# Patient Record
Sex: FEMALE | Race: WHITE | Employment: FULL TIME | URBAN - METROPOLITAN AREA
[De-identification: names, ages, dates, MRNs, and addresses within clinical notes are randomized per-mention and may not be internally consistent; named-entity substitution may affect disease eponyms.]

---

## 2018-06-05 ENCOUNTER — MEDICAL CORRESPONDENCE (OUTPATIENT)
Dept: HEALTH INFORMATION MANAGEMENT | Facility: CLINIC | Age: 23
End: 2018-06-05

## 2018-07-12 ENCOUNTER — OFFICE VISIT (OUTPATIENT)
Dept: PSYCHIATRY | Facility: CLINIC | Age: 23
End: 2018-07-12
Attending: CLINICAL NURSE SPECIALIST
Payer: COMMERCIAL

## 2018-07-12 VITALS — SYSTOLIC BLOOD PRESSURE: 119 MMHG | DIASTOLIC BLOOD PRESSURE: 78 MMHG | WEIGHT: 142 LBS | HEART RATE: 97 BPM

## 2018-07-12 DIAGNOSIS — F41.9 ANXIETY: ICD-10-CM

## 2018-07-12 DIAGNOSIS — F33.1 MAJOR DEPRESSIVE DISORDER, RECURRENT EPISODE, MODERATE (H): ICD-10-CM

## 2018-07-12 DIAGNOSIS — F43.10 PTSD (POST-TRAUMATIC STRESS DISORDER): Primary | ICD-10-CM

## 2018-07-12 DIAGNOSIS — F48.1 DEPERSONALIZATION-DEREALIZATION DISORDER (H): ICD-10-CM

## 2018-07-12 PROBLEM — F64.0 GENDER DYSPHORIA IN ADULT: Status: ACTIVE | Noted: 2018-07-12

## 2018-07-12 PROCEDURE — G0463 HOSPITAL OUTPT CLINIC VISIT: HCPCS | Mod: ZF

## 2018-07-12 RX ORDER — FLUOXETINE 10 MG/1
10 CAPSULE ORAL DAILY
Qty: 30 CAPSULE | Refills: 1 | Status: SHIPPED | OUTPATIENT
Start: 2018-07-12 | End: 2018-11-14

## 2018-07-12 RX ORDER — CLINDAMYCIN PHOSPHATE 10 MG/G
GEL TOPICAL DAILY
COMMUNITY
End: 2020-11-27

## 2018-07-12 RX ORDER — TESTOSTERONE CYPIONATE 200 MG/ML
50 INJECTION, SOLUTION INTRAMUSCULAR WEEKLY
COMMUNITY
End: 2021-04-05

## 2018-07-12 ASSESSMENT — PAIN SCALES - GENERAL: PAINLEVEL: NO PAIN (0)
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
## 2018-07-12 NOTE — MR AVS SNAPSHOT
After Visit Summary   7/12/2018    Syed Hutchinson    MRN: [id number]           Patient Information     Date Of Birth          1995        Visit Information        Provider Department      7/12/2018 10:45 AM Ellie Wise APRN CNS [facility]        Today's Diagnoses     PTSD (post-traumatic stress disorder)    -  1    Depersonalization-derealization disorder        Major depressive disorder, recurrent episode, moderate (H)        Anxiety           Follow-ups after your visit        Follow-up notes from your care team     Return in about 4 weeks (around 8/9/2018).      Your next 10 appointments already scheduled     Aug 10, 2018  4:15 PM CDT   Adult Med Follow UP with ELBA Issa CNS   Psychiatry Clinic ([facility])    [place] [id number]  [address] [place] [id number] [phone]              Who to contact     Please call your clinic at [phone] to:    Ask questions about your health    Make or cancel appointments    Discuss your medicines    Learn about your test results    Speak to your doctor            Additional Information About Your Visit        MyChart Information     [employer] gives you secure access to your electronic health record. If you see a primary care provider, you can also send messages to your care team and make appointments. If you have questions, please call your primary care clinic.  If you do not have a primary care provider, please call [phone] and they will assist you.      [employer] is an electronic gateway that provides easy, online access to your medical records. With [employer], you can request a clinic appointment, read your test results, renew a prescription or communicate with your care team.     To access your existing account, please contact your [facility] or call [phone] for assistance.        Care EveryWhere ID     This is your Care EveryWhere  ID. This could be used by other organizations to access your [place] medical records  [id number]        Your Vitals Were     Pulse                   97            Blood Pressure from Last 3 Encounters:   07/12/18 119/78    Weight from Last 3 Encounters:   07/12/18 64.4 kg (142 lb)              Today, you had the following     No orders found for display         Today's Medication Changes          These changes are accurate as of 7/12/18 11:59 PM.  If you have any questions, ask your nurse or doctor.               Start taking these medicines.        Dose/Directions    FLUoxetine 10 MG capsule   Commonly known as:  PROzac   Used for:  Major depressive disorder, recurrent episode, moderate (H)   Started by:  Ellie Wise APRN CNS        Dose:  10 mg   Take 1 capsule (10 mg) by mouth daily   Quantity:  30 capsule   Refills:  1            Where to get your medicines      These medications were sent to [id number] - [place], [place] - [address] AT [facility] [address]  [facility] [address], [place] [address]    Hours:  24-hours Phone:  [phone]     FLUoxetine 10 MG capsule                Primary Care Provider Fax #    Physician No Ref-Primary [phone]       No address on file        Equal Access to Services     MALI BULLARD AH: Jose Rafael fitzgerald Sosaeid, waaxda luqadaha, qaybta kaalmada adeegyada, arlene skinner. So [facility] [phone].    ATENCIÓN: Si habla español, tiene a olivo disposición servicios gratuitos de asistencia lingüística. William al [phone].    We comply with applicable federal civil rights laws and [place] laws. We do not discriminate on the basis of race, color, national origin, age, disability, sex, sexual orientation, or gender identity.            Thank you!     Thank you for choosing [facility]  for your care. Our goal is always to provide you with excellent care. Hearing back from our patients is one way we can  continue to improve our services. Please take a few minutes to complete the written survey that you may receive in the mail after your visit with us. Thank you!             Your Updated Medication List - Protect others around you: Learn how to safely use, store and throw away your medicines at www.disposemymeds.org.          This list is accurate as of 7/12/18 11:59 PM.  Always use your most recent med list.                   Brand Name Dispense Instructions for use Diagnosis    clindamycin 1 % topical gel    CLINDAMAX     Apply topically daily        FINASTERIDE PO      Take 5 mg by mouth daily        FLUoxetine 10 MG capsule    PROzac    30 capsule    Take 1 capsule (10 mg) by mouth daily    Major depressive disorder, recurrent episode, moderate (H)       testosterone cypionate 200 MG/ML injection    DEPOTESTOTERONE     Inject 50 mg into the muscle once a week

## 2018-07-25 ENCOUNTER — HEALTH MAINTENANCE LETTER (OUTPATIENT)
Age: 23
End: 2018-07-25

## 2018-08-01 ENCOUNTER — MYC MEDICAL ADVICE (OUTPATIENT)
Dept: PSYCHIATRY | Facility: CLINIC | Age: 23
End: 2018-08-01

## 2018-08-01 NOTE — TELEPHONE ENCOUNTER
Phone call to David. Reviewed side effects and consideration of discontinuing and trying SSNRI instead either Venlafaxine or Duloxetine. They will continue fluoxetine 10mg dose at this time and if symptoms worsen stop it and send new message to let me know. Manda CULLEN.  Ellie Wise CNS, APRN

## 2018-08-06 ASSESSMENT — PATIENT HEALTH QUESTIONNAIRE - PHQ9: SUM OF ALL RESPONSES TO PHQ QUESTIONS 1-9: 27

## 2018-08-10 ENCOUNTER — OFFICE VISIT (OUTPATIENT)
Dept: PSYCHIATRY | Facility: CLINIC | Age: 23
End: 2018-08-10
Attending: CLINICAL NURSE SPECIALIST
Payer: COMMERCIAL

## 2018-08-10 VITALS — WEIGHT: 137 LBS | SYSTOLIC BLOOD PRESSURE: 124 MMHG | DIASTOLIC BLOOD PRESSURE: 75 MMHG | HEART RATE: 109 BPM

## 2018-08-10 DIAGNOSIS — F33.1 MAJOR DEPRESSIVE DISORDER, RECURRENT EPISODE, MODERATE (H): ICD-10-CM

## 2018-08-10 DIAGNOSIS — F43.10 PTSD (POST-TRAUMATIC STRESS DISORDER): Primary | ICD-10-CM

## 2018-08-10 DIAGNOSIS — F48.1 DEPERSONALIZATION-DEREALIZATION DISORDER (H): ICD-10-CM

## 2018-08-10 PROCEDURE — G0463 HOSPITAL OUTPT CLINIC VISIT: HCPCS | Mod: ZF

## 2018-08-10 RX ORDER — BUPROPION HYDROCHLORIDE 100 MG/1
TABLET, EXTENDED RELEASE ORAL
Qty: 60 TABLET | Refills: 2 | Status: SHIPPED | OUTPATIENT
Start: 2018-08-10 | End: 2018-10-05

## 2018-08-10 ASSESSMENT — PAIN SCALES - GENERAL: PAINLEVEL: NO PAIN (0)

## 2018-08-10 NOTE — PROGRESS NOTES
"  Outpatient Psychiatry Progress Note     Provider: ELBA Issa CNS  Date: 8/10/2018  Service:  Medication follow up with counseling.   Patient Identification: Syed Hutchinson  : 1995   MRN: [id number]    Syed Hutchinson is a 23 year old year old female who presents for ongoing psychiatric care.  Syed Hutchinson was last seen in clinic on 18.   At that time,   Assessment & Plan      Assessment:   David Hutchinson is a 23 year old transgender male  who presents for treatment of Complex PTSD which exists with symptoms of depersonalization/derealization, anxiety with obsessive compulsive component and depression.  Their mental health treatment history dates back to childhood but only trials of 2 medications. Although has thoughts of being better off dead, denies SI plan or intent.  David is currently in therapy at [facility] and has no history of psychiatric hospitalizations. An outpatient treatment program may be something to consider     Diagnosis  PTSD  Depersonalization/Derealization  Anxiety  Major Depression, Recurrent Moderate     Plan:    Medication: Start Prozac 10mg daily  OTC Recommendations: none  Lab Orders:  none  Referrals: none  Release of Information: release for therapist  Gordon Geiger at [facility] phone [phone] fax [phone] was faxed to our clinic and will be scanned to chart.  Future Treatment Considerations: per response to Prozac  Return for Follow Up: 4 weeks      ____________________________________________________________________________________________________________________________________________  Kenneth Argueta--wanted to check in as I've been experiencing some disruptive side effects and wanted to know how \"normal\" this is and likely to subside. My experience going on zoloft & lexapro wasn't like this at all.     Loss of appetite, nausea, unable to eat normal portions (this is getting really disruptive, I'm forcing myself to eat more than 1000 mahin a " day--this is an ED trigger)   Headaches   Difficulty falling asleep and staying asleep/unable to nap during the day despite exhaustion (in part caused by racing thoughts/nervous energy that results in leg bouncing/hand tapping)   Nightmares     Being unable to get two basic needs (sleep/food) met is impacting my mood and making me feel out of control. My dpdr has gotten worse in the last few weeks to the point that I am taking a day off work because I'm not able to get things done in the office.     If you think this will taper off as I adjust to the med then I'm willing to keep trying for a few more weeks, but if 10mg can mess with me this much, prozac may not be the ssri for me. I know we meet next week, but didn't feel like I could wait any longer.    08/10/2018  Today Syed reports side effects from Prozac worsened and so stopped it on Wednesday.   Sister and father are treated for depression and both have done well with Wellbutrin. Both also have anxiety.  Side effects of medication include: see subjective.  Psychiatric Review of Systems:  Depression: In the last 2 weeks per PHQ 9 score of 27. Denies Suicide plan or intent. Stress and intrusive thoughts affect mood.  Anxiety : no change  Humaira na   Psychosis  na.   ADHD na    Review of Medical Systems:  Sleep: decreased  Energy: low  Concentration: difficult  Appetite: decreased  GI Concerns: none  Cardiac concerns: none  Neurological concerns: none  Other medical concerns: no new concerns  Current Substance Use:  Alcohol:denies  Other drugs:denies  Caffeine:little less than last appointment  Nicotine: denies  Past Medical History: No past medical history on file.  Patient Active Problem List   Diagnosis     PTSD (post-traumatic stress disorder)     Major depressive disorder, recurrent episode, moderate (H)       Allergies:   Allergies   Allergen Reactions     Amoxicillin      hives          Current Medications     Current Outpatient Prescriptions Ordered in  "Epic   Medication Sig Dispense Refill     clindamycin (CLINDAMAX) 1 % topical gel Apply topically daily       FINASTERIDE PO Take 5 mg by mouth daily       testosterone cypionate (DEPOTESTOTERONE) 200 MG/ML injection Inject 50 mg into the muscle once a week       FLUoxetine (PROZAC) 10 MG capsule Take 1 capsule (10 mg) by mouth daily (Patient not taking: Reported on 8/10/2018) 30 capsule 1     No current Epic-ordered facility-administered medications on file.         Mental Status Exam     Appearance:  Casually dressed and Well groomed  Behavior/relationship to examiner/demeanor:  Cooperative  Orientation: Oriented to person, place, time and situation  Psychomotor: normal form  Speech Rate:  Normal  Speech Spontaneity:  Normal  Mood:  \"the same\"  Affect:  Appropriate/mood-congruent  Thought Process (Associations):  Goal directed  Thought Content:  no overt psychosis, patient does not appear to be responding to internal stimuli, Suicidal ideation and denies suicidal intent or plan  Abnormal Perception:  Depersonalization  Attention/Concentration:  Normal  Insight:  Good  Judgment:  Good      Results     Vital signs: /75  Pulse 109  Wt 62.1 kg (137 lb)    Laboratory Data:  denies    Assessment & Plan      Syed Hutchinson is seen today for follow up and reports they did not to tolerate Fluoxetine 10mg secondary to insomnia, decreased appetite, increase in PTSD. Discussed considering SSNRI but they would like to try Wellbutrin since father and sister do well with this medication.   Previous medication trials include Lexapro and Sertraline which caused emotional numbing    Diagnosis  PTSD  Depersonalization/Derealization  Anxiety  Major Depression, Recurrent Moderate    Plan:  Medication: Start Wellbutrin SR 100mg for 2 weeks if no side effects then increase to 200mg daily in the am.  OTC Recommendations: none  Lab Orders:  none  Referrals: none, consider outpatient treatment program.  Release of " Information: none  Future Treatment Considerations:per response to Wellbutrin  Return for Follow Up:10 weeks   The risks, benefits, alternatives and side effects have been discussed and are understood by the patient. The patient understands the risks of using street drugs or alcohol. There are no medical contraindications, the patient agrees to treatment, and has the capacity to do so. The patient understands to call 911 or come to the nearest ED if life threatening or urgent symptoms present.  In addition time was spent counseling the patient and/or coordinating care regarding review of social and occupational functioning.  In addition patient was counseled on health and wellness practices to augment medication treatment of symptoms. See note for details.    Ellie Wise, APRN CNS 8/10/2018

## 2018-08-10 NOTE — MR AVS SNAPSHOT
After Visit Summary   8/10/2018    Syed Hutchinson    MRN: [id number]           Patient Information     Date Of Birth          1995        Visit Information        Provider Department      8/10/2018 4:15 PM Ellie Wise APRN CNS Psychiatry Clinic        Today's Diagnoses     PTSD (post-traumatic stress disorder)    -  1    Major depressive disorder, recurrent episode, moderate (H)        Depersonalization-derealization disorder           Follow-ups after your visit        Your next 10 appointments already scheduled     Oct 19, 2018  4:45 PM CDT   Adult Med Follow UP with ELBA Issa CNS   Psychiatry Clinic ([facility])    [address]  [address] [address] [phone]              Who to contact     Please call your clinic at [phone] to:    Ask questions about your health    Make or cancel appointments    Discuss your medicines    Learn about your test results    Speak to your doctor            Additional Information About Your Visit        [employer] Information     [employer] gives you secure access to your electronic health record. If you see a primary care provider, you can also send messages to your care team and make appointments. If you have questions, please call your primary care clinic.  If you do not have a primary care provider, please call [phone] and they will assist you.      [employer] is an electronic gateway that provides easy, online access to your medical records. With [employer], you can request a clinic appointment, read your test results, renew a prescription or communicate with your care team.     To access your existing account, please contact your [facility] or call [phone] for assistance.        Care EveryWhere ID     This is your Care EveryWhere ID. This could be used by other organizations to access your [facility] medical records  [id number]         Your Vitals Were     Pulse                   109            Blood Pressure from Last 3 Encounters:   08/10/18 124/75   07/12/18 119/78    Weight from Last 3 Encounters:   08/10/18 62.1 kg (137 lb)   07/12/18 64.4 kg (142 lb)              Today, you had the following     No orders found for display         Today's Medication Changes          These changes are accurate as of 8/10/18 11:59 PM.  If you have any questions, ask your nurse or doctor.               Start taking these medicines.        Dose/Directions    buPROPion 100 MG 12 hr tablet   Commonly known as:  WELLBUTRIN SR   Used for:  Major depressive disorder, recurrent episode, moderate (H)   Started by:  Ellie Wise APRN CNS        Take one tablet by mouth daily in the am for two weeks then increase to two tablets daily in the am   Quantity:  60 tablet   Refills:  2            Where to get your medicines      These medications were sent to [id number] - [place], [place] - [address] AT [facility]  [facility] [address], [place] [address]    Hours:  24-hours Phone:  [phone]     buPROPion 100 MG 12 hr tablet                Primary Care Provider Fax #    Physician No Ref-Primary [phone]       No address on file        Equal Access to Services     MALI BULLARD : Hadii ilya scott hadasho Sonelali, waaxda luqadaha, qaybta kaalmada adeegyada, waxay jayde skinner. So [facility] [phone].    ATENCIÓN: Si habla español, tiene a olivo disposición servicios gratuitos de asistencia lingüística. Llame al [phone].    We comply with applicable federal civil rights laws and [place] laws. We do not discriminate on the basis of race, color, national origin, age, disability, sex, sexual orientation, or gender identity.            Thank you!     Thank you for choosing PSYCHIATRY CLINIC  for your care. Our goal is always to provide you with excellent care. Hearing back from our patients is one way  we can continue to improve our services. Please take a few minutes to complete the written survey that you may receive in the mail after your visit with us. Thank you!             Your Updated Medication List - Protect others around you: Learn how to safely use, store and throw away your medicines at www.disposemymeds.org.          This list is accurate as of 8/10/18 11:59 PM.  Always use your most recent med list.                   Brand Name Dispense Instructions for use Diagnosis    buPROPion 100 MG 12 hr tablet    WELLBUTRIN SR    60 tablet    Take one tablet by mouth daily in the am for two weeks then increase to two tablets daily in the am    Major depressive disorder, recurrent episode, moderate (H)       clindamycin 1 % topical gel    CLINDAMAX     Apply topically daily        FINASTERIDE PO      Take 5 mg by mouth daily        FLUoxetine 10 MG capsule    PROzac    30 capsule    Take 1 capsule (10 mg) by mouth daily    Major depressive disorder, recurrent episode, moderate (H)       testosterone cypionate 200 MG/ML injection    DEPOTESTOTERONE     Inject 50 mg into the muscle once a week

## 2018-08-20 PROBLEM — F48.1 DEPERSONALIZATION-DEREALIZATION DISORDER (H): Status: ACTIVE | Noted: 2018-08-20

## 2018-08-22 ASSESSMENT — PATIENT HEALTH QUESTIONNAIRE - PHQ9: SUM OF ALL RESPONSES TO PHQ QUESTIONS 1-9: 27

## 2018-08-24 ENCOUNTER — TELEPHONE (OUTPATIENT)
Dept: PSYCHIATRY | Facility: CLINIC | Age: 23
End: 2018-08-24

## 2018-10-05 ENCOUNTER — OFFICE VISIT (OUTPATIENT)
Dept: PSYCHIATRY | Facility: CLINIC | Age: 23
End: 2018-10-05
Attending: PSYCHIATRY & NEUROLOGY
Payer: COMMERCIAL

## 2018-10-05 VITALS — DIASTOLIC BLOOD PRESSURE: 82 MMHG | SYSTOLIC BLOOD PRESSURE: 118 MMHG | WEIGHT: 144.8 LBS | HEART RATE: 81 BPM

## 2018-10-05 DIAGNOSIS — F48.1 DEPERSONALIZATION-DEREALIZATION DISORDER (H): Primary | ICD-10-CM

## 2018-10-05 PROCEDURE — G0463 HOSPITAL OUTPT CLINIC VISIT: HCPCS | Mod: ZF

## 2018-10-05 RX ORDER — VENLAFAXINE HYDROCHLORIDE 37.5 MG/1
37.5 CAPSULE, EXTENDED RELEASE ORAL DAILY
Qty: 30 CAPSULE | Refills: 0 | Status: SHIPPED | OUTPATIENT
Start: 2018-10-05 | End: 2018-10-25

## 2018-10-05 ASSESSMENT — PAIN SCALES - GENERAL: PAINLEVEL: NO PAIN (0)

## 2018-10-05 NOTE — PATIENT INSTRUCTIONS
Begin Effexor XR 37.5mg daily    In Crisis? :  [facility] [phone] (clinic)    [phone] (after hours)  CRISIS TEXT LINE: Text [id number] from anywhere in [place], anytime, any crisis 24/7;  OR SEE www.crisistextline.org

## 2018-10-05 NOTE — MR AVS SNAPSHOT
After Visit Summary   10/5/2018    Syed Hutchinson    MRN: [id number]           Patient Information     Date Of Birth          1995        Visit Information        Provider Department      10/5/2018 10:00 AM Pavan Baker MD Psychiatry Clinic        Today's Diagnoses     Depersonalization-derealization disorder (H)    -  1      Care Instructions    Begin Effexor XR 37.5mg daily    In Crisis? :  [facility] [phone] (clinic)    [phone] (after hours)  CRISIS TEXT LINE: Text 432831 from anywhere in [place], anytime, any crisis 24/7;  OR SEE www.crisistextline.org          Follow-ups after your visit        Follow-up notes from your care team     Return in about 4 weeks (around 11/2/2018) for 60 MFU.      Your next 10 appointments already scheduled     Oct 30, 2018 10:00 AM CDT   Adult Med Follow UP with Pavan Baker MD   Psychiatry Clinic ([facility])    [place] [id number] [address] [phone]              Who to contact     Please call your clinic at [phone] to:    Ask questions about your health    Make or cancel appointments    Discuss your medicines    Learn about your test results    Speak to your doctor            Additional Information About Your Visit        [employer] Information     [employer] gives you secure access to your electronic health record. If you see a primary care provider, you can also send messages to your care team and make appointments. If you have questions, please call your primary care clinic.  If you do not have a primary care provider, please call [phone] and they will assist you.      [employer] is an electronic gateway that provides easy, online access to your medical records. With [employer], you can request a clinic appointment, read your test results, renew a prescription or communicate with your care team.     To access your existing account, please contact your [facility]  [place] [facility] or call [phone] for assistance.        Care EveryWhere ID     This is your Care EveryWhere ID. This could be used by other organizations to access your [facility] medical records  [id number]        Your Vitals Were     Pulse                   81            Blood Pressure from Last 3 Encounters:   10/05/18 118/82   08/10/18 124/75   07/12/18 119/78    Weight from Last 3 Encounters:   10/05/18 65.7 kg (144 lb 12.8 oz)   08/10/18 62.1 kg (137 lb)   07/12/18 64.4 kg (142 lb)              Today, you had the following     No orders found for display         Today's Medication Changes          These changes are accurate as of 10/5/18 11:59 PM.  If you have any questions, ask your nurse or doctor.               Start taking these medicines.        Dose/Directions    venlafaxine 37.5 MG 24 hr capsule   Commonly known as:  EFFEXOR XR   Used for:  Depersonalization-derealization disorder (H)   Started by:  Pavan Baker MD        Dose:  37.5 mg   Take 1 capsule (37.5 mg) by mouth daily   Quantity:  30 capsule   Refills:  0            Where to get your medicines      These medications were sent to [facility] [address] [place] [address] [address] [place] [address] [email or web address] [facility] [address]     Phone:  [phone]     venlafaxine 37.5 MG 24 hr capsule                Primary Care Provider Fax #    Physician No Ref-Primary [phone]       No address on file        Equal Access to Services     MALI BULLARD : Hadii ilya ku hadasho Soomaali, waaxda luqadaha, qaybta kaalmada adeegyada, arlene skinner. So [facility] [phone].    ATENCIÓN: Si habla español, tiene a olivo disposición servicios gratuitos de asistencia lingüística. Llame al [phone].    We comply with applicable federal civil rights laws and [place] laws. We do not discriminate on the basis of race, color, national origin, age, disability, sex, sexual  orientation, or gender identity.            Thank you!     Thank you for choosing PSYCHIATRY CLINIC  for your care. Our goal is always to provide you with excellent care. Hearing back from our patients is one way we can continue to improve our services. Please take a few minutes to complete the written survey that you may receive in the mail after your visit with us. Thank you!             Your Updated Medication List - Protect others around you: Learn how to safely use, store and throw away your medicines at www.disposemymeds.org.          This list is accurate as of 10/5/18 11:59 PM.  Always use your most recent med list.                   Brand Name Dispense Instructions for use Diagnosis    clindamycin 1 % topical gel    CLINDAMAX     Apply topically daily        FINASTERIDE PO      Take 5 mg by mouth daily        FLUoxetine 10 MG capsule    PROzac    30 capsule    Take 1 capsule (10 mg) by mouth daily    Major depressive disorder, recurrent episode, moderate (H)       testosterone cypionate 200 MG/ML injection    DEPOTESTOSTERONE     Inject 50 mg into the muscle once a week        venlafaxine 37.5 MG 24 hr capsule    EFFEXOR XR    30 capsule    Take 1 capsule (37.5 mg) by mouth daily    Depersonalization-derealization disorder (H)
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
## 2018-10-06 ASSESSMENT — PATIENT HEALTH QUESTIONNAIRE - PHQ9: SUM OF ALL RESPONSES TO PHQ QUESTIONS 1-9: 24

## 2018-10-24 ENCOUNTER — MYC MEDICAL ADVICE (OUTPATIENT)
Dept: PSYCHIATRY | Facility: CLINIC | Age: 23
End: 2018-10-24

## 2018-10-24 DIAGNOSIS — F48.1 DEPERSONALIZATION-DEREALIZATION DISORDER (H): ICD-10-CM

## 2018-10-25 RX ORDER — VENLAFAXINE HYDROCHLORIDE 37.5 MG/1
37.5 CAPSULE, EXTENDED RELEASE ORAL DAILY
Qty: 14 CAPSULE | Refills: 0 | Status: SHIPPED | OUTPATIENT
Start: 2018-10-25 | End: 2018-11-13

## 2018-10-25 NOTE — TELEPHONE ENCOUNTER
Last seen: 10/5/18  RTC: 1 month  Cancel: 10/30/18- rescheduled for 11/13  No-show: none  Next appt: 11/13/18     Incoming refill request from patient.     Medication requested: venlafaxine (EFFEXOR XR) 37.5 MG 24 hr capsule  Directions: Take 1 capsule (37.5 mg) by mouth daily - Oral  Qty: 30 capsule  Last refilled: 10/5/2018     Medication refill approved per refill protocol- 14 day supply sent to pt's preferred pharmacy. Pt will have medications through rescheduled follow-up appt.    Per [employer] communication with pt, 10/30 appt cancelled and rescheduled for 11/13/18. Pt notified via [employer].

## 2018-11-13 ENCOUNTER — OFFICE VISIT (OUTPATIENT)
Dept: PSYCHIATRY | Facility: CLINIC | Age: 23
End: 2018-11-13
Attending: PSYCHIATRY & NEUROLOGY
Payer: COMMERCIAL

## 2018-11-13 VITALS — SYSTOLIC BLOOD PRESSURE: 135 MMHG | WEIGHT: 145.2 LBS | DIASTOLIC BLOOD PRESSURE: 73 MMHG | HEART RATE: 75 BPM

## 2018-11-13 DIAGNOSIS — F48.1 DEPERSONALIZATION-DEREALIZATION DISORDER (H): ICD-10-CM

## 2018-11-13 PROCEDURE — G0463 HOSPITAL OUTPT CLINIC VISIT: HCPCS | Mod: ZF

## 2018-11-13 RX ORDER — VENLAFAXINE HYDROCHLORIDE 37.5 MG/1
75 CAPSULE, EXTENDED RELEASE ORAL DAILY
Qty: 60 CAPSULE | Refills: 1 | Status: SHIPPED | OUTPATIENT
Start: 2018-11-13 | End: 2019-01-13

## 2018-11-13 ASSESSMENT — PAIN SCALES - GENERAL: PAINLEVEL: NO PAIN (0)
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
## 2018-11-13 NOTE — MR AVS SNAPSHOT
After Visit Summary   11/13/2018    Syed Hutchinson    MRN: [id number]           Patient Information     Date Of Birth          1995        Visit Information        Provider Department      11/13/2018 9:00 AM Pavan Baker MD Psychiatry Clinic        Today's Diagnoses     Depersonalization-derealization disorder (H)          Care Instructions    Increase Effexor to 75mg daily    CRISIS NUMBERS:   Provided routinely in [facility] [phone] (clinic)    [phone] (after hours)  CRISIS TEXT LINE: Text 824665 from anywhere in [place], anytime, any crisis 24/7;  OR SEE www.crisistextline.org      Thank you for coming to the PSYCHIATRY CLINIC.    Lab Testing:  If you had lab testing today and your results are reassuring or normal they will be mailed to you or sent through [employer] within 7 days.   If the lab tests need quick action we will call you with the results.  The phone number we will call with results is # [phone] (home) . If this is not the best number please call our clinic and change the number.    Medication Refills:  If you need any refills please call your pharmacy and they will contact us. Our fax number for refills is [phone]. Please allow three business for refill processing.   If you need to  your refill at a new pharmacy, please contact the new pharmacy directly. The new pharmacy will help you get your medications transferred.     Scheduling:  If you have any concerns about today's visit or wish to schedule another appointment please call our office during normal business hours [phone] (8-5:00 M-F)    Contact Us:  Please call [phone] during business hours (8-5:00 M-F).  If after clinic hours, or on the weekend, please call  [phone].    Financial Assistance [phone]  [facility] Billing [phone]  [facility] Billing [phone]  Medical Records [phone]      MENTAL HEALTH CRISIS NUMBERS:  [facility]:   [facility] [place]  [facility] - [phone]   Crisis Residence [facility] - [phone]   Walk-In Counseling Center [facility] - [phone]   COPE 24/7 [facility] Mobile Team for Adults - [[phone]]; Child - [[phone]]     Crisis Connection - [phone]     [facility] - [phone]   Walk-in counseling [facility] - [phone]   Walk-in counseling [facility] - [phone]   Crisis Residence [facility] Residence - [phone]   Urgent Care Adult Mental Health:   --Drop-in, 24/7 crisis line, and [place] Mobile Team [[phone]]    CRISIS TEXT LINE: Text 287-150 from anywhere, anytime, any crisis 24/7;    OR SEE www.crisistextline.org     Poison Control Center - [phone]    CHILD: [facility] needs assessment team - [phone]     [facility] Lifeline - [phone]; or [employer] Lifeline - 1-310.420.5088    If you have a medical emergency please call 911or go to the nearest ER.                    _____________________________________________    Again thank you for choosing PSYCHIATRY CLINIC and please let us know how we can best partner with you to improve you and your family's health.  You may be receiving a survey in the mail regarding this appointment. We would love to have your feedback, both positive and negative, so please fill out the survey and return it using the provided envelope. The survey is done by an external company, so your answers are anonymous.             Follow-ups after your visit        Follow-up notes from your care team     Return in about 5 weeks (around 12/18/2018) for 30 MFU.      Your next 10 appointments already scheduled     Dec 18, 2018 10:00 AM [facility]   Adult Med Follow UP with Pavan Baker MD   Psychiatry Clinic ([facility])    [place] [id number] [address] [id number] [phone]              Who to contact     Please call your clinic at  [phone] to:    Ask questions about your health    Make or cancel appointments    Discuss your medicines    Learn about your test results    Speak to your doctor            Additional Information About Your Visit        [email or web address] Information     [employer] gives you secure access to your electronic health record. If you see a primary care provider, you can also send messages to your care team and make appointments. If you have questions, please call your primary care clinic.  If you do not have a primary care provider, please call [phone] and they will assist you.      [employer] is an electronic gateway that provides easy, online access to your medical records. With [employer], you can request a clinic appointment, read your test results, renew a prescription or communicate with your care team.     To access your existing account, please contact your [facility] or call [phone] for assistance.        Care EveryWhere ID     This is your Care EveryWhere ID. This could be used by other organizations to access your [place] medical records  [id number]        Your Vitals Were     Pulse                   75            Blood Pressure from Last 3 Encounters:   11/13/18 135/73   10/05/18 118/82   08/10/18 124/75    Weight from Last 3 Encounters:   11/13/18 65.9 kg (145 lb 3.2 oz)   10/05/18 65.7 kg (144 lb 12.8 oz)   08/10/18 62.1 kg (137 lb)              Today, you had the following     No orders found for display         Today's Medication Changes          These changes are accurate as of 11/13/18 11:59 PM.  If you have any questions, ask your nurse or doctor.               These medicines have changed or have updated prescriptions.        Dose/Directions    venlafaxine 37.5 MG 24 hr capsule   Commonly known as:  EFFEXOR XR   This may have changed:  how much to take   Used for:  Depersonalization-derealization disorder (H)   Changed by:  Pavan Baker MD        Dose:  75 mg   Take 2  capsules (75 mg) by mouth daily   Quantity:  60 capsule   Refills:  1            Where to get your medicines      These medications were sent to [id number] - [place], [place] - [address], [place] [address]     Phone:  [phone]     venlafaxine 37.5 MG 24 hr capsule                Primary Care Provider Fax #    Physician No Ref-Primary [phone]       No address on file        Equal Access to Services     MALI BULLARD : Hadii ilya ku hadasho Soomaali, waaxda luqadaha, qaybta kaalmada adeegyada, waxay fernyin hayjeremyn starr starr . So [facility] [phone].    ATENCIÓN: Si habla español, tiene a olivo disposición servicios gratuitos de asistencia lingüística. Llame al [phone].    We comply with applicable federal civil rights laws and [place] laws. We do not discriminate on the basis of race, color, national origin, age, disability, sex, sexual orientation, or gender identity.            Thank you!     Thank you for choosing PSYCHIATRY CLINIC  for your care. Our goal is always to provide you with excellent care. Hearing back from our patients is one way we can continue to improve our services. Please take a few minutes to complete the written survey that you may receive in the mail after your visit with us. Thank you!             Your Updated Medication List - Protect others around you: Learn how to safely use, store and throw away your medicines at www.disposemymeds.org.          This list is accurate as of 11/13/18 11:59 PM.  Always use your most recent med list.                   Brand Name Dispense Instructions for use Diagnosis    clindamycin 1 % external gel    CLINDAMAX     Apply topically daily        FINASTERIDE PO      Take 5 mg by mouth daily        testosterone cypionate 200 MG/ML injection    DEPOTESTOSTERONE     Inject 50 mg into the muscle once a week        venlafaxine 37.5 MG 24 hr capsule    EFFEXOR XR     60 capsule    Take 2 capsules (75 mg) by mouth daily    Depersonalization-derealization disorder (H)

## 2018-11-13 NOTE — PATIENT INSTRUCTIONS
Increase Effexor to 75mg daily    CRISIS NUMBERS:   Provided routinely in [facility].   [facility] [phone] (clinic)    [phone] (after hours)  CRISIS TEXT LINE: Text 525177 from anywhere in [place], anytime, any crisis 24/7;  OR SEE www.crisistextline.org      Thank you for coming to the PSYCHIATRY CLINIC.    Lab Testing:  If you had lab testing today and your results are reassuring or normal they will be mailed to you or sent through [employer] within 7 days.   If the lab tests need quick action we will call you with the results.  The phone number we will call with results is # [phone] (home) . If this is not the best number please call our clinic and change the number.    Medication Refills:  If you need any refills please call your pharmacy and they will contact us. Our fax number for refills is [phone]. Please allow three business for refill processing.   If you need to  your refill at a new pharmacy, please contact the new pharmacy directly. The new pharmacy will help you get your medications transferred.     Scheduling:  If you have any concerns about today's visit or wish to schedule another appointment please call our office during normal business hours [phone] (8-5:00 M-F)    Contact Us:  Please call [phone] during business hours (8-5:00 M-F).  If after clinic hours, or on the weekend, please call  [phone].    Financial Assistance [phone]  [employer] [facility] Billing [phone]  [place] Billing [phone]  Medical Records [phone]      MENTAL HEALTH CRISIS NUMBERS:  [facility]:   [facility] - [phone]   Crisis Residence [facility] - [phone]   Walk-In Counseling [facility] - [phone]   COPE 24/7 [place] Mobile Team for Adults - [[phone]]; Child - [[phone]]     Crisis Connection - [phone]     [facility]:   [facility] - [phone]   Walk-in counseling [facility]  [phone]   Walk-in counseling [facility] - [phone]   Crisis Residence [facility] - [phone]   Urgent Care Adult Mental Health:   --Drop-in, 24/7 crisis line, [facility] Mobile Team [[phone]]    CRISIS TEXT LINE: Text 150-766 from anywhere, anytime, any crisis 24/7;    OR SEE www.crisistextline.org     Poison Control Center - [phone]    CHILD: [facility] needs assessment team - [phone]     [facility] Lifeline - 1-369.241.2420; or [employer] Lifeline - 1-656.602.9892    If you have a medical emergency please call 911or go to the nearest ER.                    _____________________________________________    Again thank you for choosing PSYCHIATRY CLINIC and please let us know how we can best partner with you to improve you and your family's health.  You may be receiving a survey in the mail regarding this appointment. We would love to have your feedback, both positive and negative, so please fill out the survey and return it using the provided envelope. The survey is done by an external company, so your answers are anonymous.

## 2018-11-13 NOTE — NURSING NOTE
Chief Complaint   Patient presents with     Recheck Medication     Depersonalization-derealization disorder

## 2018-12-18 ENCOUNTER — OFFICE VISIT (OUTPATIENT)
Dept: PSYCHIATRY | Facility: CLINIC | Age: 23
End: 2018-12-18
Attending: PSYCHIATRY & NEUROLOGY
Payer: COMMERCIAL

## 2018-12-18 VITALS — DIASTOLIC BLOOD PRESSURE: 74 MMHG | HEART RATE: 79 BPM | WEIGHT: 149.6 LBS | SYSTOLIC BLOOD PRESSURE: 110 MMHG

## 2018-12-18 DIAGNOSIS — F48.1 DEPERSONALIZATION-DEREALIZATION DISORDER (H): Primary | ICD-10-CM

## 2018-12-18 PROCEDURE — G0463 HOSPITAL OUTPT CLINIC VISIT: HCPCS | Mod: ZF

## 2018-12-18 ASSESSMENT — PAIN SCALES - GENERAL: PAINLEVEL: NO PAIN (0)

## 2018-12-18 NOTE — PATIENT INSTRUCTIONS
Continue Effexor    Crisis:   [facility] [phone] (clinic)    [phone] (after hours)  CRISIS TEXT LINE: Text [id number] from anywhere in [place], anytime, any crisis 24/7;  OR SEE www.crisistextline.org

## 2019-01-13 ENCOUNTER — MYC REFILL (OUTPATIENT)
Dept: PSYCHIATRY | Facility: CLINIC | Age: 24
End: 2019-01-13

## 2019-01-13 DIAGNOSIS — F48.1 DEPERSONALIZATION-DEREALIZATION DISORDER (H): ICD-10-CM

## 2019-01-14 RX ORDER — VENLAFAXINE HYDROCHLORIDE 37.5 MG/1
75 CAPSULE, EXTENDED RELEASE ORAL DAILY
Qty: 60 CAPSULE | Refills: 0 | Status: SHIPPED | OUTPATIENT
Start: 2019-01-14 | End: 2019-02-11

## 2019-01-14 NOTE — TELEPHONE ENCOUNTER
Last seen: 12/18  RTC: 5 weeks  Cancel: None  No-show: None  Next appt: 1/25    Incoming refill from patient via MyChart     Medication requested: venlafaxine (EFFEXOR XR) 37.5 MG 24 hr capsule  Directions: Take 2 capsules (75 mg) by mouth daily  Qty: 60     Medication refill approved per refill protocol    Writer e-prescribed 30-day supply to [facility] ([phone]). Qty 60, refills 0.

## 2019-01-25 ENCOUNTER — OFFICE VISIT (OUTPATIENT)
Dept: PSYCHIATRY | Facility: CLINIC | Age: 24
End: 2019-01-25
Attending: PSYCHIATRY & NEUROLOGY
Payer: COMMERCIAL

## 2019-01-25 VITALS — SYSTOLIC BLOOD PRESSURE: 136 MMHG | HEART RATE: 116 BPM | WEIGHT: 147.8 LBS | DIASTOLIC BLOOD PRESSURE: 82 MMHG

## 2019-01-25 DIAGNOSIS — F43.10 PTSD (POST-TRAUMATIC STRESS DISORDER): Primary | ICD-10-CM

## 2019-01-25 PROCEDURE — G0463 HOSPITAL OUTPT CLINIC VISIT: HCPCS | Mod: ZF

## 2019-01-25 ASSESSMENT — PAIN SCALES - GENERAL: PAINLEVEL: NO PAIN (0)

## 2019-01-25 NOTE — NURSING NOTE
Chief Complaint   Patient presents with     RECHECK     Depersonalization-derealization disorder
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
## 2019-03-12 ENCOUNTER — OFFICE VISIT (OUTPATIENT)
Dept: PSYCHIATRY | Facility: CLINIC | Age: 24
End: 2019-03-12
Attending: NURSE PRACTITIONER
Payer: COMMERCIAL

## 2019-03-12 VITALS — HEART RATE: 78 BPM | SYSTOLIC BLOOD PRESSURE: 121 MMHG | DIASTOLIC BLOOD PRESSURE: 79 MMHG | WEIGHT: 152.2 LBS

## 2019-03-12 DIAGNOSIS — F43.10 PTSD (POST-TRAUMATIC STRESS DISORDER): ICD-10-CM

## 2019-03-12 DIAGNOSIS — F33.1 MAJOR DEPRESSIVE DISORDER, RECURRENT EPISODE, MODERATE (H): ICD-10-CM

## 2019-03-12 DIAGNOSIS — R68.2 DRY MOUTH: Primary | ICD-10-CM

## 2019-03-12 DIAGNOSIS — F48.1 DEPERSONALIZATION-DEREALIZATION DISORDER (H): ICD-10-CM

## 2019-03-12 PROCEDURE — G0463 HOSPITAL OUTPT CLINIC VISIT: HCPCS | Mod: ZF

## 2019-03-12 RX ORDER — FLUORIDE TOOTHPASTE
10 TOOTHPASTE DENTAL 4 TIMES DAILY PRN
Qty: 473 ML | Refills: 1 | Status: SHIPPED | OUTPATIENT
Start: 2019-03-12 | End: 2020-11-27

## 2019-03-12 RX ORDER — VENLAFAXINE HYDROCHLORIDE 75 MG/1
75 CAPSULE, EXTENDED RELEASE ORAL DAILY
Qty: 30 CAPSULE | Refills: 2 | Status: SHIPPED | OUTPATIENT
Start: 2019-03-12 | End: 2019-06-11

## 2019-03-12 ASSESSMENT — PAIN SCALES - GENERAL: PAINLEVEL: NO PAIN (0)

## 2019-03-12 NOTE — PATIENT INSTRUCTIONS
-Continue to take Effexor XR 75 mg daily (note: it's now 75 mg tablet, not 37.5 tablet, so just take 1 tab)  -Try Biotene mouth wash for dry mouth    Thank you for coming to the PSYCHIATRY CLINIC.    Lab Testing:  If you had lab testing today and your results are reassuring or normal they will be mailed to you or sent through [employer] within 7 days.   If the lab tests need quick action we will call you with the results.  The phone number we will call with results is # [phone] (home) . If this is not the best number please call our clinic and change the number.    Medication Refills:  If you need any refills please call your pharmacy and they will contact us. Our fax number for refills is [phone]. Please allow three business for refill processing.   If you need to  your refill at a new pharmacy, please contact the new pharmacy directly. The new pharmacy will help you get your medications transferred.     Scheduling:  If you have any concerns about today's visit or wish to schedule another appointment please call our office during normal business hours [phone] (8-5:00 M-F)    Contact Us:  Please call [phone] during business hours (8-5:00 M-F).  If after clinic hours, or on the weekend, please call  [phone].    Financial Assistance [phone]  [facility] [employer] [facility] Billing [phone]  [facility] Billing [phone]  Medical Records [phone]      MENTAL HEALTH CRISIS NUMBERS:  [facility]:   [facility] - [phone]   Crisis Residence [facility] - [phone]   Walk-In Counseling [facility] - [phone]   COPE 24/7 [place] Mobile Team for Adults - [[phone]]; Child - [[phone]]     Crisis [facility] - [phone]     [facility]:   [facility] - [phone]   Walk-in counseling [facility] - [phone]   Walk-in counseling [facility] - [phone]   Crisis Residence [facility]  [facility] [phone]   Urgent Care Adult Mental Health:   --Drop-in, 24/7 crisis line, and [facility] Mobile Team [[phone]]    CRISIS TEXT LINE: Text [phone] from anywhere, anytime, any crisis 24/7;    OR SEE www.[email or web address]     Poison Control Center - [phone]    CHILD: [facility] needs assessment team - [phone]     [facility] - [phone]; or [facility] - [phone]    If you have a medical emergency please call 911or go to the nearest ER.                    _____________________________________________    Again thank you for choosing PSYCHIATRY CLINIC and please let us know how we can best partner with you to improve you and your family's health.  You may be receiving a survey in the mail regarding this appointment. We would love to have your feedback, both positive and negative, so please fill out the survey and return it using the provided envelope. The survey is done by an external company, so your answers are anonymous.
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
## 2019-03-12 NOTE — NURSING NOTE
Chief Complaint   Patient presents with     Recheck Medication     PTSD (post-traumatic stress disorder

## 2019-03-15 ASSESSMENT — PATIENT HEALTH QUESTIONNAIRE - PHQ9: SUM OF ALL RESPONSES TO PHQ QUESTIONS 1-9: 22

## 2019-05-14 ENCOUNTER — OFFICE VISIT (OUTPATIENT)
Dept: PSYCHIATRY | Facility: CLINIC | Age: 24
End: 2019-05-14
Attending: NURSE PRACTITIONER
Payer: COMMERCIAL

## 2019-05-14 VITALS — HEART RATE: 87 BPM | SYSTOLIC BLOOD PRESSURE: 125 MMHG | DIASTOLIC BLOOD PRESSURE: 79 MMHG

## 2019-05-14 DIAGNOSIS — F33.1 MAJOR DEPRESSIVE DISORDER, RECURRENT EPISODE, MODERATE (H): ICD-10-CM

## 2019-05-14 DIAGNOSIS — F43.10 PTSD (POST-TRAUMATIC STRESS DISORDER): Primary | ICD-10-CM

## 2019-05-14 DIAGNOSIS — F41.9 ANXIETY: ICD-10-CM

## 2019-05-14 PROCEDURE — G0463 HOSPITAL OUTPT CLINIC VISIT: HCPCS | Mod: ZF

## 2019-05-14 RX ORDER — PRAZOSIN HYDROCHLORIDE 1 MG/1
1 CAPSULE ORAL AT BEDTIME
Qty: 30 CAPSULE | Refills: 1 | Status: SHIPPED | OUTPATIENT
Start: 2019-05-14 | End: 2019-06-11

## 2019-05-14 ASSESSMENT — PAIN SCALES - GENERAL: PAINLEVEL: NO PAIN (0)

## 2019-05-14 NOTE — PATIENT INSTRUCTIONS
-Start Prazosin 1 mg at bedtime for nightmares.  Take it 30 minutes before bedtime.  -Continue Effexor XR 75 mg daily for PTSD    Thank you for coming to the PSYCHIATRY [facility]IC.    Lab Testing:  If you had lab testing today and your results are reassuring or normal they will be mailed to you or sent through [facility] within 7 days.   If the lab tests need quick action we will call you with the results.  The phone number we will call with results is # [phone] (home) . If this is not the best number please call our clinic and change the number.    Medication Refills:  If you need any refills please call your pharmacy and they will contact us. Our fax number for refills is [phone]. Please allow three business for refill processing.   If you need to  your refill at a new pharmacy, please contact the new pharmacy directly. The new pharmacy will help you get your medications transferred.     Scheduling:  If you have any concerns about today's visit or wish to schedule another appointment please call our office during normal business hours [phone] (8-5:00 M-F)    Contact Us:  Please call [phone] during business hours (8-5:00 M-F).  If after clinic hours, or on the weekend, please call  [phone].    Financial Assistance [phone]  [facility] [phone]  [facility] [phone]  Medical Records [phone]      MENTAL HEALTH CRISIS NUMBERS:  [facility]:   [facility] - [phone]   Crisis Residence [facility] - [phone]   Walk-In Counseling [facility] [phone]   [facility]E 24/7 [facility] Mobile Team for Adults - [[phone]]; Child - [[phone]]        [facility]:   [facility] - [phone]   Walk-in counseling [facility] - [phone]   Walk-in counseling [facility] - [phone]   Crisis Residence [facility] - [phone]   Urgent Care Adult Mental  Health:   --Drop-in, 24/7 crisis line, and [facility] Mobile Team [[phone]]    CRISIS TEXT LINE: Text [phone] from anywhere, anytime, any crisis 24/7;    OR SEE [email or web address]     Poison Control Center - [phone]    CHILD: [facility] needs assessment team - [phone]     [facility] Lifeline - [phone]; or [facility] Lifeline - [phone]    If you have a medical emergency please call 911or go to the nearest ER.                    _____________________________________________    Again thank you for choosing PSYCH[facility]ATRY CLINIC and please let us know how we can best partner with you to improve you and your family's health.  You may be receiving a survey in the mail regarding this appointment. We would love to have your feedback, both positive and negative, so please fill out the survey and return it using the provided envelope. The survey is done by an external company, so your answers are anonymous.

## 2019-05-14 NOTE — PROGRESS NOTES
Psychiatry Clinic Progress Note                                                                  Patient Name: Syed Hutchinson  YOB: 1995  MRN: [id number]  Date of Service:  5/14/2019  Last Seen:3/12/2019    Syed Hutchinson is a 24 year old adult who uses the name David and pronoun alex.      David Hutchinson is a 24 year old year old adult who presents for ongoing psychiatric care.  David Hutchinson was last seen in clinic on 3/12/2019.     At that time,     Impression      David Hutchinson is a 23 year old adult with PTSD, anxiety, depersonalization/derealization symptoms, MDD, and anorexia nervosa who presents for transfer of care from Dr Baker.  They had a difficult childhood which they believe contributes to many of their mental health symptoms.        Since last seen, there were increased dissociation for couple months in the context of new intimate relationship.  Pt attributes this partly due to trauma memory resurfacing and partly due to gender dysphoria.  Pt wondered about if increase in Effexor XR would help with trauma symptoms, but also concerned about masking symptoms that decreases their own ability to cope with symptoms and also significant dry mouth that has exacerbated since increase in Effexor XR in the past.  Discussed different ways to deal with dry mouth such as Biotene mouthwash and humidifier.  Also discussed that without increasing Effexor XR itself, addition of Buspar may help increase effects of Effexor XR.  At this time, they decided to try Biotene and stay on current dose of Effexor XR while continue to work intensively with therapist.      Pt reported chronic passive thought of death that has exacerbated briefly since last seen due to increase in dissociation in the context of new intimate relationship, however reports this has decreased to baseline level.  Pt discarded tools for cutting.  Denies current SIB and HI.  They have notable risk factors for  "self-harm including severe anxiety, SIB and social stigma of being a transgender person.  However, risk is mitigated by no h/o suicide attempt, no plan or intent, h/o seeking help when needed, symptom improvement, future oriented, no substance use, good social support  , good job situation and frequent therapy.  Based on all available evidence they do not appear to be at imminent risk for self-harm therefore they do not meet criteria for a 72-hr hold/  involuntary hospitalization.  Recommended 4 week close follow up, but due to their schedule, pt preferred to return in 2 months. Additional steps to minimize risk include: SAFETY PLAN completed Pt agrees to call therapist or crisis services if thoughts or intents of suicide develops.        Diagnosis                                                                    Post Traumatic Stress Disorder with anxiety and depersonalization/derealization symptoms  Major Depressive Disorder, recurrent, moderate   Anorexia Nervosa     Assessment & Plan   -Reviewed pt's past medical record and obtained collateral information.     Medication Ordered/Consults/Labs/tests Ordered:      Medication: continue Effexor XR 75 mg daily  OTC Recommendations: biotene mouthwash  Lab Orders:  none  Referrals: none  Release of Information: none  Future Treatment Considerations: see notes  Return for Follow Up: in 2 months    Psych critical item history includes SIB (cutting).    [All pronouns should read as \"they\"]    Interim History                                                                                                        4, 4     Since the last visit, pt reports overall improvement of symptoms.  Dry mouth has improved with use of Biotene mouthwash, no longer waking up with dry mouth in the middle of the night.  Relationship is going OK, partner is holding the space for David.  Less dissociation.  Partner is sperm producing male, pt uses Mirena as birth control as they don't plan to " be pregnant currently. Continued nightmares, waking up with sweats.  Continues to see Gordon x1/week. Nightmares only started consistently after starting Effexor.  Expecting to move alone for the first time in July 1 as roommate of 4-5 years is moving.  Although they are aware that this is occurring, still feels this would be stressful transition.  Reports occasional SI, but not concerned as it feels this is somewhat baseline and improving.  Has not had any restrictive eating, but did not want weight check today.    Denies any symptoms suggestive or hypomania or psychosis.    Current Suicidality/Hx of Suicide Attempts: chronic passive thought of suicide ideation without plan, denies SA hx  CoCominent Medical concerns: Denies    Medication Side Effects: nightmares since Effexor start      Medical Review of Systems     Apart from the symptoms mentioned int he HPI, the 14 point review of systems, including constitutional, HEENT, cardiovascular, respiratory, gastrointestinal, genitourinary, musculoskeletal, integumentary, endocrine, neurological, hematologic and allergic is entirely negative.    Substance Use   Pt has been staying substance free since last seen.     Medical / Surgical History                                                                                                                  Patient Active Problem List   Diagnosis     PTSD (post-traumatic stress disorder)     Major depressive disorder, recurrent episode, moderate (H)     Depersonalization-derealization disorder (H)       No past surgical history on file.     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  Living arrangements: lives with a male roommate x 4 years, feels safe, will be moving in July 1, will be living alone for the first time  Social Support:roommate, partner, therapist  Access to gun: denies    Allergy            "                     Amoxicillin    Current Medications                                                                                                       Current Outpatient Medications   Medication Sig Dispense Refill     artificial saliva (BIOTENE DRY MOUTHWASH) LIQD liquid Swish and spit 10 mLs in mouth 4 times daily as needed for dry mouth 473 mL 1     clindamycin (CLINDAMAX) 1 % topical gel Apply topically daily       FINASTERIDE PO Take 5 mg by mouth daily       testosterone cypionate (DEPOTESTOTERONE) 200 MG/ML injection Inject 50 mg into the muscle once a week       venlafaxine (EFFEXOR XR) 75 MG 24 hr capsule Take 1 capsule (75 mg) by mouth daily 30 capsule 2         Vitals                                                                                                                       3, 3     Vitals:    05/14/19 0759   BP: 125/79   Pulse: 87        Mental Status Exam                                                                                   9, 14 cog        Alertness: alert  and oriented  Appearance:  Casually dressed  Behavior/Demeanor: cooperative, pleasant and calm, with good  eye contact   Speech: regular rate and rhythm  Mood :  \"better\"  Affect: appropriate and slightly subdued; was congruent to mood; was congruent to content  Thought Process (Associations):  Logical and Goal directed  Thought process (Rate):  Normal  Thought content:  no overt psychosis, denies suicidal ideation, intent or thoughts, patient does not appear to be responding to internal stimuli and denies suicidal intent or plan  Perception:  Reports improved depersonalization and derealization;  Denies auditory hallucinations and visual hallucinations  Attention/Concentration:  Normal  Memory:  Immediate recall intact and Short-term memory intact  Language: intact  Fund of Knowledge/Intelligence:  Above average  Abstraction:  Normal  Insight:  Good  Judgment:  Good  Cognition: (6) does  appear grossly intact; formal " cognitive testing was not done    Physical Exam     Motor activity/EPS:  Normal  Gait:  Normal  Psychomotor: normal or unremarkable    Labs and Results      Pertinent findings on review include: Review of records with relevant information reported in the HPI.    [place] PRES[place] MONITORING PROGRAM [] was checked today:  indicates testosterone 2/12/2019 and 3/5/2019.    PHQ9 Today:  18  PHQ-9 SCORE 8/10/2018 10/5/2018 3/12/2019   PHQ-9 Total Score 27 24 22       No lab results found.  No lab results found.    PSYCHOTROPIC DRUG INTERACTIONS:    no.  MANAGEMENT:  Monitoring for adverse effects    Impression     David Hutchinson is a 24 year old adult  who presents for med management follow up.  Pt reports improvement in dissociation and derealization since last seen, thus also overall improvement of their mood and anxiety despite of knowing that they will have increased stress due to move in July 1.  Pt appears to have good support from their partner and therapist.  Pt has chronic passive thought of death without intent or plan and reports improvement of thoughts since last seen.  Omkar current SI, SIB or HI or restrictive eating. Pt has not cut or used any substance for substantial time. They have notable risk factors for self-harm including hx of SIB and social stigma of being a transgender person.  However, risk is mitigated by no h/o suicide attempt, no plan or intent, h/o seeking help when needed, symptom improvement, future oriented, no substance use, good social support, good job situation and frequent therapy.  Based on all available evidence they do not appear to be at imminent risk for self-harm therefore they do not meet criteria for a 72-hr hold/  involuntary hospitalization.     Pt wants to continue on current Effexor dose as they don't want exacerbating dry mouth.  However, they are open to trial of Prazosin for nightmare.  Will start from Prazosin 1 mg HS while monitoring BP.  Pt will continue weekly  therapy with Gordon Geiger.    Diagnosis                                                                    Post Traumatic Stress Disorder with anxiety and depersonalization/derealization symptoms  Major Depressive Disorder, recurrent, moderate   Anorexia Nervosa      Assessment & Plan   -Reviewed pt's past medical record and obtained collateral information.    Medication Ordered/Consults/Labs/tests Ordered:     Medication:   -Start Prazosin 1 mg at bedtime for nightmares.  Take it 30 minutes before bedtime.  -Continue Effexor XR 75 mg daily for PTSD  OTC Recommendations: none  Lab Orders:  none  Referrals: none  Release of Information: none  Future Treatment Considerations: per symptoms, increase Prazosin  Return for Follow Up: in 4 weeks    -Discussed safety plan for suicidal thoughts  -Discussed plan for suicidality  -Discussed available emergency services  -Patient agrees with the treatment plan  -Encouraged to continue outpatient therapy to gain more coping mechanism for stress.    Treatment Risk Statement: Discussed with the patient my impressions, as well as recommended studies. I educated patient on the differential diagnosis and prognosis. I discussed with the patient the risks and benefits of medications versus no interventions, including efficacy, dose, possible side effects and length of treatment and the importance of medication compliance.  The patient understands the risks, benefits, adverse effects and alternatives. Agrees to treatment with the capacity to do so. No medical contraindications to treatment. The patient also understands the risks of using street drugs or alcohol.    CRISIS NUMBERS:   Provided routinely in AVS.        I spent 30 minutes face to face today with the patient during today's office visit.  Over 50% of this time was spent counseling the patient and/or coordinating care regarding management of PTSD, anxiety and depression.  See note for details.    Yolanda Main, PACO,  5/14/2019

## 2019-05-14 NOTE — NURSING NOTE
"Chief Complaint   Patient presents with     Recheck Medication     PTSD (post-traumatic stress disorder)      Per patient's request \" NO WEIGHT IS TO BE TAKEN\", per Yolanda Main. Viktoriya Shaffer LPN    "

## 2019-05-15 ASSESSMENT — PATIENT HEALTH QUESTIONNAIRE - PHQ9: SUM OF ALL RESPONSES TO PHQ QUESTIONS 1-9: 18

## 2019-06-11 ENCOUNTER — OFFICE VISIT (OUTPATIENT)
Dept: PSYCHIATRY | Facility: CLINIC | Age: 24
End: 2019-06-11
Attending: NURSE PRACTITIONER
Payer: COMMERCIAL

## 2019-06-11 VITALS — DIASTOLIC BLOOD PRESSURE: 71 MMHG | SYSTOLIC BLOOD PRESSURE: 108 MMHG | HEART RATE: 94 BPM

## 2019-06-11 DIAGNOSIS — F43.10 PTSD (POST-TRAUMATIC STRESS DISORDER): Primary | ICD-10-CM

## 2019-06-11 DIAGNOSIS — F33.1 MAJOR DEPRESSIVE DISORDER, RECURRENT EPISODE, MODERATE (H): ICD-10-CM

## 2019-06-11 PROCEDURE — G0463 HOSPITAL OUTPT CLINIC VISIT: HCPCS | Mod: ZF

## 2019-06-11 RX ORDER — VENLAFAXINE HYDROCHLORIDE 75 MG/1
75 CAPSULE, EXTENDED RELEASE ORAL DAILY
Qty: 30 CAPSULE | Refills: 2 | Status: SHIPPED | OUTPATIENT
Start: 2019-06-11 | End: 2019-09-02

## 2019-06-11 RX ORDER — PRAZOSIN HYDROCHLORIDE 1 MG/1
1 CAPSULE ORAL AT BEDTIME
Qty: 30 CAPSULE | Refills: 2 | Status: SHIPPED | OUTPATIENT
Start: 2019-06-11 | End: 2019-09-02

## 2019-06-11 ASSESSMENT — PATIENT HEALTH QUESTIONNAIRE - PHQ9: SUM OF ALL RESPONSES TO PHQ QUESTIONS 1-9: 14

## 2019-06-11 ASSESSMENT — PAIN SCALES - GENERAL: PAINLEVEL: NO PAIN (0)

## 2019-06-11 NOTE — PROGRESS NOTES
Psychiatry Clinic Progress Note                                                                  Patient Name: Syed Hutchinson  YOB: 1995  MRN: [id number]  Date of Service:  6/11/2019  Last Seen:5/14/2019    Syed Hutchinson is a 24 year old adult who uses the name David and pronoun alex.       David Hutchinson is a 24 year old year old adult who presents for ongoing psychiatric care.  David Hutchinson was last seen in clinic on 5/14/2019    At that time,     Impression      David Hutchinson is a 24 year old adult  who presents for med management follow up.  Pt reports improvement in dissociation and derealization since last seen, thus also overall improvement of their mood and anxiety despite of knowing that they will have increased stress due to move in July 1.  Pt appears to have good support from their partner and therapist.  Pt has chronic passive thought of death without intent or plan and reports improvement of thoughts since last seen.  Omkar current SI, SIB or HI or restrictive eating. Pt has not cut or used any substance for substantial time. They have notable risk factors for self-harm including hx of SIB and social stigma of being a transgender person.  However, risk is mitigated by no h/o suicide attempt, no plan or intent, h/o seeking help when needed, symptom improvement, future oriented, no substance use, good social support, good job situation and frequent therapy.  Based on all available evidence they do not appear to be at imminent risk for self-harm therefore they do not meet criteria for a 72-hr hold/  involuntary hospitalization.      Pt wants to continue on current Effexor dose as they don't want exacerbating dry mouth.  However, they are open to trial of Prazosin for nightmare.  Will start from Prazosin 1 mg HS while monitoring BP.  Pt will continue weekly therapy with Gordon Geiger.     Diagnosis                                                                "      Post Traumatic Stress Disorder with anxiety and depersonalization/derealization symptoms  Major Depressive Disorder, recurrent, moderate   Anorexia Nervosa        Assessment & Plan   -Reviewed pt's past medical record and obtained collateral information.     Medication Ordered/Consults/Labs/tests Ordered:      Medication:   -Start Prazosin 1 mg at bedtime for nightmares.  Take it 30 minutes before bedtime.  -Continue Effexor XR 75 mg daily for PTSD  OTC Recommendations: none  Lab Orders:  none  Referrals: none  Release of Information: none  Future Treatment Considerations: per symptoms, increase Prazosin  Return for Follow Up: in 4 weeks      Psych critical item history includes SIB (cutting).     [All pronouns should read as \"they\"]      Interim History                                                                                                        4, 4     Since the last visit, pt reports nightmares have improved, no longer having nightmares every night, it's occasional and when they have nightmares, intensity is less.  Also reports less hypervigilant in general.  Continues to have less depersonalization and derealization. Though they have not started packing as they do not have much belonging, a house mate left last week.  They report feeling somewhat restless and board.  Discussed this with a therapist to create more socializing path to help.  Pt continues to report occasional fleeting thought of death, but this is baseline and is not concerned.  Omkar SI, SIB or HI.  Reports feels stable, but concerned that something bad would occur, but able to hold stability for now.      Denies any symptoms suggestive or hypomania or psychosis.    Current Suicidality/Hx of Suicide Attempts: chronic passive thought of suicide ideation without plan, denies SA hx  CoCominent Medical concerns: Denies    Medication Side Effects: The patient denies all medication side effects.      Medical Review of Systems     Apart " from the symptoms mentioned int he HPI, the 14 point review of systems, including constitutional, HEENT, cardiovascular, respiratory, gastrointestinal, genitourinary, musculoskeletal, integumentary, endocrine, neurological, hematologic and allergic is entirely negative.      Substance Use   Pt has been staying substance free since last seen.     Medical / Surgical History                                                                                                                  Patient Active Problem List   Diagnosis     PTSD (post-traumatic stress disorder)     Major depressive disorder, recurrent episode, moderate (H)     Depersonalization-derealization disorder (H)       No past surgical history on file.     Social/ Family History                                  [per patient report]                                 1ea,1ea     Living arrangements: lives alone, feeling safe, moving in July 1.  Social Support:roommate, partner, therapist  Access to gun: denies      Allergy                                Amoxicillin    Current Medications                                                                                                       Current Outpatient Medications   Medication Sig Dispense Refill     artificial saliva (BIOTENE DRY MOUTHWASH) LIQD liquid Swish and spit 10 mLs in mouth 4 times daily as needed for dry mouth 473 mL 1     clindamycin (CLINDAMAX) 1 % topical gel Apply topically daily       FINASTERIDE PO Take 5 mg by mouth daily       levonorgestrel (MIRENA) 20 MCG/24HR IUD 1 each by Intrauterine route once       prazosin (MINIPRESS) 1 MG capsule Take 1 capsule (1 mg) by mouth At Bedtime 30 capsule 1     testosterone cypionate (DEPOTESTOTERONE) 200 MG/ML injection Inject 50 mg into the muscle once a week       venlafaxine (EFFEXOR XR) 75 MG 24 hr capsule Take 1 capsule (75 mg) by mouth daily 30 capsule 2         Vitals                                                                                "                                        3, 3     Vitals:    06/11/19 0854   BP: 108/71   Pulse: 94        Mental Status Exam                                                                                   9, 14 cog        Alertness: alert  and oriented  Appearance:  Casually dressed  Behavior/Demeanor: cooperative, pleasant and calm, with good  eye contact   Speech: regular rate and rhythm  Mood :  \"okay\"  Affect: appropriate and slightly subdued; was congruent to mood; was congruent to content  Thought Process (Associations):  Goal directed  Thought process (Rate):  Normal  Thought content:  no overt psychosis, denies suicidal ideation, intent or thoughts, patient does not appear to be responding to internal stimuli and denies suicidal intent or plan  Perception:  Reports depersonalization and derealization;  Denies auditory hallucinations and visual hallucinations  Attention/Concentration:  Normal  Memory:  Immediate recall intact and Short-term memory intact  Language: intact  Fund of Knowledge/Intelligence:  Above average  Abstraction:  Normal  Insight:  Good  Judgment:  Good  Cognition: (6) does  appear grossly intact; formal cognitive testing was not done    Physical Exam     Motor activity/EPS:  Normal  Gait:  Normal  Psychomotor: normal or unremarkable    Labs and Results      Pertinent findings on review include: Review of records  with relevant information reported in the HPI.    [place] MONITORING [place]GRAM [] was checked today:  indicates no refill since last seen.    PHQ9 Today:  14  PHQ-9 SCORE 10/5/2018 3/12/2019 5/14/2019   PHQ-9 Total Score 24 22 18       No lab results found.  No lab results found.     PSYCHOTROPIC DRUG INTERACTIONS:    no.  MANAGEMENT:  Monitoring for adverse effects      Impression     David Hutchinson is a 24 year old adult  who presents for med management follow up.  Pt reports improvement of nightmares in its frequency and intensity.  Also reports less " hypervigilance, depersonalization and derealization.  Denies SI, SIB or HI currently though pt has chronic passive thought of death without intent or plan.   Pt has not cut or used any substance for substantial time. They have notable risk factors for self-harm including hx of SIB and social stigma of being a transgender person.  However, risk is mitigated by no h/o suicide attempt, no plan or intent, h/o seeking help when needed, symptom improvement, future oriented, no substance use, good social support, good job situation and frequent therapy.  Based on all available evidence they do not appear to be at imminent risk for self-harm therefore they do not meet criteria for a 72-hr hold/  involuntary hospitalization.     Pt feels current medication regimen works well and does not want to change anything at this time.  Discussed with patient transition from not feeling well chronically to feeling better and even thought this is improvement, changes may be difficult to adjust.  Pt reports they have been discussing this with therapist.  Will continue to monitor symptoms.         Diagnosis                                                                   Post Traumatic Stress Disorder with anxiety and depersonalization/derealization symptoms  Major Depressive Disorder, recurrent, moderate   Anorexia Nervosa       Assessment & Plan   -Reviewed pt's past medical record and obtained collateral information.    Medication Ordered/Consults/Labs/tests Ordered:     Medication: continue current medication regimen  OTC Recommendations: none  Lab Orders:  none  Referrals: none  Release of Information: none  Future Treatment Considerations: per symptoms  Return for Follow Up: in 3 months    -Discussed safety plan for suicidal thoughts  -Discussed plan for suicidality  -Discussed available emergency services  -Patient agrees with the treatment plan  -Encouraged to continue outpatient therapy to gain more coping mechanism for  stress.      Treatment Risk Statement: Discussed with the patient my impressions, as well as recommended studies. I educated patient on the differential diagnosis and prognosis. I discussed with the patient the risks and benefits of medications versus no interventions, including efficacy, dose, possible side effects and length of treatment and the importance of medication compliance.  The patient understands the risks, benefits, adverse effects and alternatives. Agrees to treatment with the capacity to do so. No medical contraindications to treatment. The patient also understands the risks of using street drugs or alcohol.     CRISIS NUMBERS:   Provided routinely in S.       I spent 20 minutes face to face today with the patient during today's office visit.  Over 50% of this time was spent counseling the patient and/or coordinating care regarding management of nightmares, depression and PTSD.  See note for details.    Yolanda Main, PACO,  6/11/2019

## 2019-09-02 DIAGNOSIS — F33.1 MAJOR DEPRESSIVE DISORDER, RECURRENT EPISODE, MODERATE (H): ICD-10-CM

## 2019-09-02 DIAGNOSIS — F43.10 PTSD (POST-TRAUMATIC STRESS DISORDER): ICD-10-CM

## 2019-09-04 RX ORDER — PRAZOSIN HYDROCHLORIDE 1 MG/1
1 CAPSULE ORAL AT BEDTIME
Qty: 30 CAPSULE | Refills: 0 | Status: SHIPPED | OUTPATIENT
Start: 2019-09-04 | End: 2019-09-12

## 2019-09-04 RX ORDER — VENLAFAXINE HYDROCHLORIDE 75 MG/1
75 CAPSULE, EXTENDED RELEASE ORAL DAILY
Qty: 30 CAPSULE | Refills: 0 | Status: SHIPPED | OUTPATIENT
Start: 2019-09-04 | End: 2019-09-12 | Stop reason: DRUGHIGH

## 2019-09-04 NOTE — TELEPHONE ENCOUNTER
Last seen: 6/11  RTC: 3 months  Non-provider cancel: None  No-show: None  Next appt: 9/12    Incoming refill from [facility], Eprescribing    via electronic request     Medication requested:   Disp Refills Start End JACKELYN   prazosin (MINIPRESS) 1 MG capsule 30 capsule 2 6/11/2019  No   Sig - Route: Take 1 capsule (1 mg) by mouth At Bedtime      Disp Refills Start End JACKELYN   venlafaxine (EFFEXOR XR) 75 MG 24 hr capsule 30 capsule 2 6/11/2019  No   Sig - Route: Take 1 capsule (75 mg) by mouth daily     9/12/19 is 93 days past 6/11/19.    Medication refill approved per refill protocol.  Writer e-prescribed 30-day supply to [facility] ([phone]). Qty 30, refills 0.

## 2019-09-12 ENCOUNTER — OFFICE VISIT (OUTPATIENT)
Dept: PSYCHIATRY | Facility: CLINIC | Age: 24
End: 2019-09-12
Attending: NURSE PRACTITIONER
Payer: COMMERCIAL

## 2019-09-12 VITALS — SYSTOLIC BLOOD PRESSURE: 116 MMHG | HEART RATE: 112 BPM | DIASTOLIC BLOOD PRESSURE: 83 MMHG

## 2019-09-12 DIAGNOSIS — F43.10 PTSD (POST-TRAUMATIC STRESS DISORDER): ICD-10-CM

## 2019-09-12 DIAGNOSIS — F33.1 MAJOR DEPRESSIVE DISORDER, RECURRENT EPISODE, MODERATE (H): Primary | ICD-10-CM

## 2019-09-12 PROCEDURE — G0463 HOSPITAL OUTPT CLINIC VISIT: HCPCS | Mod: ZF

## 2019-09-12 RX ORDER — VENLAFAXINE HYDROCHLORIDE 75 MG/1
150 CAPSULE, EXTENDED RELEASE ORAL DAILY
Qty: 60 CAPSULE | Refills: 1 | Status: SHIPPED | OUTPATIENT
Start: 2019-09-12 | End: 2019-10-10

## 2019-09-12 RX ORDER — PRAZOSIN HYDROCHLORIDE 1 MG/1
1 CAPSULE ORAL AT BEDTIME
Qty: 30 CAPSULE | Refills: 2 | Status: SHIPPED | OUTPATIENT
Start: 2019-09-12 | End: 2020-01-20

## 2019-09-12 ASSESSMENT — PAIN SCALES - GENERAL: PAINLEVEL: NO PAIN (0)

## 2019-09-12 NOTE — PROGRESS NOTES
"  Psychiatry Clinic Progress Note                                                                  Patient Name: Syed Hutchinson  YOB: 1995  MRN: [id number]  Date of Service:  9/12/2019  Last Seen:6/11/2019    Syed Hutchinson is a 24 year old adult who uses the name David and pronoun alex.       David Hutchinson is a 24 year old year old adult who presents for ongoing psychiatric care.  David Hutchinson was last seen in clinic on 6/11/2019.    At that time,     Medication Ordered/Consults/Labs/tests Ordered:      Medication: continue current medication regimen  OTC Recommendations: none  Lab Orders:  none  Referrals: none  Release of Information: none  Future Treatment Considerations: per symptoms  Return for Follow Up: in 3 months      Psych critical item history includes SIB (cutting).     [All pronouns should read as \"they\"]    Interim History                                                                                                        4, 4     Since the last visit, reports little more depressed and anxious.  Reports significant changes in life; [place] [facility] [place] frequent at night that may be increasing hypervigilance, feels less safe around neighborhood because of increased number of traffic from previous location, burned out at work as this is busy season, but also got pay increase and have not seen therapist for 3 weeks until they saw the therapist few days ago due to schedule difficulties.  Plans to see therapist weekly and has an appointment.    Reports significantly noticed decreased energy and concentration at work, have \"shut down\" at work.  Sometimes feels like \"barely breathing\" due to feeling frozen.  Denies more nightmares, but increased sense of hypervigilance.  Feels dissociating more at home. Reports sleep has been poor, mostly due to staying up late.  Denies SI, SIB or HI, but feels baseline occasional " fleeting thought of death.  Reports having good social support and not concerned for their safety.  Concerned increase in Effexor may cause more possibility of withdrawal.  Taking medication daily.    Denies any symptoms suggestive of hypomania or psychosis.    Current Suicidality/Hx of Suicide Attempts: chronic passive thought of suicide ideation without plan, denies SA hx  CoCominent Medical concerns: Denies    Medication Side Effects: The patient denies all medication side effects.      Medical Review of Systems     Apart from the symptoms mentioned int he HPI, the 14 point review of systems, including constitutional, HEENT, cardiovascular, respiratory, gastrointestinal, genitourinary, musculoskeletal, integumentary, endocrine, neurological, hematologic and allergic is entirely negative.    Pregnant: None. Breast/Chest feeding: None, Contraception: Mirena    Substance Use   Pt has been staying substance free since last seen.      Medical / Surgical History                                                                                                                  Patient Active Problem List   Diagnosis     PTSD (post-traumatic stress disorder)     Major depressive disorder, recurrent episode, moderate (H)     Depersonalization-derealization disorder (H)       No past surgical history on file.     Social/ Family History                                  [per patient report]                                 1ea,1ea   Living arrangements: lives alone, feeling safe mostly, but less safe feeling than previous location due to noise and traffic volume.  Social Support:roommate, partner, friends, therapist  Access to gun: denies    Allergy                                Amoxicillin    Current Medications                                                                                                       Current Outpatient Medications   Medication Sig Dispense Refill     artificial saliva (BIOTENE DRY MOUTHWASH) LIQD  liquid Swish and spit 10 mLs in mouth 4 times daily as needed for dry mouth 473 mL 1     clindamycin (CLINDAMAX) 1 % topical gel Apply topically daily       FINASTERIDE PO Take 5 mg by mouth daily       levonorgestrel (MIRENA) 20 MCG/24HR IUD 1 each by Intrauterine route once       prazosin (MINIPRESS) 1 MG capsule Take 1 capsule (1 mg) by mouth At Bedtime 30 capsule 0     testosterone cypionate (DEPOTESTOTERONE) 200 MG/ML injection Inject 50 mg into the muscle once a week       venlafaxine (EFFEXOR-XR) 75 MG 24 hr capsule Take 1 capsule (75 mg) by mouth daily 30 capsule 0         Vitals                                                                                                                       3, 3     Vitals:    09/12/19 1025   BP: 116/83   Pulse: 112        Mental Status Exam                                                                                   9, 14 cog        Alertness: alert  and oriented  Appearance:  Casually dressed and Adequately groomed  Behavior/Demeanor: cooperative, pleasant and calm, with good  eye contact   Speech: regular rate and rhythm  Mood :  depressed, overwhelmed and anxious  Affect: appropriate and slightly subdued and anxious; was congruent to mood; was congruent to content  Thought Process (Associations):  Linear and Goal directed  Thought process (Rate):  Normal  Thought content:  no overt psychosis, denies suicidal ideation, intent or thoughts, patient does not appear to be responding to internal stimuli and denies suicidal intent or plan  Perception:  Reports depersonalization;  Denies derealization  Attention/Concentration:  Fair  Memory:  Immediate recall intact and Short-term memory intact  Language: intact  Fund of Knowledge/Intelligence:  Above average  Abstraction:  Normal  Insight:  Good  Judgment:  Good  Cognition: (6) does  appear grossly intact; formal cognitive testing was not done    Physical Exam     Motor activity/EPS:  Normal  Gait:   Normal  Psychomotor: normal or unremarkable    Labs and Results      Pertinent findings on review include: Review of records with relevant information reported in the HPI.    [place] PRES[place] MONITORING PROGRAM [] was checked today:  indicates testosterone 7/22/2019.    PHQ9 Today:  18  PHQ-9 SCORE 3/12/2019 5/14/2019 6/11/2019   PHQ-9 Total Score 22 18 14       No lab results found.  No lab results found.    PSYCHOTROPIC DRUG INTERACTIONS:    no.  MANAGEMENT:  N/A    Impression     Syed Hutchinson is a 24 year old adult  who presents for med management follow up.  Pt appears somewhat depressed and anxious.  Pt reports consistent with impression in increase in depression and anxiety in context of multiple changes in their life. Denies SI, SIB or HI currently though pt has chronic passive thought of death without intent or plan.   Pt has not cut or used any substance for substantial time. They have notable risk factors for self-harm including hx of SIB and social stigma of being a transgender person.  However, risk is mitigated by no h/o suicide attempt, no plan or intent, h/o seeking help when needed, symptom improvement, future oriented, no substance use, good social support, good job situation and frequent therapy.  Based on all available evidence they do not appear to be at imminent risk for self-harm therefore they do not meet criteria for a 72-hr hold/  involuntary hospitalization.   Discussed possibility of PHP or day treatment, but pt does not think it's needed at this time.    Pt also reports having increased symptoms of PTSD.  Pt continues to see therapist weekly after not having seen therapist for 3 weeks.  Discussed possibility of increase in Effexor XR.  Pt initially was concerned increased risk of withdrawal as the dose increased.  Discussed as long as pt is taking the medication daily, withdrawal risk is minimum and when they are stable and want to consider tapering off of medication, will make  gradual plan to prevent withdrawal.  Pt decided to increase Effexor XR to 150 mg daily.  Discussed possibility of increasing Prazosin as it may help for sleep at night, but pt declined it today and wants to do one change at a time.      Diagnosis                                                                   Post Traumatic Stress Disorder with anxiety and depersonalization/derealization symptoms  Major Depressive Disorder, recurrent, moderate   Anorexia Nervosa in remission    Assessment & Plan   -Reviewed pt's past medical record and obtained collateral information.    Medication Ordered/Consults/Labs/tests Ordered:     Medication: Increase Effexor XR to 150 mg daily for depression  OTC Recommendations: none  Lab Orders:  none  Referrals: none  Release of Information: none  Future Treatment Considerations: Per symptoms. Increase Effexor XR further?  Return for Follow Up: in 3-4 weeks    -Discussed safety plan for suicidal thoughts  -Discussed plan for suicidality  -Discussed available emergency services  -Patient agrees with the treatment plan  -Encouraged to continue outpatient therapy to gain more coping mechanism for stress.    Treatment Risk Statement: Discussed with the patient my impressions, as well as recommended studies. I educated patient on the differential diagnosis and prognosis. I discussed with the patient the risks and benefits of medications versus no interventions, including efficacy, dose, possible side effects and length of treatment and the importance of medication compliance.  The patient understands the risks, benefits, adverse effects and alternatives. Agrees to treatment with the capacity to do so. No medical contraindications to treatment. The patient also understands the risks of using street drugs or alcohol.    CRISIS NUMBERS:   Provided routinely in AVS.         I spent 20 minutes face to face today with the patient during today's office visit.  Over 50% of this time was spent  counseling the patient and/or coordinating care regarding management of PTSD, depression and anxiety.  See note for details.    Yolanda Main, PACO,  9/12/2019

## 2019-09-13 ASSESSMENT — PATIENT HEALTH QUESTIONNAIRE - PHQ9: SUM OF ALL RESPONSES TO PHQ QUESTIONS 1-9: 18

## 2019-10-10 ENCOUNTER — OFFICE VISIT (OUTPATIENT)
Dept: PSYCHIATRY | Facility: CLINIC | Age: 24
End: 2019-10-10
Attending: NURSE PRACTITIONER
Payer: COMMERCIAL

## 2019-10-10 VITALS — HEART RATE: 87 BPM | DIASTOLIC BLOOD PRESSURE: 78 MMHG | SYSTOLIC BLOOD PRESSURE: 132 MMHG

## 2019-10-10 DIAGNOSIS — F43.10 PTSD (POST-TRAUMATIC STRESS DISORDER): ICD-10-CM

## 2019-10-10 DIAGNOSIS — F33.1 MAJOR DEPRESSIVE DISORDER, RECURRENT EPISODE, MODERATE (H): Primary | ICD-10-CM

## 2019-10-10 PROCEDURE — G0463 HOSPITAL OUTPT CLINIC VISIT: HCPCS | Mod: ZF

## 2019-10-10 RX ORDER — VENLAFAXINE HYDROCHLORIDE 150 MG/1
150 TABLET, EXTENDED RELEASE ORAL DAILY
Qty: 30 TABLET | Refills: 2 | Status: SHIPPED | OUTPATIENT
Start: 2019-10-10 | End: 2019-10-22

## 2019-10-10 ASSESSMENT — PAIN SCALES - GENERAL: PAINLEVEL: NO PAIN (0)

## 2019-10-10 NOTE — PROGRESS NOTES
"  Psychiatry Clinic Progress Note                                                                  Patient Name: Syed Hutchinson  YOB: 1995  MRN: [id number]  Date of Service:  10/10/2019  Last Seen:9/12/2019    Syed Hutchinson is a 24 year old adult who uses the name David and pronoun they.      David Hutchinson is a 24 year old year old adult who presents for ongoing psychiatric care.  David Hutchinson was last seen in clinic on 9/12/2019.     At that time,     Medication Ordered/Consults/Labs/tests Ordered:      Medication: Increase Effexor XR to 150 mg daily for depression  OTC Recommendations: none  Lab Orders:  none  Referrals: none  Release of Information: none  Future Treatment Considerations: Per symptoms. Increase Effexor XR further?  Return for Follow Up: in 3-4 weeks      Psych critical item history includes SIB (cutting).     [All pronouns should read as \"they\"]    Interim History                                                                                                        4, 4     Since the last visit, pt reports some improvement in depression and anxiety, thus socializing more with others.  Pt continues to feel burned out at work and not eating regularly, but thinks this will improve somewhat as they are taking 1 week [place], looking forward to reset work stress.  Since increase in Effexor XR, noticed some odd dreams though denies this is nightmares, but more emotionally intense dreams.  Pt also reports not due to intense dream, but having some middle awakening that is difficult to return back to sleep, sleeping usually [id number].  Pt concerned on weight gain since they started Effexor.  Denies SI, SIB or HI, but reports occasional fleeting thought of death, reports since this is more existential thoughts, not concerned.        Denies any symptoms suggestive of hypomania or psychosis.    Current Suicidality/Hx of Suicide " Attempts: chronic passive thought of suicide ideation without plan, denies SA hx  CoCominent Medical concerns: Denies      Medication Side Effects: more emotionally intense dream      Medical Review of Systems     Apart from the symptoms mentioned int he HPI, the 14 point review of systems, including constitutional, HEENT, cardiovascular, respiratory, gastrointestinal, genitourinary, musculoskeletal, integumentary, endocrine, neurological, hematologic and allergic is entirely negative.    Pregnant: None. Nursing: None, Contraception: Mirena    Substance Use   Pt has been staying substance free since last seen.      Medical / Surgical History                                                                                                                  Patient Active Problem List   Diagnosis     PTSD (post-traumatic stress disorder)     Major depressive disorder, recurrent episode, moderate (H)     Depersonalization-derealization disorder (H)       No past surgical history on file.     Social/ Family History                                  [per patient report]                                 1ea,1ea   Living arrangements: lives alone, feeling safe mostly, but less safe feeling than previous location due to noise and traffic volume.  Social Support:roommate, partner, friends, therapist  Access to gun: denies    Allergy                                Amoxicillin    Current Medications                                                                                                       Current Outpatient Medications   Medication Sig Dispense Refill     FINASTERIDE PO Take 5 mg by mouth daily       levonorgestrel (MIRENA) 20 MCG/24HR IUD 1 each by Intrauterine route once       prazosin (MINIPRESS) 1 MG capsule Take 1 capsule (1 mg) by mouth At Bedtime 30 capsule 2     testosterone cypionate (DEPOTESTOTERONE) 200 MG/ML injection Inject 50 mg into the muscle once a week       venlafaxine (EFFEXOR XR) 75 MG 24 hr capsule  "Take 2 capsules (150 mg) by mouth daily 60 capsule 1     artificial saliva (BIOTENE DRY MOUTHWASH) LIQD liquid Swish and spit 10 mLs in mouth 4 times daily as needed for dry mouth (Patient not taking: Reported on 10/10/2019) 473 mL 1     clindamycin (CLINDAMAX) 1 % topical gel Apply topically daily           Vitals                                                                                                                       3, 3     Vitals:    10/10/19 1023 10/10/19 1026   BP: 132/78    Pulse: 112 87        Mental Status Exam                                                                                   9, 14 cog        Alertness: alert  and oriented  Appearance:  Casually dressed and Well groomed  Behavior/Demeanor: cooperative, pleasant and calm, with good  eye contact   Speech: regular rate and rhythm  Mood :  \"okay\"  Affect: full range and slightly subdued; was congruent to mood; was congruent to content  Thought Process (Associations):  Linear and Goal directed  Thought process (Rate):  Normal  Thought content:  no overt psychosis, denies suicidal ideation, intent or thoughts, patient does not appear to be responding to internal stimuli and denies suicidal intent or plan  Perception:  Reports none;  Denies depersonalization and derealization  Attention/Concentration:  Normal  Memory:  Immediate recall intact and Short-term memory intact  Language: intact  Fund of Knowledge/Intelligence:  Above average  Abstraction:  Normal  Insight:  Good  Judgment:  Good  Cognition: (6) does  appear grossly intact; formal cognitive testing was not done    Physical Exam     Motor activity/EPS:  Normal  Gait:  Normal  Psychomotor: normal or unremarkable    Labs and Results      Pertinent findings on review include: Review of records with relevant information reported in the HPI.    [place] MONITORING PROGRAM [] was checked today:  indicates no refill since last seen..    PHQ9 Today:  16  PHQ-9 SCORE " 5/14/2019 6/11/2019 9/12/2019   PHQ-9 Total Score 18 14 18     No lab results found.  No lab results found.    PSYCHOTROPIC DRUG INTERACTIONS:    no.  MANAGEMENT:  N/A    Impression/Assessment      David Hutchinson is a 24 year old adult  who presents for med management follow up.  Reviewed pt's past medical record and obtained collateral information.  Pt appears mostly stable in mood and anxiety, reports chronic passive thought of death, but more existential in nature and vehemently denies SI, SIB or HI.  They have notable risk factors for self-harm including hx of SIB and social stigma of being a transgender person.  However, risk is mitigated by no h/o suicide attempt, no plan or intent, h/o seeking help when needed, symptom improvement, future oriented, no substance use, good social support, good job situation and frequent therapy.  Based on all available evidence they do not appear to be at imminent risk for self-harm therefore they do not meet criteria for a 72-hr hold/  involuntary hospitalization.  Pt chose to follow up weekly therapist session with somewhat frequent visit with this writer.     Discussed possible increase in Effexor XR and/or augmentation with Remeron as they are experiencing difficulties with sleep.  However, pt wants to wait and see any medication changes until they return from vacation and see if work stress will improve.  Will continue on current medication regimen at this time      Diagnosis                                                                   PTSD   Major Depressive Disorder, recurrent, moderate   Anorexia Nervosa in remission    Treatment Recommendation & Plan       Medication Ordered/Consults/Labs/tests Ordered:     Medication: continue current medication regimen per pt's request  OTC Recommendations: none  Lab Orders:  none  Referrals: none  Release of Information: none  Future Treatment Considerations: Per symptoms. Increase in Effexor and/or augmentation with  Cecy  Return for Follow Up: in 3-4 weeks    -Discussed safety plan for suicidal thoughts  -Discussed plan for suicidality  -Discussed available emergency services  -Patient agrees with the treatment plan  -Encouraged to continue outpatient therapy to gain more coping mechanism for stress.      Treatment Risk Statement: Discussed with the patient my impressions, as well as recommended studies. I educated patient on the differential diagnosis and prognosis. I discussed with the patient the risks and benefits of medications versus no interventions, including efficacy, dose, possible side effects and length of treatment and the importance of medication compliance.  The patient understands the risks, benefits, adverse effects and alternatives. Agrees to treatment with the capacity to do so. No medical contraindications to treatment. The patient also understands the risks of using street drugs or alcohol.     CRISIS NUMBERS:   Provided routinely in AVS.       I spent 20 minutes face to face today with the patient during today's office visit.  Over 50% of this time was spent counseling the patient and/or coordinating care regarding management of depression.  See note for details.    Yolanda Main, PACO,  10/10/2019

## 2019-10-10 NOTE — PATIENT INSTRUCTIONS
-May consider Remeron for sleep and/or augmentation for depression  -Continue all other medications for now

## 2019-10-11 ASSESSMENT — PATIENT HEALTH QUESTIONNAIRE - PHQ9: SUM OF ALL RESPONSES TO PHQ QUESTIONS 1-9: 16

## 2019-10-21 ENCOUNTER — CARE COORDINATION (OUTPATIENT)
Dept: PSYCHIATRY | Facility: CLINIC | Age: 24
End: 2019-10-21

## 2019-10-21 DIAGNOSIS — F33.1 MAJOR DEPRESSIVE DISORDER, RECURRENT EPISODE, MODERATE (H): Primary | ICD-10-CM

## 2019-10-21 RX ORDER — VENLAFAXINE HYDROCHLORIDE 150 MG/1
150 CAPSULE, EXTENDED RELEASE ORAL DAILY
Qty: 30 CAPSULE | Refills: 2 | Status: SHIPPED | OUTPATIENT
Start: 2019-10-21 | End: 2020-01-20

## 2019-10-21 NOTE — PROGRESS NOTES
Yolanda Main APRN CNP Snyder, David J, RN             Yes please, sorry I didn't look close enough.  Thank you!    Previous Messages      ----- Message -----   From: Paul Vega RN   Sent: 10/21/2019   1:25 PM CDT   To: Paul Vega RN, ELBA Fernández CNP   Subject: Effexor capsules                                 Yolanda:     When you saw this patient recently, you ordered Effexor  mg tablets.   Their insurance plan wants a PA for tablets, but hasn't required one for capsules in the past.   OK to switch to capsules?     Charbel         Writer e-prescribed capsules to pharmacy.

## 2019-10-22 NOTE — PROGRESS NOTES
Writer called pharmacy ([phone]) and Jaci confirmed that the capsule order was successfully billed with a $10 co-pay.

## 2019-11-21 ENCOUNTER — MYC REFILL (OUTPATIENT)
Dept: PSYCHIATRY | Facility: CLINIC | Age: 24
End: 2019-11-21

## 2019-11-21 DIAGNOSIS — F33.1 MAJOR DEPRESSIVE DISORDER, RECURRENT EPISODE, MODERATE (H): ICD-10-CM

## 2019-11-21 RX ORDER — VENLAFAXINE HYDROCHLORIDE 150 MG/1
150 CAPSULE, EXTENDED RELEASE ORAL DAILY
Qty: 30 CAPSULE | Refills: 2 | Status: CANCELLED | OUTPATIENT
Start: 2019-11-21

## 2019-12-05 ENCOUNTER — MYC MEDICAL ADVICE (OUTPATIENT)
Dept: PSYCHIATRY | Facility: CLINIC | Age: 24
End: 2019-12-05

## 2019-12-12 NOTE — TELEPHONE ENCOUNTER
CARDIOVASCULAR CONSULT NOTE    Patient: Garland Klein Date: 12/22/2017   YOB: 1963    54 year old male      CHIEF COMPLAINT:   Chief Complaint   Patient presents with   • Follow-up     6 month follow up / walking 5 miles per day        HISTORY OF PRESENT ILLNESS:  Referred by Chidi Elliott DO    Garland Klein is a 54 year old male with a history of hypertension and thoracic aortic aneurysm without rupture. He presents to the clinic for 6 month follow-up. Patient is feeling well and is compliant with his current medical regimen. Per patient, he decreased his Lisinopril to 20 mg daily, since he was experiencing cramping with 25 mg daily. He complains of bilateral neck pain and states that his sleeping behavior is fair. Depending on how much water he drinks, he uses the bathroom 1-2 times per night. In addition, he does not snore and wakes up fresh. Never smoked tobacco. Denies headache, fever, chills, faintness, dizziness or syncope. Denies nausea, vomiting or change in bowel or bladder. Denies orthopnea or PND. Denies swelling or weakness. Denies chest pain, palpitations or shortness of breath. No other complaints at this time.    Past Medical History:   Diagnosis Date   • Ascending aortic aneurysm (CMS/HCC)    • HTN (hypertension)        No past surgical history on file.    ALLERGIES:  No Known Allergies    Current Medications    ASPIRIN 81 MG TABLET    Take 81 mg by mouth daily. Pt stated he takes 4 tablets daily.    ATENOLOL (TENORMIN) 50 MG TABLET    Take 1 tablet by mouth daily.    LISINOPRIL (ZESTRIL) 20 MG TABLET    Take 1 tablet by mouth daily.    LISINOPRIL (ZESTRIL) 5 MG TABLET    Take 1 tablet by mouth daily. Take with the 20mg tablet for total of 25mg       History   Smoking Status   • Never Smoker   Smokeless Tobacco   • Not on file     History   Alcohol Use   • 6.0 oz/week   • 10 Standard drinks or equivalent per week     Comment: \"a cpl of drink per week\"       Family History  Spoke to David regarding his billing concerns. I directed him to [facility] to help facilitate follow up from [facility] billing.     Problem Relation Age of Onset   • Heart disease Mother      heart attack age 50's   • Stroke Father 71   • Alcohol/Drug Brother        REVIEW OF SYSTEMS:  A 12 point ROS was done and is noncontributory unless otherwise stated in the HPI.    PHYSICAL EXAM:   Constitutional: Well developed, well nourished 54 year old male in no apparent distress.  Skin: Warm, dry and intact.  HEENT: Normocephalic, atraumatic. Mucous membranes moist, EOMs intact. No masses or lesions.  Neck: Supple, trachea midline, negative JVD or HJR at 45 degrees, negative carotid bruits bilaterally.  Cardiovascular: S1, S2 regular rate and rhythm.No murmur. No thrill.  Respiratory:  Anterior/posterior lung sounds clear to auscultation bilaterally. Normal respiratory effort.  Musculoskeletal/Extremities: CRT <3, noclubbing, no cyanosis, no edema.  Psych: Mood and affect appropriate to situation  Neuro: No focal deficits. Sensation equal bilaterally. Normal tone and extraocular movements intact.    INVESTIGATIONS:  Lab Results   Component Value Date    SODIUM 138 04/04/2017    POTASSIUM 3.9 04/04/2017    BUN 16 04/04/2017    CREATININE 0.78 04/04/2017    WBC 5.4 04/04/2017    HCT 42.0 04/04/2017    HGB 14.2 04/04/2017    INR 1.0 04/21/2016    PTT 24 04/03/2016    GLUCOSE 92 04/04/2017    TSH 0.885 04/21/2016    CHOLESTEROL 194 04/21/2016    HDL 43 04/21/2016    CALCLDL 128 04/21/2016    TRIGLYCERIDE 116 04/21/2016     Echo 10/20/2015  Normal left ventricular cavity size. Mildly increased left ventricular wall thickness. Normal left ventricular systolic function. Left  ventricular ejection fraction, 58 %. No regional wall motion abnormalities. Grade II/IV diastolic dysfunction, moderately elevated  filling pressures.  Severely dilated ascending aorta. Mild aortic dilatation of the sinuses of valsalva. (4.7 and 3.9 cm).    CT Angio Chest Abdomen W Wo Contrast 4/4/2016  1. Redemonstrated aneurysmal dilatation of the ascending thoracic aorta, at  approximately 4.5 cm, similar in degree when compared to the prior study.  No evidence of aortic dissection. Incomplete opacification of the thoracic abdominal aorta and branch vessels. No abdominal aneurysm is visualized.  2. Small, nodular liver contour suggestive of cirrhosis.  3. Multiple mildly enlarged mediastinal lymph nodes in varying states of calcification. This most likely relates to granulomatous disease. Continued attention on follow-up.  4. Multiple nodules and tree-in-bud opacities not significant changed from previous study, post infectious or inflammatory. Multiple centrally calcified nodules are also seen, suggestive of granulomatous disease.  Largest discrete nodule is estimated at approximately 8 mm. Continued follow-up as per the Fleischner Society criteria.    CT Angio Chest Abdomen Pelvis W Wo Contrast 4/4/2017  1. No evidence of aortic dissection.  2. Unchanged aneurysmal dilatation of the ascending aorta measuring up to 4.5 cm.  3. Unchanged innumerable small nodules scattered throughout the lung fields and calcified hilar and mediastinal lymphadenopathy, likely from prior granulomatous disease. Superimposed pneumonitis is difficult to exclude.  4. Cirrhotic liver morphology.    ASSESSMENT:   1. Chest pain, unspecified type    2. Benign essential HTN    3. Ascending aortic aneurysm (CMS/HCC)       PLAN:  · Chest Pain: Resolved. Observe. Advised to call or return to clinic as needed if chest pain returns.  · Hypertension: Controlled, advised to continue current medical regimen. He can continue his Lisinopril at 20 mg daily.  · Ascending Aortic Aneurysm: A CT Angio Chest Abdomen Pelvis W Wo Contrast on 4/4/2017 showed no evidence of aortic dissection and unchanged aneurysmal dilatation of the ascending aorta measuring up to 4.5 cm. In addition, a CT Angio Chest Abdomen W Wo Contrast on 4/4/2016 showed aneurysmal dilation at approximately 4.5 cm. Observe and controlling patient's hypertension  with Lisinopril 20 mg daily and Atenolol 50 mg daily.  · He has not taken his flu shot.  · He has not had colonoscopy, advised to follow-up with Dr. Elliott to discuss colonoscopy.  · RTC in 6 months. Call or return to clinic as needed if these symptoms worsen, fail to improve as anticipated, or if new symptoms develop.      On 12/22/17, Christian MONROY scribed the services personally performed by MD PORFIRIO Thornton, Clinton Adkins, attest that I performed all of the work during this encounter and that the scribe only recorded my findings

## 2020-01-10 DIAGNOSIS — F33.1 MAJOR DEPRESSIVE DISORDER, RECURRENT EPISODE, MODERATE (H): ICD-10-CM

## 2020-01-13 RX ORDER — VENLAFAXINE HYDROCHLORIDE 150 MG/1
CAPSULE, EXTENDED RELEASE ORAL
Qty: 30 CAPSULE | Refills: 2 | OUTPATIENT
Start: 2020-01-13

## 2020-03-11 ENCOUNTER — HEALTH MAINTENANCE LETTER (OUTPATIENT)
Age: 25
End: 2020-03-11

## 2020-11-27 ENCOUNTER — OFFICE VISIT (OUTPATIENT)
Dept: PEDIATRICS | Facility: CLINIC | Age: 25
End: 2020-11-27
Payer: COMMERCIAL

## 2020-11-27 VITALS
SYSTOLIC BLOOD PRESSURE: 131 MMHG | TEMPERATURE: 98.6 F | RESPIRATION RATE: 16 BRPM | BODY MASS INDEX: 23.14 KG/M2 | HEART RATE: 97 BPM | HEIGHT: 68 IN | DIASTOLIC BLOOD PRESSURE: 77 MMHG | OXYGEN SATURATION: 96 %

## 2020-11-27 DIAGNOSIS — F33.1 MAJOR DEPRESSIVE DISORDER, RECURRENT EPISODE, MODERATE (H): ICD-10-CM

## 2020-11-27 DIAGNOSIS — Z23 NEED FOR PROPHYLACTIC VACCINATION AND INOCULATION AGAINST INFLUENZA: ICD-10-CM

## 2020-11-27 PROCEDURE — 90686 IIV4 VACC NO PRSV 0.5 ML IM: CPT | Performed by: NURSE PRACTITIONER

## 2020-11-27 PROCEDURE — 90471 IMMUNIZATION ADMIN: CPT | Performed by: NURSE PRACTITIONER

## 2020-11-27 PROCEDURE — 99204 OFFICE O/P NEW MOD 45 MIN: CPT | Mod: 25 | Performed by: NURSE PRACTITIONER

## 2020-11-27 SDOH — HEALTH STABILITY: MENTAL HEALTH: HOW OFTEN DO YOU HAVE A DRINK CONTAINING ALCOHOL?: NEVER

## 2020-11-27 ASSESSMENT — ANXIETY QUESTIONNAIRES
3. WORRYING TOO MUCH ABOUT DIFFERENT THINGS: NOT AT ALL
1. FEELING NERVOUS, ANXIOUS, OR ON EDGE: NOT AT ALL
6. BECOMING EASILY ANNOYED OR IRRITABLE: NOT AT ALL
7. FEELING AFRAID AS IF SOMETHING AWFUL MIGHT HAPPEN: NOT AT ALL
GAD7 TOTAL SCORE: 0
5. BEING SO RESTLESS THAT IT IS HARD TO SIT STILL: NOT AT ALL
2. NOT BEING ABLE TO STOP OR CONTROL WORRYING: NOT AT ALL

## 2020-11-27 ASSESSMENT — PATIENT HEALTH QUESTIONNAIRE - PHQ9
5. POOR APPETITE OR OVEREATING: NOT AT ALL
SUM OF ALL RESPONSES TO PHQ QUESTIONS 1-9: 4

## 2020-11-27 NOTE — LETTER
My Depression Action Plan  Name: Syed Hutchinson   Date of Birth 1995  Date: 11/27/2020    My doctor: No Ref-Primary, Physician   My clinic: [facility] [place] [facility]  [address]  [place] [address] [phone]          GREEN    ZONE   Good Control    What it looks like:     Things are going generally well. You have normal ups and downs. You may even feel depressed from time to time, but bad moods usually last less than a day.   What you need to do:  1. Continue to care for yourself (see self care plan)  2. Check your depression survival kit and update it as needed  3. Follow your physician s recommendations including any medication.  4. Do not stop taking medication unless you consult with your physician first.           YELLOW         ZONE Getting Worse    What it looks like:     Depression is starting to interfere with your life.     It may be hard to get out of bed; you may be starting to isolate yourself from others.    Symptoms of depression are starting to last most all day and this has happened for several days.     You may have suicidal thoughts but they are not constant.   What you need to do:     1. Call your care team. Your response to treatment will improve if you keep your care team informed of your progress. Yellow periods are signs an adjustment may need to be made.     2. Continue your self-care.  Just get dressed and ready for the day.  Don't give yourself time to talk yourself out of it.    3. Talk to someone in your support network.    4. Open up your Depression Self-Care Plan/Wellness Kit.           RED    ZONE Medical Alert - Get Help    What it looks like:     Depression is seriously interfering with your life.     You may experience these or other symptoms: You can t get out of bed most days, can t work or engage in other necessary activities, you have trouble taking care of basic hygiene, or basic responsibilities, thoughts of  suicide or death that will not go away, self-injurious behavior.     What you need to do:  1. Call your care team and request a same-day appointment. If they are not available (weekends or after hours) call your local crisis line, emergency room or 911.            Depression Self-Care Plan / Wellness Kit    Self-Care for Depression  Here s the deal. Your body and mind are really not as separate as most people think.  What you do and think affects how you feel and how you feel influences what you do and think. This means if you do things that people who feel good do, it will help you feel better.  Sometimes this is all it takes.  There is also a place for medication and therapy depending on how severe your depression is, so be sure to consult with your medical provider and/ or Behavioral Health Consultant if your symptoms are worsening or not improving.     In order to better manage my stress, I will:    Exercise  Get some form of exercise, every day. This will help reduce pain and release endorphins, the  feel good  chemicals in your brain. This is almost as good as taking antidepressants!  This is not the same as joining a gym and then never going! (they count on that by the way ) It can be as simple as just going for a walk or doing some gardening, anything that will get you moving.      Hygiene   Maintain good hygiene (get out of bed in the morning, make your bed, brush your teeth, take a shower, and get dressed like you were going to work, even if you are unemployed).  If your clothes don't fit try to get ones that do.    Diet  Strive to eat foods that are good for me, drink plenty of water, and avoid excessive sugar, caffeine, alcohol, and other mood-altering substances.  Some foods that are helpful in depression are: complex carbohydrates, B vitamins, flaxseed, fish or fish oil, fresh fruits and vegetables.    Psychotherapy  Agree to participate in Individual Therapy (if recommended).    Medication  If  prescribed medications, I agree to take them.  Missing doses can result in serious side effects.  I understand that drinking alcohol, or other illicit drug use, may cause potential side effects.  I will not stop my medication abruptly without first discussing it with my provider.    Staying Connected With Others  Stay in touch with my friends, family members, and my primary care provider/team.    Use your imagination  Be creative.  We all have a creative side; it doesn t matter if it s oil painting, sand castles, or mud pies! This will also kick up the endorphins.    Witness Beauty  (AKA stop and smell the roses) Take a look outside, even in mid-winter. Notice colors, textures. Watch the squirrels and birds.     Service to others  Be of service to others.  There is always someone else in need.  By helping others we can  get out of ourselves  and remember the really important things.  This also provides opportunities for practicing all the other parts of the program.    Humor  Laugh and be silly!  Adjust your TV habits for less news and crime-drama and more comedy.    Control your stress  Try breathing deep, massage therapy, biofeedback, and meditation. Find time to relax each day.     Crisis Text Line  http://www.crisistextline.org    The Crisis Text Line serves anyone, in any type of crisis, providing access to free, 24/7 support and information via the medium people already use and trust:    Here's how it works:  1.  Text 052-289 from anywhere in the [place], anytime, about any type of crisis.  2.  A live, trained Crisis Counselor receives the text and responds quickly.  3.  The volunteer Crisis Counselor will help you move from a 'hot moment to a cool moment'.    My support system    Clinic Contact:  Phone number:    Contact 1:  Phone number:    Contact 2:  Phone number:    Advent/:  Phone number:    Therapist:  Phone number:    Local crisis center:    Phone number:    Other community support:   Phone number:

## 2020-11-27 NOTE — PROGRESS NOTES
HPI       Pt is a Female to Male individual that goes by David.  Pt is a 25 year old  that presents today to establish care with me. Came to this clinic via [facility] clinic. Started T march 2015, on T 0.4 ml x 2-3 years. Was going to [facility]. He does sub Q and self injection without problems. He does have a mirena in place, 2 yrs ago. He does have a partner, in a poly relationship and open by partner and his partner. He does have cramping. No new partners since last screening. Gets cramping on occasion.   Injection day: fridays.   History of top surgery in 2016, interested in revision eventually.     Last pap was at [facility] 1-2 yrs ago.     has been seeing a therapist. Mood has been ok, he notes he sees  Therapist regularly. Was seeing psychiatrist, but his insurance doesn't cover his psychiatrist and gets meds through [facility]. Is on effexor and prazosin. He feels his current meds combo is working well, last change was 1-2 yrs ago.     PHQ-9 ([employer]) 10/5/2018 3/12/2019 5/14/2019 6/11/2019 9/12/2019 10/10/2019 11/27/2020   1.  Little interest or pleasure in doing things 3 2 2 1 1 1 0   2.  Feeling down, depressed, or hopeless 3 2 2 2 2 1 0   3.  Trouble falling or staying asleep, or sleeping too much 1 3 2 2 2 3 1   4.  Feeling tired or having little energy 2 3 2 1 2 3 1   5.  Poor appetite or overeating 3 2 2 1 2 2 0   6.  Feeling bad about yourself 3 2 2 2 2 1 0   7.  Trouble concentrating 3 3 2 2 3 3 2   8.  Moving slowly or restless 3 3 2 1 2 1 0   9.  Suicidal or self-harm thoughts 3 2 2 2 2 1 0   PHQ-9 Total Score 24 22 18 14 18 16 4   Difficulty at work, home, or with people Extremely dIfficult (No Data) Somewhat difficult Somewhat difficult Very difficult Very difficult Somewhat difficult     TONY-7 SCORE 11/27/2020   Total Score 0       Goals of hormonal therapy include keep things stable at this point.  Future goals of surgical intervention include revision of top surgery.  Future fertility  plans include: none, does not want to bank eggs    History reviewed. No pertinent surgical history.    Patient Active Problem List   Diagnosis     PTSD (post-traumatic stress disorder)     Major depressive disorder, recurrent episode, moderate (H)     Depersonalization-derealization disorder (H)       Current Outpatient Medications   Medication Sig Dispense Refill     levonorgestrel (MIRENA) 20 MCG/24HR IUD 1 each by Intrauterine route once       prazosin (MINIPRESS) 1 MG capsule Take 1 capsule (1 mg) by mouth At Bedtime 30 capsule 1     testosterone cypionate (DEPOTESTOTERONE) 200 MG/ML injection Inject 50 mg into the muscle once a week       venlafaxine (EFFEXOR-XR) 150 MG 24 hr capsule Take 1 capsule (150 mg) by mouth daily 30 capsule 1       Family History   Problem Relation Age of Onset     No Known Problems Mother      No Known Problems Father           Allergies   Allergen Reactions     Amoxicillin      hives                Review of Systems:     Review of Systems:  CONSTITUTIONAL: NEGATIVE for fever, chills, change in weight  INTEGUMENTARY/SKIN: NEGATIVE for worrisome rashes, moles or lesions  EYES: NEGATIVE for vision changes or irritation  ENT/MOUTH: NEGATIVE for ear, mouth and throat problems  RESP: NEGATIVE for significant cough or SOB  BREAST: NEGATIVE for masses, tenderness or discharge  CV: NEGATIVE for chest pain, palpitations or peripheral edema  GI: NEGATIVE for nausea, abdominal pain, heartburn, or change in bowel habits  : NEGATIVE for frequency, dysuria, or hematuria  MUSCULOSKELETAL: NEGATIVE for significant arthralgias or myalgia  NEURO: NEGATIVE for weakness, dizziness or paresthesias  ENDOCRINE: NEGATIVE for temperature intolerance, skin/hair changes  HEME/ALLERGY: NEGATIVE for bleeding problems  PSYCHIATRIC: NEGATIVE for changes in mood or affect         Physical Exam:     Vitals:    11/27/20 1559   BP: 131/77   Pulse: 97   Resp: 16   Temp: 98.6  F (37  C)   TempSrc: Oral   SpO2: 96%  "  Height: 1.727 m (5' 8\")     BMI= Body mass index is 23.14 kg/m .     GENERAL:: healthy, alert and no distress  PSYCH: Alert and oriented times 3; speech- coherent , normal rate and volume; able to articulate logical thoughts, able to abstract reason, no tangential thoughts, no hallucinations or delusions, affect- normal  Affect: Appropriate/mood-congruent           Labs:     No results found for any previous visit.       No results found for: A1C  No results found for: LDL  No results found for: HGB]    Assessment and Plan      Syed was seen today for establish care, flu shot and imm/inj.    Diagnoses and all orders for this visit:    Endocrine disorder in female-to-male transgender person  Comments:  Hx of top surgery 2016  Orders:  -     **CBC with platelets FUTURE anytime; Future  -     Comprehensive metabolic panel; Future  -     **Testosterone Free and Total FUTURE anytime; Future    Major depressive disorder, recurrent episode, moderate (H)    Need for prophylactic vaccination and inoculation against influenza  -     INFLUENZA VACCINE IM > 6 MONTHS VALENT IIV4 [70816]        Oriented to clinic, my schedule, clinic processes. Encourage use of [facility] for lab results. Oriented to overall hormone start process, may be months before hormones start, expectation to continue in therapy during first months to years of hormone start, need for h8erxti visits then q3mo, then q6mo.    MED CHANGES MADE TODAY: NONE        Medications Discontinued During This Encounter   Medication Reason     FINASTERIDE PO Stopped by Patient     clindamycin (CLINDAMAX) 1 % topical gel Stopped by Patient     artificial saliva (BIOTENE DRY MOUTHWASH) LIQD liquid Stopped by Patient     Questions were elicited and answered.     ELBA Jenkins CNP        "

## 2020-11-27 NOTE — PATIENT INSTRUCTIONS
Send me a mychart request when you're due for refills of effexor and prazosin and T.     Ok to follow up with me in 6 month.     Check your last pap with family tree and last tetanus vaccine.

## 2020-11-28 ASSESSMENT — ANXIETY QUESTIONNAIRES: GAD7 TOTAL SCORE: 0

## 2021-02-04 ENCOUNTER — MYC MEDICAL ADVICE (OUTPATIENT)
Dept: PEDIATRICS | Facility: CLINIC | Age: 26
End: 2021-02-04

## 2021-02-04 DIAGNOSIS — F33.1 MAJOR DEPRESSIVE DISORDER, RECURRENT EPISODE, MODERATE (H): ICD-10-CM

## 2021-02-04 DIAGNOSIS — F43.10 PTSD (POST-TRAUMATIC STRESS DISORDER): ICD-10-CM

## 2021-02-04 RX ORDER — VENLAFAXINE HYDROCHLORIDE 150 MG/1
150 CAPSULE, EXTENDED RELEASE ORAL DAILY
Qty: 30 CAPSULE | Refills: 1 | Status: SHIPPED | OUTPATIENT
Start: 2021-02-04 | End: 2021-04-07

## 2021-02-04 RX ORDER — PRAZOSIN HYDROCHLORIDE 1 MG/1
1 CAPSULE ORAL AT BEDTIME
Qty: 30 CAPSULE | Refills: 1 | Status: SHIPPED | OUTPATIENT
Start: 2021-02-04 | End: 2021-04-07

## 2021-03-15 ENCOUNTER — TELEPHONE (OUTPATIENT)
Dept: SURGERY | Facility: CLINIC | Age: 26
End: 2021-03-15

## 2021-03-15 NOTE — TELEPHONE ENCOUNTER
[facility] Call Center    Phone Message    May a detailed message be left on voicemail: yes     Reason for Call: Other: Patient is calling in looking to schedule an [facility] New Top Surgery. Patient states that they are looking to get a consult for a revision of a previous surgery. Please call back to discuss.     Action Taken: Message routed to:  [facility] Gender Care    Travel Screening: Not Applicable

## 2021-03-16 ENCOUNTER — TELEPHONE (OUTPATIENT)
Dept: PLASTIC SURGERY | Facility: CLINIC | Age: 26
End: 2021-03-16

## 2021-03-16 DIAGNOSIS — F64.0 GENDER DYSPHORIA IN ADOLESCENT AND ADULT: Primary | ICD-10-CM

## 2021-03-16 NOTE — TELEPHONE ENCOUNTER
Called pt regarding interest in setting up a consultation. No answer, LVM with [facility] contact information.     Johanny Ge

## 2021-03-16 NOTE — TELEPHONE ENCOUNTER
[facility] :  Care Coordination Note     SITUATION   Pt (David, they/them) is a 25 year old adult who is receiving support for:  Care Team  .    BACKGROUND     Called pt regarding interest in top surgery revision. Scheduled with Narayan 10/26/21.     ASSESSMENT     Surgery              [facility] Assessment  [facility] ([facility]) Enrollment: (P) Enrolled  Patient has a therapist: (P) No  Letter of support #1: (P) Requested  Surgery being considered: (P) Yes  Mastectomy: (P) Yes    Pt reports:    No smoking  No diabetes  HRT since 3/2015  No current therapist    Previously saw Smith Lucas for surgery in [place]. Pt explains that they have excess tissue on left side under arm/on torso. Pt had a revision consultation with Dr. Zavala, who was unsure of whether it could be removed. The pt would like a 2nd opinion from Dr. Busch.       PLAN          Nursing Interventions:  [facility] assessment completed    Follow-up plan:    1. Writer to send records request to Gayathri Lopez in records    2. Pt to obtain YUNIOR Ge

## 2021-04-05 ENCOUNTER — MYC MEDICAL ADVICE (OUTPATIENT)
Dept: PEDIATRICS | Facility: CLINIC | Age: 26
End: 2021-04-05

## 2021-04-05 DIAGNOSIS — F43.10 PTSD (POST-TRAUMATIC STRESS DISORDER): ICD-10-CM

## 2021-04-05 DIAGNOSIS — F33.1 MAJOR DEPRESSIVE DISORDER, RECURRENT EPISODE, MODERATE (H): ICD-10-CM

## 2021-04-05 RX ORDER — VENLAFAXINE HYDROCHLORIDE 150 MG/1
150 CAPSULE, EXTENDED RELEASE ORAL DAILY
Qty: 30 CAPSULE | Refills: 1 | Status: CANCELLED | OUTPATIENT
Start: 2021-04-05

## 2021-04-07 RX ORDER — TESTOSTERONE CYPIONATE 200 MG/ML
50 INJECTION, SOLUTION INTRAMUSCULAR WEEKLY
Qty: 2 ML | Refills: 0 | Status: SHIPPED | OUTPATIENT
Start: 2021-04-07 | End: 2021-04-30

## 2021-04-07 RX ORDER — PRAZOSIN HYDROCHLORIDE 1 MG/1
1 CAPSULE ORAL AT BEDTIME
Qty: 30 CAPSULE | Refills: 0 | Status: SHIPPED | OUTPATIENT
Start: 2021-04-07 | End: 2021-04-30

## 2021-04-07 NOTE — TELEPHONE ENCOUNTER
Routing to Dr. Romero to review response.     Mariana Conn, RN   [facility] -- Triage Nurse

## 2021-04-07 NOTE — TELEPHONE ENCOUNTER
Routing refill request to provider for review/approval because:  Labs not current:  Multiple labs not current, please review.     Mariana Conn RN   [facility] -- Triage Nurse

## 2021-04-08 ENCOUNTER — TELEPHONE (OUTPATIENT)
Dept: PEDIATRICS | Facility: CLINIC | Age: 26
End: 2021-04-08

## 2021-04-08 NOTE — TELEPHONE ENCOUNTER
Prior Authorization Approval    Authorization Effective Date: 4/8/2021  Authorization Expiration Date: 4/8/2022  Medication: testosterone cypionate (DEPOTESTOSTERONE) 200 MG/ML injection--APPROVED  Approved Dose/Quantity:   Reference #:     Insurance Company: [employer] - Phone [phone] Fax [phone]  Expected CoPay:       CoPay Card Available:      Foundation Assistance Needed:    Which Pharmacy is filling the prescription (Not needed for infusion/clinic administered): [id number] - [place], [place] - [address] [place] [address] AVE AT [address]  Pharmacy Notified: Yes  Patient Notified: Yes **Instructed pharmacy to notify patient when script is ready to /ship.**

## 2021-04-08 NOTE — TELEPHONE ENCOUNTER
Prior Authorization Retail Medication Request    Medication/Dose: Testosterone Cypionate 200mg/ML  ICD code (if different than what is on RX):    Previously Tried and Failed:    Rationale:  Endocrine disorder in female-to-male transgender person [E34.9, F64.0]     Insurance Name:  [employer]  Insurance ID:  [id number]      Pharmacy Information (if different than what is on RX)  Name:    Phone:

## 2021-04-08 NOTE — TELEPHONE ENCOUNTER
PA Initiation    Medication: testosterone cypionate (DEPOTESTOSTERONE) 200 MG/ML injection   Insurance Company: [employer] - Phone [phone] Fax [phone]  Pharmacy Filling the Rx: [id number] [place], [place] - [address] AT [facility] & [address] [place] [address]  Filling Pharmacy Phone: [phone]  Filling Pharmacy Fax: [phone]  Start Date: 4/8/2021

## 2021-04-14 NOTE — TELEPHONE ENCOUNTER
FUTURE VISIT INFORMATION      FUTURE VISIT INFORMATION:    Date: 10.26.21    Time: 11 Am    Location: [facility]  REFERRAL INFORMATION:    Referring provider:  NA    Referring providers clinic:  NA    Reason for visit/diagnosis  new top    RECORDS REQUESTED FROM:       Clinic name Comments Records Status Imaging Status   [facility] Dr. Smith Zavala  Top surgery performed June 2016   In process                                    Pt saw Dr. Smith Zavala at [facility]   June of 2016   [phone]   [facility] [address], [address]   [place] [address]     Action    Action Taken Called [facility] for medical records [phone]. Sent request.

## 2021-04-25 ENCOUNTER — HEALTH MAINTENANCE LETTER (OUTPATIENT)
Age: 26
End: 2021-04-25

## 2021-04-29 ASSESSMENT — PATIENT HEALTH QUESTIONNAIRE - PHQ9
SUM OF ALL RESPONSES TO PHQ QUESTIONS 1-9: 3
10. IF YOU CHECKED OFF ANY PROBLEMS, HOW DIFFICULT HAVE THESE PROBLEMS MADE IT FOR YOU TO DO YOUR WORK, TAKE CARE OF THINGS AT HOME, OR GET ALONG WITH OTHER PEOPLE: SOMEWHAT DIFFICULT
SUM OF ALL RESPONSES TO PHQ QUESTIONS 1-9: 3

## 2021-04-30 ENCOUNTER — VIRTUAL VISIT (OUTPATIENT)
Dept: PEDIATRICS | Facility: CLINIC | Age: 26
End: 2021-04-30
Payer: COMMERCIAL

## 2021-04-30 DIAGNOSIS — M79.644 PAIN OF FINGER OF RIGHT HAND: ICD-10-CM

## 2021-04-30 DIAGNOSIS — Z78.9 FEMALE-TO-MALE TRANSGENDER PERSON: Primary | ICD-10-CM

## 2021-04-30 DIAGNOSIS — Z11.4 SCREENING FOR HIV (HUMAN IMMUNODEFICIENCY VIRUS): ICD-10-CM

## 2021-04-30 DIAGNOSIS — Z11.59 NEED FOR HEPATITIS C SCREENING TEST: ICD-10-CM

## 2021-04-30 DIAGNOSIS — F43.10 PTSD (POST-TRAUMATIC STRESS DISORDER): ICD-10-CM

## 2021-04-30 DIAGNOSIS — F33.1 MAJOR DEPRESSIVE DISORDER, RECURRENT EPISODE, MODERATE (H): ICD-10-CM

## 2021-04-30 PROCEDURE — 99214 OFFICE O/P EST MOD 30 MIN: CPT | Mod: 95 | Performed by: NURSE PRACTITIONER

## 2021-04-30 RX ORDER — VENLAFAXINE HYDROCHLORIDE 150 MG/1
150 CAPSULE, EXTENDED RELEASE ORAL DAILY
Qty: 90 CAPSULE | Refills: 1 | Status: SHIPPED | OUTPATIENT
Start: 2021-04-30 | End: 2021-10-28

## 2021-04-30 RX ORDER — PRAZOSIN HYDROCHLORIDE 1 MG/1
1 CAPSULE ORAL AT BEDTIME
Qty: 90 CAPSULE | Refills: 1 | Status: SHIPPED | OUTPATIENT
Start: 2021-04-30 | End: 2021-10-28

## 2021-04-30 RX ORDER — TESTOSTERONE CYPIONATE 200 MG/ML
40 INJECTION, SOLUTION INTRAMUSCULAR WEEKLY
Qty: 10 ML | Refills: 0 | Status: SHIPPED | OUTPATIENT
Start: 2021-04-30 | End: 2021-10-21

## 2021-04-30 RX ORDER — TESTOSTERONE CYPIONATE 200 MG/ML
INJECTION, SOLUTION INTRAMUSCULAR
COMMUNITY
Start: 2015-03-13 | End: 2021-04-30

## 2021-04-30 ASSESSMENT — PATIENT HEALTH QUESTIONNAIRE - PHQ9: SUM OF ALL RESPONSES TO PHQ QUESTIONS 1-9: 3

## 2021-04-30 NOTE — PROGRESS NOTES
David is a 25 year old who is being evaluated via a billable video visit.      How would you like to obtain your AVS? MyChart  If the video visit is dropped, the invitation should be resent by: Send to e-mail at: [email or web address]  Will anyone else be joining your video visit? No      Video Start Time: 1056    Assessment & Plan     Female-to-male transgender person  Doing well on current regime, keep dose the sandy.   - HIV Antigen Antibody Combo; Future  - testosterone cypionate (DEPOTESTOSTERONE) 200 MG/ML injection; Inject 0.2 mLs (40 mg) into the muscle once a week  - NEISSERIA GONORRHOEA PCR; Future  - CHLAMYDIA TRACHOMATIS PCR; Future    Major depressive disorder, recurrent episode, moderate (H)  Stable, no concerns  - venlafaxine (EFFEXOR-XR) 150 MG 24 hr capsule; Take 1 capsule (150 mg) by mouth daily    Screening for HIV (human immunodeficiency virus)    - HIV Antigen Antibody Combo; Future    Need for hepatitis C screening test    - Hepatitis C Screen Reflex to HCV RNA Quant and Genotype; Future    PTSD (post-traumatic stress disorder)    - prazosin (MINIPRESS) 1 MG capsule; Take 1 capsule (1 mg) by mouth At Bedtime    Pain of finger of right hand  Acute issue, rest, ice and NSAIDs reviewed, follow-up if no improvement for referral to ortho or hand             Return in about 6 months (around 10/30/2021) for Annual Preventative Exam.    Melia Prieto, ELBA CNP  [facility]    Subjective   David is a 25 year old who presents for the following health issues     History of Present Illness       David Hutchinson eats 2-3 servings of fruits and vegetables daily.David Hutchinson consumes 0 sweetened beverage(s) daily.David Hutchinson exercises with enough effort to increase David Hutchinson's heart rate 30 to 60 minutes per day.  David Hutchinson exercises with enough effort to increase David Hutchinson's heart rate 4 days per week.   David Hutchinson is taking  medications regularly.       Follow up on HRT, needs labs ordered.    He is taking 40 mg T weekly, doing well on current regime. No problems with self injections. No side effects or problems. Symptoms stable. Is planning on a consult for top surgery revision. Is interested in hysterectomy eventually, not currently. Does get spotting, but has iud in place, has cramping with spotting.     History of depression, is on effexor and prazosin, feeling things are going ok. Is followed by therapist.     PHQ 10/10/2019 11/27/2020 4/29/2021   PHQ-9 Total Score 16 4 3   Q9: Thoughts of better off dead/self-harm past 2 weeks Several days Not at all Not at all     Dodson had some middle finger swelling of his R hand x 2 wks. Seems worse with braking with his bike, trying taping and ibuprofen with little improvement. Joint isn't red or bruised, but get swollen. Bikes a lot, no other precip factors.     Review of Systems   Constitutional, HEENT, cardiovascular, pulmonary, gi and gu systems are negative, except as otherwise noted.      Objective           Vitals:  No vitals were obtained today due to virtual visit.    Physical Exam   GENERAL: Healthy, alert and no distress  EYES: Eyes grossly normal to inspection.  No discharge or erythema, or obvious scleral/conjunctival abnormalities.  RESP: No audible wheeze, cough, or visible cyanosis.  No visible retractions or increased work of breathing.    SKIN: Visible skin clear. No significant rash, abnormal pigmentation or lesions.  NEURO: Cranial nerves grossly intact.  Mentation and speech appropriate for age.  PSYCH: Mentation appears normal, affect normal/bright, judgement and insight intact, normal speech and appearance well-groomed.              Video-Visit Details    Type of service:  Video Visit    Video End Time:11:11 AM    Originating Location (pt. Location): Home    Distant Location (provider location):  [facility]     Platform used for Video Visit: CARDFREE

## 2021-05-07 DIAGNOSIS — Z11.59 NEED FOR HEPATITIS C SCREENING TEST: ICD-10-CM

## 2021-05-07 DIAGNOSIS — Z78.9 FEMALE-TO-MALE TRANSGENDER PERSON: ICD-10-CM

## 2021-05-07 DIAGNOSIS — Z11.4 SCREENING FOR HIV (HUMAN IMMUNODEFICIENCY VIRUS): ICD-10-CM

## 2021-05-07 LAB
ERYTHROCYTE [DISTWIDTH] IN BLOOD BY AUTOMATED COUNT: 12.6 % (ref 10–15)
HCT VFR BLD AUTO: 48.8 % (ref 35–47)
HGB BLD-MCNC: 16.4 G/DL (ref 11.7–15.7)
MCH RBC QN AUTO: 28.7 PG (ref 26.5–33)
MCHC RBC AUTO-ENTMCNC: 33.6 G/DL (ref 31.5–36.5)
MCV RBC AUTO: 85 FL (ref 78–100)
PLATELET # BLD AUTO: 167 10E9/L (ref 150–450)
RBC # BLD AUTO: 5.72 10E12/L (ref 3.8–5.2)
WBC # BLD AUTO: 4.6 10E9/L (ref 4–11)

## 2021-05-07 PROCEDURE — 36415 COLL VENOUS BLD VENIPUNCTURE: CPT | Performed by: NURSE PRACTITIONER

## 2021-05-07 PROCEDURE — 85027 COMPLETE CBC AUTOMATED: CPT | Performed by: NURSE PRACTITIONER

## 2021-05-07 PROCEDURE — 84270 ASSAY OF SEX HORMONE GLOBUL: CPT | Performed by: NURSE PRACTITIONER

## 2021-05-07 PROCEDURE — 84403 ASSAY OF TOTAL TESTOSTERONE: CPT | Mod: 90 | Performed by: NURSE PRACTITIONER

## 2021-05-07 PROCEDURE — 80053 COMPREHEN METABOLIC PANEL: CPT | Performed by: NURSE PRACTITIONER

## 2021-05-07 PROCEDURE — 87491 CHLMYD TRACH DNA AMP PROBE: CPT | Performed by: NURSE PRACTITIONER

## 2021-05-07 PROCEDURE — 86803 HEPATITIS C AB TEST: CPT | Performed by: NURSE PRACTITIONER

## 2021-05-07 PROCEDURE — 99000 SPECIMEN HANDLING OFFICE-LAB: CPT | Performed by: NURSE PRACTITIONER

## 2021-05-07 PROCEDURE — 87389 HIV-1 AG W/HIV-1&-2 AB AG IA: CPT | Performed by: NURSE PRACTITIONER

## 2021-05-07 PROCEDURE — 87591 N.GONORRHOEAE DNA AMP PROB: CPT | Performed by: NURSE PRACTITIONER

## 2021-05-08 LAB
ALBUMIN SERPL-MCNC: 4.3 G/DL (ref 3.4–5)
ALP SERPL-CCNC: 102 U/L (ref 40–150)
ALT SERPL W P-5'-P-CCNC: 35 U/L (ref 0–50)
ANION GAP SERPL CALCULATED.3IONS-SCNC: 3 MMOL/L (ref 3–14)
AST SERPL W P-5'-P-CCNC: 23 U/L (ref 0–45)
BILIRUB SERPL-MCNC: 0.8 MG/DL (ref 0.2–1.3)
BUN SERPL-MCNC: 14 MG/DL (ref 7–30)
C TRACH DNA SPEC QL NAA+PROBE: NEGATIVE
CALCIUM SERPL-MCNC: 9 MG/DL (ref 8.5–10.1)
CHLORIDE SERPL-SCNC: 106 MMOL/L (ref 94–109)
CO2 SERPL-SCNC: 29 MMOL/L (ref 20–32)
CREAT SERPL-MCNC: 0.86 MG/DL (ref 0.52–1.04)
GFR SERPL CREATININE-BSD FRML MDRD: >90 ML/MIN/{1.73_M2}
GLUCOSE SERPL-MCNC: 95 MG/DL (ref 70–99)
N GONORRHOEA DNA SPEC QL NAA+PROBE: NEGATIVE
POTASSIUM SERPL-SCNC: 4 MMOL/L (ref 3.4–5.3)
PROT SERPL-MCNC: 7.8 G/DL (ref 6.8–8.8)
SODIUM SERPL-SCNC: 138 MMOL/L (ref 133–144)
SPECIMEN SOURCE: NORMAL
SPECIMEN SOURCE: NORMAL

## 2021-05-09 LAB
HCV AB SERPL QL IA: NONREACTIVE
HIV 1+2 AB+HIV1 P24 AG SERPL QL IA: NONREACTIVE

## 2021-05-11 LAB
SHBG SERPL-SCNC: 15 NMOL/L (ref 30–135)
TESTOST FREE SERPL-MCNC: 9.84 NG/DL (ref 0.08–0.74)
TESTOST SERPL-MCNC: 341 NG/DL (ref 8–60)

## 2021-07-29 NOTE — TELEPHONE ENCOUNTER
FUTURE VISIT INFORMATION      FUTURE VISIT INFORMATION:    Date: 10/26/21    Time: 11:00am    Location: [facility]  REFERRAL INFORMATION:    Reason for visit/diagnosis  Top revision    RECORDS REQUESTED FROM:         Clinic name Comments Records Status Imaging Status   [facility] Dr. Smith Zavala  Top surgery performed June 2016- recs receoved and scanned into chart    EPIC        Pt saw Dr. Smith Zavala at [facility]

## 2021-10-10 ENCOUNTER — HEALTH MAINTENANCE LETTER (OUTPATIENT)
Age: 26
End: 2021-10-10

## 2021-10-25 ENCOUNTER — TELEPHONE (OUTPATIENT)
Dept: PLASTIC SURGERY | Facility: CLINIC | Age: 26
End: 2021-10-25

## 2021-10-25 NOTE — TELEPHONE ENCOUNTER
STAR for patient confirming appointment tomorrow at 11:00 in person with .    Call back number was provided.

## 2021-10-26 ENCOUNTER — PRE VISIT (OUTPATIENT)
Dept: SURGERY | Facility: CLINIC | Age: 26
End: 2021-10-26

## 2021-10-26 ENCOUNTER — OFFICE VISIT (OUTPATIENT)
Dept: PLASTIC SURGERY | Facility: CLINIC | Age: 26
End: 2021-10-26
Attending: SURGERY
Payer: COMMERCIAL

## 2021-10-26 VITALS
OXYGEN SATURATION: 95 % | DIASTOLIC BLOOD PRESSURE: 46 MMHG | HEIGHT: 68 IN | TEMPERATURE: 98.5 F | WEIGHT: 207 LBS | BODY MASS INDEX: 31.37 KG/M2 | SYSTOLIC BLOOD PRESSURE: 114 MMHG | HEART RATE: 96 BPM

## 2021-10-26 DIAGNOSIS — F64.0 GENDER DYSPHORIA IN ADOLESCENT AND ADULT: Primary | ICD-10-CM

## 2021-10-26 PROCEDURE — 99203 OFFICE O/P NEW LOW 30 MIN: CPT | Performed by: SURGERY

## 2021-10-26 ASSESSMENT — MIFFLIN-ST. JEOR: SCORE: 1722.45

## 2021-10-26 ASSESSMENT — PAIN SCALES - GENERAL: PAINLEVEL: NO PAIN (0)

## 2021-10-26 NOTE — PROGRESS NOTES
PLASTICS NEW TOP   HPI: This is a 26 year old biological female who identifies as a trans person with a history of gender dysphoria and depression who presents today by themselves for a consultation for a top revision. Their pronouns are they/them and their preferred name is David. They moved here in 2020 from [place] after completing school. They were referred here today by their roommates (who are previous patients of ours). Their therapist is Ricardo Geiger.They are desiring a revision due to having excess tissue fullness on L lateral chest. They originally had their gender reaffirming surgery in 2016 in [place] with Dr Zavala. They since gained 60 lbs post their previous surgery. They believe it was mostly due to testosterone and weight lifting. However, they felt as if their fullness was present prior to their weight gain. Their previous surgeon was reticent to reoperate on the axillary region due to surgical risks around vital structures.     Medical Hx: Gender dysphoria. No history of asthma, diabetes mellitus, or GERD. No bleeding, clotting, healing or scarring problems.  Eating disorder: restrictive diet, under control for the most part.   Scoliosis   Anxiety: well controlled and on medication   Depression: well controlled and on medication   PTSD: well control and on medication     Surgical Hx:   [place] teeth extraction.  Top surgery 2016.  Ganglion cyst procedue pending      Family Hx: No family history of ovarian cancer.  PGM has a history of breast cancer     Social Hx: Occupation: [employer]. 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  Relationship status/family: They are in a polyamorous partnership.  They live with roommates who will help with postop cares. Smoking status: None.  Alcohol use: None. Sober since 2017.  Diet: Omnivore. Caffeine: Drinks 5 tushar cups of coffee every day. Exercise: Weight lifting and cycling.  Sleep: Averages 7  "hours per night. They take Prazosin.      PE: General: Height: 5' 8\" Weight: 207 lbs 0 oz   Chest:   Nice upper chest contour.   Nipple grafts below pec margin, slanted and fairly laterally positioned.   IMFs situated about 2-3 cms below the pec muscle.   Mild lateral thoracic rolls.   Bilateral dog ears  Moderate anterior axillary folds on R.  Severe anterior axillary folds on L.    Fibro-fatty tissue.  No lymphadenopathy or masses.   Photos taken with consent.     A&P: 26 year old biological female who identifies as nonbinary.  Good candidate for gender affirming revision top surgery with re-do incisions to raise to level of pec, removal of NGs, reduction of excess axillary breast tissue.  The patient is NOT interested in nipple re-grafting. The patient will not need a pre-op mammogram, but will need an H&P from their PCP.      They did not meet with our Transgender Coordinator but they will be in contact via [employer] to discuss communications with staff and timeline. They did receive an introductory folder of information and contact numbers/names. Any further discussion of risks and complications will be reviewed during the pre-op visit.    Patient accepts the risks of this procedure and would like to proceed with surgery. They are able to give informed consent for this medically necessary procedure.   Once we receive their therapist letter of support and mammogram results we will initiate the prior authorization process.     According to [place] Case Law and [facility] standards of care, with an appropriate letter of support from a mental health provider, top surgery/mastectomy is medically necessary for the treatment of gender dysphoria.     Total time = 30 minutes, spent on the date of encounter doing chart review, history and physical, dressing changes, documentation, patient education, and any further activity as noted above.     This note was prepared on behalf of Deandra Busch MD by christofer Lagunas " trained medical scribe, based on my observations and the provider's statements to me.

## 2021-10-26 NOTE — NURSING NOTE
"Chief Complaint   Patient presents with     Consult     aDvid, is being seen today for a top revision 3/16/21.       Vitals:    10/26/21 1055   BP: 114/46   BP Location: Left arm   Patient Position: Chair   Cuff Size: Adult Large   Pulse: 96   Temp: 98.5  F (36.9  C)   TempSrc: Oral   SpO2: 95%   Weight: 93.9 kg (207 lb)   Height: 1.727 m (5' 8\")       Body mass index is 31.47 kg/m .      Jodee Schilling LPN    "

## 2021-10-26 NOTE — LETTER
10/26/2021       RE: Syed Hutchinson  [address]  [place] [address]     Dear Colleague,    Thank you for referring your patient, Syed Hutchinson, to the [facility] [place] [facility]. Please see a copy of my visit note below.    PLASTICS NEW TOP   HPI: This is a 26 year old biological female who identifies as a trans person with a history of gender dysphoria and depression who presents today by themselves for a consultation for a top revision. Their pronouns are they/them and their preferred name is David. They moved here in 2020 from [place] after completing school. They were referred here today by their roommates (who are previous patients of ours). Their therapist is Ricardo Geiger.They are desiring a revision due to having excess tissue fullness on L lateral chest. They originally had their gender reaffirming surgery in [place] in [place] with Dr Zavala. They since gained 60 lbs post their previous surgery. They believe it was mostly due to testosterone and weight lifting. However, they felt as if their fullness was present prior to their weight gain. Their previous surgeon was reticent to reoperate on the axillary region due to surgical risks around vital structures.     Medical Hx: Gender dysphoria. No history of asthma, diabetes mellitus, or GERD. No bleeding, clotting, healing or scarring problems.  Eating disorder: restrictive diet, under control for the most part.   Scoliosis   Anxiety: well controlled and on medication   Depression: well controlled and on medication   PTSD: well control and on medication     Surgical Hx:   [place] teeth extraction.  Top surgery 2016.  Ganglion cyst procedue pending      Family Hx: No family history of ovarian cancer.  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  Relationship status/family: They are in a polyamorous partnership.  They live with roommates who will help with postop cares. Smoking status: None.  Alcohol use: None. Sober since 2017.  Diet: Omnivore. Caffeine: Drinks 5 tushar cups of coffee every day. Exercise: Weight lifting and cycling.  Sleep: Averages 7 hours per night. They take Prazosin.      PE: General: Height: 5' 8\" Weight: 207 lbs 0 oz   Chest:   Nice upper chest contour.   Nipple grafts below pec margin, slanted and fairly laterally positioned.   IMFs situated about 2-3 cms below the pec muscle.   Mild lateral thoracic rolls.   Bilateral dog ears  Moderate anterior axillary folds on R.  Severe anterior axillary folds on L.    Fibro-fatty tissue.  No lymphadenopathy or masses.   Photos taken with consent.     A&P: 26 year old biological female who identifies as nonbinary.  Good candidate for gender affirming revision top surgery with re-do incisions to raise to level of pec, removal of NGs, reduction of excess axillary breast tissue.  The patient is NOT interested in nipple re-grafting. The patient will not need a pre-op mammogram, but will need an H&P from their PCP.      They did not meet with our Transgender Coordinator but they will be in contact via [employer] to discuss communications with staff and timeline. They did receive an introductory folder of information and contact numbers/names. Any further discussion of risks and complications will be reviewed during the pre-op visit.    Patient accepts the risks of this procedure and would like to proceed with surgery. They are able to give informed consent for this medically necessary procedure.   Once we receive their therapist letter of support and mammogram results we will initiate the prior authorization process.     According to [place] Case Law and [facility] standards of care, with an appropriate letter of support from a mental health provider, top " surgery/mastectomy is medically necessary for the treatment of gender dysphoria.     Total time = 30 minutes, spent on the date of encounter doing chart review, history and physical, dressing changes, documentation, patient education, and any further activity as noted above.     This note was prepared on behalf of Deandra Busch MD by Naty Salmon, a trained medical scribe, based on my observations and the provider's statements to me.         Again, thank you for allowing me to participate in the care of your patient.      Sincerely,    Deandra Busch MD

## 2021-10-27 ENCOUNTER — MYC MEDICAL ADVICE (OUTPATIENT)
Dept: PEDIATRICS | Facility: CLINIC | Age: 26
End: 2021-10-27

## 2021-10-27 DIAGNOSIS — F33.1 MAJOR DEPRESSIVE DISORDER, RECURRENT EPISODE, MODERATE (H): ICD-10-CM

## 2021-10-27 DIAGNOSIS — F43.10 PTSD (POST-TRAUMATIC STRESS DISORDER): ICD-10-CM

## 2021-10-28 RX ORDER — VENLAFAXINE HYDROCHLORIDE 150 MG/1
150 CAPSULE, EXTENDED RELEASE ORAL DAILY
Qty: 90 CAPSULE | Refills: 0 | Status: SHIPPED | OUTPATIENT
Start: 2021-10-28 | End: 2022-01-20

## 2021-10-28 RX ORDER — PRAZOSIN HYDROCHLORIDE 1 MG/1
1 CAPSULE ORAL AT BEDTIME
Qty: 90 CAPSULE | Refills: 0 | Status: SHIPPED | OUTPATIENT
Start: 2021-10-28 | End: 2022-01-20

## 2021-10-28 NOTE — TELEPHONE ENCOUNTER
Medication is being filled for 1 time refill only due to:  Patient needs labs PHQ-9. Patient needs to be seen because due for follow up visit w/ PCP.  Routing to MA/TC pool. The Pt is due for a visit with PCP. Please call and help them schedule.    RN will issue a 90 day supply. No further refills until the Pt is seen.     Ronal HUTTON RN

## 2021-11-01 ENCOUNTER — MEDICAL CORRESPONDENCE (OUTPATIENT)
Dept: HEALTH INFORMATION MANAGEMENT | Facility: CLINIC | Age: 26
End: 2021-11-01
Payer: COMMERCIAL

## 2021-11-07 DIAGNOSIS — Z78.9 FEMALE-TO-MALE TRANSGENDER PERSON: ICD-10-CM

## 2021-11-09 RX ORDER — TESTOSTERONE CYPIONATE 200 MG/ML
40 INJECTION, SOLUTION INTRAMUSCULAR WEEKLY
Qty: 10 ML | Refills: 0 | OUTPATIENT
Start: 2021-11-09

## 2021-11-09 NOTE — TELEPHONE ENCOUNTER
Prescription refilled on 10/21/21. Refusing refill request and closing encounter.     Bri Boyer RN on 11/9/2021 at 10:04 AM

## 2021-12-07 DIAGNOSIS — F64.0 GENDER DYSPHORIA IN ADOLESCENT AND ADULT: Primary | ICD-10-CM

## 2021-12-07 RX ORDER — CLINDAMYCIN PHOSPHATE 900 MG/50ML
900 INJECTION, SOLUTION INTRAVENOUS SEE ADMIN INSTRUCTIONS
Status: CANCELLED | OUTPATIENT
Start: 2021-12-07

## 2021-12-07 RX ORDER — CLINDAMYCIN PHOSPHATE 900 MG/50ML
900 INJECTION, SOLUTION INTRAVENOUS
Status: CANCELLED | OUTPATIENT
Start: 2021-12-07

## 2021-12-22 ENCOUNTER — OFFICE VISIT (OUTPATIENT)
Dept: FAMILY MEDICINE | Facility: CLINIC | Age: 26
End: 2021-12-22
Payer: COMMERCIAL

## 2021-12-22 VITALS
TEMPERATURE: 98.6 F | SYSTOLIC BLOOD PRESSURE: 134 MMHG | DIASTOLIC BLOOD PRESSURE: 70 MMHG | HEART RATE: 85 BPM | OXYGEN SATURATION: 97 %

## 2021-12-22 DIAGNOSIS — Z01.818 PREOP GENERAL PHYSICAL EXAM: Primary | ICD-10-CM

## 2021-12-22 DIAGNOSIS — F33.1 MAJOR DEPRESSIVE DISORDER, RECURRENT EPISODE, MODERATE (H): ICD-10-CM

## 2021-12-22 DIAGNOSIS — Z78.9 FEMALE-TO-MALE TRANSGENDER PERSON: ICD-10-CM

## 2021-12-22 DIAGNOSIS — M67.431 GANGLION CYST OF WRIST, RIGHT: ICD-10-CM

## 2021-12-22 PROCEDURE — 99214 OFFICE O/P EST MOD 30 MIN: CPT | Mod: 25 | Performed by: STUDENT IN AN ORGANIZED HEALTH CARE EDUCATION/TRAINING PROGRAM

## 2021-12-22 PROCEDURE — 90715 TDAP VACCINE 7 YRS/> IM: CPT | Performed by: STUDENT IN AN ORGANIZED HEALTH CARE EDUCATION/TRAINING PROGRAM

## 2021-12-22 PROCEDURE — 90471 IMMUNIZATION ADMIN: CPT | Performed by: STUDENT IN AN ORGANIZED HEALTH CARE EDUCATION/TRAINING PROGRAM

## 2021-12-22 NOTE — NURSING NOTE
Prior to immunization administration, verified patients identity using patient s name and date of birth. Please see Immunization Activity for additional information.     Screening Questionnaire for Adult Immunization    Are you sick today?   No   Do you have allergies to medications, food, a vaccine component or latex?   No   Have you ever had a serious reaction after receiving a vaccination?   No   Do you have a long-term health problem with heart, lung, kidney, or metabolic disease (e.g., diabetes), asthma, a blood disorder, no spleen, complement component deficiency, a cochlear implant, or a spinal fluid leak?  Are you on long-term aspirin therapy?   No   Do you have cancer, leukemia, HIV/AIDS, or any other immune system problem?   No   Do you have a parent, brother, or sister with an immune system problem?   No   In the past 3 months, have you taken medications that affect  your immune system, such as prednisone, other steroids, or anticancer drugs; drugs for the treatment of rheumatoid arthritis, Crohn s disease, or psoriasis; or have you had radiation treatments?   No   Have you had a seizure, or a brain or other nervous system problem?   No   During the past year, have you received a transfusion of blood or blood    products, or been given immune (gamma) globulin or antiviral drug?   No   For women: Are you pregnant or is there a chance you could become       pregnant during the next month?   No   Have you received any vaccinations in the past 4 weeks?   No     Immunization questionnaire answers were all negative.        Per orders of Dr. Doshi, injection of TDAP given by Rachel Haynes. Patient instructed to remain in clinic for 15 minutes afterwards, and to report any adverse reaction to me immediately.       Screening performed by Rachel Haynes on 12/22/2021 at 4:20 PM.

## 2021-12-22 NOTE — PROGRESS NOTES
[facility] [address] [place] [address]  Phone: [phone]  Primary Provider: Melia Ortiz  Pre-op Performing Provider: DENNY FISHER      PREOPERATIVE EVALUATION:  Today's date: 12/22/2021    Syed Hutchinson is a 26 year old adult who presents for a preoperative evaluation.    Surgical Information:  Surgery/Procedure: ganglionectomy   Surgery Location: [facility]   Surgeon: Donnie Good  Surgery Date: 1-4-22  Time of Surgery: TBD   Where patient plans to recover: At home with family  Fax number for surgical facility: [phone]    Type of Anesthesia Anticipated: to be determined    Assessment & Plan     The proposed surgical procedure is considered INTERMEDIATE risk.    Preop general physical exam  Ganglion cyst of wrist, right  Surgery planned for removal of ganglion cyst.     Major depressive disorder, recurrent episode, moderate (H)  Mood is well controlled on venlafaxine and prazosin.     Female-to-male transgender person  On testosterone therapy prescribed through PCP.        Risks and Recommendations:  The patient has the following additional risks and recommendations for perioperative complications:   - No identified additional risk factors other than previously addressed    Medication Instructions:  Patient is to take all scheduled medications on the day of surgery    RECOMMENDATION:  APPROVAL GIVEN to proceed with proposed procedure, without further diagnostic evaluation.        Subjective     HPI related to upcoming procedure: Ganglion cyst on right wrist. Present for past 5 years. Has had limited range of motion. Difficulty putting weight on the hand.     Preop Questions 12/21/2021   1. Have you ever had a heart attack or stroke? No   2. Have you ever had surgery on your heart or blood vessels, such as a stent placement, a coronary artery bypass, or surgery on an artery in your head, neck, heart, or legs? No   3. Do you have chest  pain with activity? No   4. Do you have a history of  heart failure? No   5. Do you currently have a cold, bronchitis or symptoms of other infection? No   6. Do you have a cough, shortness of breath, or wheezing? No   7. Do you or anyone in your family have previous history of blood clots? No   8. Do you or does anyone in your family have a serious bleeding problem such as prolonged bleeding following surgeries or cuts? No   9. Have you ever had problems with anemia or been told to take iron pills? YES - several years ago. Last Hb was 16.7 in May.    10. Have you had any abnormal blood loss such as black, tarry or bloody stools, or abnormal vaginal bleeding? No   11. Have you ever had a blood transfusion? No   12. Are you willing to have a blood transfusion if it is medically needed before, during, or after your surgery? Yes   13. Have you or any of your relatives ever had problems with anesthesia? No   14. Do you have sleep apnea, excessive snoring or daytime drowsiness? No   15. Do you have any artifical heart valves or other implanted medical devices like a pacemaker, defibrillator, or continuous glucose monitor? No   16. Do you have artificial joints? No   17. Are you allergic to latex? No   18. Is there any chance that you may be pregnant? No       Health Care Directive:  Patient does not have a Health Care Directive or Living Will: Patient states has Advance Directive and will bring in a copy to clinic.    Preoperative Review of :   reviewed - no record of controlled substances prescribed.    Status of Chronic Conditions:  See problem list for active medical problems.  Problems all longstanding and stable, except as noted/documented.  See ROS for pertinent symptoms related to these conditions.      Review of Systems  Constitutional, neuro, ENT, endocrine, pulmonary, cardiac, gastrointestinal, genitourinary, musculoskeletal, integument and psychiatric systems are negative, except as otherwise  noted.    Patient Active Problem List    Diagnosis Date Noted     Female-to-male transgender person 04/30/2021     Priority: Medium     Depersonalization-derealization disorder (H) 08/20/2018     Priority: Medium     PTSD (post-traumatic stress disorder) 07/12/2018     Priority: Medium     Major depressive disorder, recurrent episode, moderate (H) 07/12/2018     Priority: Medium      Past Medical History:   Diagnosis Date     Depressive disorder      Past Surgical History:   Procedure Laterality Date     BREAST SURGERY  2016    double mastectomy     Current Outpatient Medications   Medication Sig Dispense Refill     levonorgestrel (MIRENA) 20 MCG/24HR IUD 1 each by Intrauterine route once       prazosin (MINIPRESS) 1 MG capsule Take 1 capsule (1 mg) by mouth At Bedtime 90 capsule 0     testosterone cypionate (DEPOTESTOSTERONE) 200 MG/ML injection Inject 0.2 mLs (40 mg) into the muscle once a week 10 mL 0     venlafaxine (EFFEXOR-XR) 150 MG 24 hr capsule Take 1 capsule (150 mg) by mouth daily 90 capsule 0       Allergies   Allergen Reactions     Amoxicillin      hives        Social History     Tobacco Use     Smoking status: Never Smoker     Smokeless tobacco: Never Used   Substance Use Topics     Alcohol use: Never       History   Drug Use Unknown         Objective     /70   Pulse 85   Temp 98.6  F (37  C)   SpO2 97%     Physical Exam    GENERAL APPEARANCE: healthy, alert and no distress     EYES: EOMI,  PERRL     HENT: ear canals and TM's normal and nose and mouth without ulcers or lesions     NECK: no adenopathy, no asymmetry, masses, or scars and thyroid normal to palpation     RESP: lungs clear to auscultation - no rales, rhonchi or wheezes     CV: regular rates and rhythm, normal S1 S2, no S3 or S4 and no murmur, click or rub     ABDOMEN:  soft, nontender, no HSM or masses and bowel sounds normal     MS: extremities normal- no gross deformities noted and right wrist ganglion cyst     SKIN: no  suspicious lesions or rashes     NEURO: Normal strength and tone, sensory exam grossly normal, mentation intact and speech normal     PSYCH: mentation appears normal. and affect normal/bright     LYMPHATICS: No cervical adenopathy    Recent Labs   Lab Test 05/07/21  1457   HGB 16.4*         POTASSIUM 4.0   CR 0.86        Diagnostics:  No labs were ordered during this visit.   No EKG required, no history of coronary heart disease, significant arrhythmia, peripheral arterial disease or other structural heart disease.    Revised Cardiac Risk Index (RCRI):  The patient has the following serious cardiovascular risks for perioperative complications:   - No serious cardiac risks = 0 points     RCRI Interpretation: 0 points: Class I (very low risk - 0.4% complication rate)           Signed Electronically by: Myranda Doshi MD  Copy of this evaluation report is provided to requesting physician.

## 2021-12-22 NOTE — PATIENT INSTRUCTIONS
Preparing for Your Surgery  Getting started  A nurse will call you to review your health history and instructions. They will give you an arrival time based on your scheduled surgery time. Please be ready to share:    Your doctor's clinic name and phone number    Your medical, surgical and anesthesia history    A list of allergies and sensitivities    A list of medicines, including herbal treatments and over-the-counter drugs    Whether the patient has a legal guardian (ask how to send us the papers in advance)  Please tell us if you're pregnant--or if there's any chance you might be pregnant. Some surgeries may injure a fetus (unborn baby), so they require a pregnancy test. Surgeries that are safe for a fetus don't always need a test, and you can choose whether to have one.   If you have a child who's having surgery, please ask for a copy of Preparing for Your Child's Surgery.    Preparing for surgery    Within 30 days of surgery: Have a pre-op exam (sometimes called an H&P, or History and Physical). This can be done at a clinic or pre-operative center.  ? If you're having a , you may not need this exam. Talk to your care team.    At your pre-op exam, talk to your care team about all medicines you take. If you need to stop any medicines before surgery, ask when to start taking them again.  ? We do this for your safety. Many medicines can make you bleed too much during surgery. Some change how well surgery (anesthesia) drugs work.    Call your insurance company to let them know you're having surgery. (If you don't have insurance, call [phone].)    Call your clinic if there's any change in your health. This includes signs of a cold or flu (sore throat, runny nose, cough, rash, fever). It also includes a scrape or scratch near the surgery site.    If you have questions on the day of surgery, call your hospital or surgery center.  COVID testing  You may need to be tested for COVID-19 before having  surgery. If so, we will give you instructions.  Eating and drinking guidelines  For your safety: Unless your surgeon tells you otherwise, follow the guidelines below.    Eat and drink as usual until 8 hours before surgery. After that, no food or milk.    Drink clear liquids until 2 hours before surgery. These are liquids you can see through, like water, Gatorade and Propel Water. You may also have black coffee and tea (no cream or milk).    Nothing by mouth within 2 hours of surgery. This includes gum, candy and breath mints.    If you drink alcohol: Stop drinking it the night before surgery.    If your care team tells you to take medicine on the morning of surgery, it's okay to take it with a sip of water.  Preventing infection    Shower or bathe the night before and morning of your surgery. Follow the instructions your clinic gave you. (If no instructions, use regular soap.)    Don't shave or clip hair near your surgery site. We'll remove the hair if needed.    Don't smoke or vape the morning of surgery. You may chew nicotine gum up to 2 hours before surgery. A nicotine patch is okay.  ? Note: Some surgeries require you to completely quit smoking and nicotine. Check with your surgeon.    Your care team will make every effort to keep you safe from infection. We will:  ? Clean our hands often with soap and water (or an alcohol-based hand rub).  ? Clean the skin at your surgery site with a special soap that kills germs.  ? Give you a special gown to keep you warm. (Cold raises the risk of infection.)  ? Wear special hair covers, masks, gowns and gloves during surgery.  ? Give antibiotic medicine, if prescribed. Not all surgeries need antibiotics.  What to bring on the day of surgery    Photo ID and insurance card    Copy of your health care directive, if you have one    Glasses and hearing aides (bring cases)  ? You can't wear contacts during surgery    Inhaler and eye drops, if you use them (tell us about these when  you arrive)    CPAP machine or breathing device, if you use them    A few personal items, if spending the night    If you have . . .  ? A pacemaker, ICD (cardiac defibrillator) or other implant: Bring the ID card.  ? An implanted stimulator: Bring the remote control.  ? A legal guardian: Bring a copy of the certified (court-stamped) guardianship papers.  Please remove any jewelry, including body piercings. Leave jewelry and other valuables at home.  If you're going home the day of surgery    You must have a responsible adult drive you home. They should stay with you overnight as well.    If you don't have someone to stay with you, and you aren't safe to go home alone, we may keep you overnight. Insurance often won't pay for this.  After surgery  If it's hard to control your pain or you need more pain medicine, please call your surgeon's office.  Questions?   If you have any questions for your care team, list them here: _________________________________________________________________________________________________________________________________________________________________________ ____________________________________ ____________________________________ ____________________________________  For informational purposes only. Not to replace the advice of your health care provider. Copyright   2003, 2019 [facility]. All rights reserved. Clinically reviewed by Martha Lawson MD. [employer] [id number] - REV 07/21.

## 2022-01-15 SDOH — HEALTH STABILITY: PHYSICAL HEALTH: ON AVERAGE, HOW MANY DAYS PER WEEK DO YOU ENGAGE IN MODERATE TO STRENUOUS EXERCISE (LIKE A BRISK WALK)?: 3 DAYS

## 2022-01-15 SDOH — ECONOMIC STABILITY: INCOME INSECURITY: IN THE LAST 12 MONTHS, WAS THERE A TIME WHEN YOU WERE NOT ABLE TO PAY THE MORTGAGE OR RENT ON TIME?: NO

## 2022-01-15 SDOH — HEALTH STABILITY: PHYSICAL HEALTH: ON AVERAGE, HOW MANY MINUTES DO YOU ENGAGE IN EXERCISE AT THIS LEVEL?: 30 MIN

## 2022-01-15 SDOH — ECONOMIC STABILITY: FOOD INSECURITY: WITHIN THE PAST 12 MONTHS, THE FOOD YOU BOUGHT JUST DIDN'T LAST AND YOU DIDN'T HAVE MONEY TO GET MORE.: NEVER TRUE

## 2022-01-15 SDOH — ECONOMIC STABILITY: INCOME INSECURITY: HOW HARD IS IT FOR YOU TO PAY FOR THE VERY BASICS LIKE FOOD, HOUSING, MEDICAL CARE, AND HEATING?: HARD

## 2022-01-15 SDOH — ECONOMIC STABILITY: TRANSPORTATION INSECURITY
IN THE PAST 12 MONTHS, HAS THE LACK OF TRANSPORTATION KEPT YOU FROM MEDICAL APPOINTMENTS OR FROM GETTING MEDICATIONS?: NO

## 2022-01-15 SDOH — ECONOMIC STABILITY: FOOD INSECURITY: WITHIN THE PAST 12 MONTHS, YOU WORRIED THAT YOUR FOOD WOULD RUN OUT BEFORE YOU GOT MONEY TO BUY MORE.: NEVER TRUE

## 2022-01-15 SDOH — ECONOMIC STABILITY: TRANSPORTATION INSECURITY
IN THE PAST 12 MONTHS, HAS LACK OF TRANSPORTATION KEPT YOU FROM MEETINGS, WORK, OR FROM GETTING THINGS NEEDED FOR DAILY LIVING?: NO

## 2022-01-15 ASSESSMENT — ENCOUNTER SYMPTOMS
NAUSEA: 0
FEVER: 0
HEMATOCHEZIA: 0
FREQUENCY: 0
EYE PAIN: 0
SORE THROAT: 0
DIARRHEA: 0
WEAKNESS: 0
MYALGIAS: 0
SHORTNESS OF BREATH: 0
HEADACHES: 0
CONSTIPATION: 0
BREAST MASS: 0
CHILLS: 0
HEARTBURN: 0
HEMATURIA: 0
JOINT SWELLING: 0
DYSURIA: 0
COUGH: 0
PALPITATIONS: 0
DIZZINESS: 0
NERVOUS/ANXIOUS: 0
PARESTHESIAS: 0
ARTHRALGIAS: 0
ABDOMINAL PAIN: 0

## 2022-01-15 ASSESSMENT — SOCIAL DETERMINANTS OF HEALTH (SDOH)
HOW OFTEN DO YOU ATTEND CHURCH OR RELIGIOUS SERVICES?: NEVER
ARE YOU MARRIED, WIDOWED, DIVORCED, SEPARATED, NEVER MARRIED, OR LIVING WITH A PARTNER?: NEVER MARRIED
DO YOU BELONG TO ANY CLUBS OR ORGANIZATIONS SUCH AS CHURCH GROUPS UNIONS, FRATERNAL OR ATHLETIC GROUPS, OR SCHOOL GROUPS?: YES
IN A TYPICAL WEEK, HOW MANY TIMES DO YOU TALK ON THE PHONE WITH FAMILY, FRIENDS, OR NEIGHBORS?: NEVER
HOW OFTEN DO YOU GET TOGETHER WITH FRIENDS OR RELATIVES?: THREE TIMES A WEEK

## 2022-01-15 ASSESSMENT — LIFESTYLE VARIABLES
HOW MANY STANDARD DRINKS CONTAINING ALCOHOL DO YOU HAVE ON A TYPICAL DAY: PATIENT DECLINED
HOW OFTEN DO YOU HAVE A DRINK CONTAINING ALCOHOL: NEVER
HOW OFTEN DO YOU HAVE SIX OR MORE DRINKS ON ONE OCCASION: NEVER

## 2022-01-20 ENCOUNTER — OFFICE VISIT (OUTPATIENT)
Dept: PEDIATRICS | Facility: CLINIC | Age: 27
End: 2022-01-20
Payer: COMMERCIAL

## 2022-01-20 VITALS
TEMPERATURE: 98.3 F | HEART RATE: 95 BPM | SYSTOLIC BLOOD PRESSURE: 108 MMHG | DIASTOLIC BLOOD PRESSURE: 66 MMHG | WEIGHT: 206.4 LBS | OXYGEN SATURATION: 98 % | HEIGHT: 68 IN | BODY MASS INDEX: 31.28 KG/M2 | RESPIRATION RATE: 14 BRPM

## 2022-01-20 DIAGNOSIS — Z78.9 FEMALE-TO-MALE TRANSGENDER PERSON: ICD-10-CM

## 2022-01-20 DIAGNOSIS — F43.10 PTSD (POST-TRAUMATIC STRESS DISORDER): ICD-10-CM

## 2022-01-20 DIAGNOSIS — Z00.00 ROUTINE GENERAL MEDICAL EXAMINATION AT A HEALTH CARE FACILITY: Primary | ICD-10-CM

## 2022-01-20 DIAGNOSIS — F48.1 DEPERSONALIZATION-DEREALIZATION DISORDER (H): ICD-10-CM

## 2022-01-20 DIAGNOSIS — Z12.4 CERVICAL CANCER SCREENING: ICD-10-CM

## 2022-01-20 DIAGNOSIS — F33.1 MAJOR DEPRESSIVE DISORDER, RECURRENT EPISODE, MODERATE (H): ICD-10-CM

## 2022-01-20 PROCEDURE — 96127 BRIEF EMOTIONAL/BEHAV ASSMT: CPT | Performed by: NURSE PRACTITIONER

## 2022-01-20 PROCEDURE — 87491 CHLMYD TRACH DNA AMP PROBE: CPT | Performed by: NURSE PRACTITIONER

## 2022-01-20 PROCEDURE — 87591 N.GONORRHOEAE DNA AMP PROB: CPT | Performed by: NURSE PRACTITIONER

## 2022-01-20 PROCEDURE — 99395 PREV VISIT EST AGE 18-39: CPT | Performed by: NURSE PRACTITIONER

## 2022-01-20 PROCEDURE — 99214 OFFICE O/P EST MOD 30 MIN: CPT | Mod: 25 | Performed by: NURSE PRACTITIONER

## 2022-01-20 RX ORDER — VENLAFAXINE HYDROCHLORIDE 150 MG/1
150 CAPSULE, EXTENDED RELEASE ORAL DAILY
Qty: 90 CAPSULE | Refills: 1 | Status: SHIPPED | OUTPATIENT
Start: 2022-01-20 | End: 2022-07-25

## 2022-01-20 RX ORDER — PRAZOSIN HYDROCHLORIDE 1 MG/1
1 CAPSULE ORAL AT BEDTIME
Qty: 90 CAPSULE | Refills: 1 | Status: SHIPPED | OUTPATIENT
Start: 2022-01-20 | End: 2022-10-25

## 2022-01-20 ASSESSMENT — ENCOUNTER SYMPTOMS
NAUSEA: 0
PALPITATIONS: 0
DIZZINESS: 0
CHILLS: 0
SORE THROAT: 0
ABDOMINAL PAIN: 0
EYE PAIN: 0
NERVOUS/ANXIOUS: 0
WEAKNESS: 0
MYALGIAS: 0
DIARRHEA: 0
JOINT SWELLING: 0
DYSURIA: 0
ARTHRALGIAS: 0
HEMATURIA: 0
FEVER: 0
CONSTIPATION: 0
FREQUENCY: 0
SHORTNESS OF BREATH: 0
HEADACHES: 0
COUGH: 0

## 2022-01-20 ASSESSMENT — PATIENT HEALTH QUESTIONNAIRE - PHQ9
SUM OF ALL RESPONSES TO PHQ QUESTIONS 1-9: 3
SUM OF ALL RESPONSES TO PHQ QUESTIONS 1-9: 3
10. IF YOU CHECKED OFF ANY PROBLEMS, HOW DIFFICULT HAVE THESE PROBLEMS MADE IT FOR YOU TO DO YOUR WORK, TAKE CARE OF THINGS AT HOME, OR GET ALONG WITH OTHER PEOPLE: SOMEWHAT DIFFICULT

## 2022-01-20 ASSESSMENT — MIFFLIN-ST. JEOR: SCORE: 1719.72

## 2022-01-20 NOTE — PATIENT INSTRUCTIONS
Regarding the nightmares, we talked about three options:  1. Do nothing - maybe increase the prazosin, but this might not do the trick.  2. prozac bridge off effexor (70 mg effexor x 3-7 days, then 37.5 mg effexor WITH 20 mg prozac x 3-7 days, then JUST prozac)  3. Wean off effexor withOUT prozac.     You decided today to consider options and you can mychart update me with what you decide.     When you come for your preop, organize your injection day to be 2 days(gui) after your preop date.       Preventive Health Recommendations  Male Ages 26 - 39    Yearly exam:             See your health care provider every year in order to  o   Review health changes.   o   Discuss preventive care.    o   Review your medicines if your doctor has prescribed any.    You should be tested each year for STDs (sexually transmitted diseases), if you re at risk.     After age 35, talk to your provider about cholesterol testing. If you are at risk for heart disease, have your cholesterol tested at least every 5 years.     If you are at risk for diabetes, you should have a diabetes test (fasting glucose).  Shots: Get a flu shot each year. Get a tetanus shot every 10 years.     Nutrition:    Eat at least 5 servings of fruits and vegetables daily.     Eat whole-grain bread, whole-wheat pasta and brown rice instead of white grains and rice.     Get adequate Calcium and Vitamin D.     Lifestyle    Exercise for at least 150 minutes a week (30 minutes a day, 5 days a week). This will help you control your weight and prevent disease.     Limit alcohol to one drink per day.     No smoking.     Wear sunscreen to prevent skin cancer.     See your dentist every six months for an exam and cleaning.

## 2022-01-20 NOTE — PROGRESS NOTES
SUBJECTIVE:   CC: David Hutchinson is an 26 year old person who presents for preventative health visit.     Patient has been advised of split billing requirements and indicates understanding: Yes  Healthy Habits:     Getting at least 3 servings of Calcium per day:  Yes    Bi-annual eye exam:  NO    Dental care twice a year:  Yes    Sleep apnea or symptoms of sleep apnea:  None    Diet:  Regular (no restrictions)    Frequency of exercise:  2-3 days/week    Duration of exercise:  30-45 minutes    Taking medications regularly:  Yes    Medication side effects:  None    PHQ-2 Total Score: 0    Additional concerns today:  No    Concerns today: upcoming surgical revision  NOT fasting today    History of depression and ptsd, is on effexor, and prazosin. Has been noticing an increase in nightmares lately. They note a  of seeing their parents. They also wonder about the trigger of starting effexor. They had started effexor 3 yrs ago, when they were having dissociation, this isn't an issue for them anymore.     Regarding T, doin gself injections, likes it. Going well. Wondering about increasing to 0.5. went down because of the potential of hair loss, but didn't have it. Stopped finasteride. 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     Had ganglion cyst removal 2 wks ago of R side.     Has appointment for top revision surgery in march. They wonder about financial help for surgery as insurance denied coverage.     Is sexually active, partner has penis, oral, vag, anal receptive. Doesn't use condoms with consistent partner.     Today's PHQ-2 Score:   PHQ-2 ( 1999 Pfizer) 1/15/2022   Q1: Little interest or pleasure in doing things 0   Q2: Feeling down, depressed or hopeless 0   PHQ-2 Score 0   PHQ-2 Total Score (12-17 Years)- Positive if 3 or more points; Administer PHQ-A if positive -   Q1: Little interest or pleasure in doing things Not at all   Q2: Feeling down, depressed or hopeless  "Not at all   PHQ-2 Score 0     Abuse: Current or Past(Physical, Sexual or Emotional)- No  Do you feel safe in your environment? Yes    Have you ever done Advance Care Planning? (For example, a Health Directive, POLST, or a discussion with a medical provider or your loved ones about your wishes): No, advance care planning information given to patient to review.  Patient declined advance care planning discussion at this time.    Social History     Tobacco Use     Smoking status: Never Smoker     Smokeless tobacco: Never Used   Substance Use Topics     Alcohol use: Never     Alcohol Use 1/15/2022   Prescreen: >3 drinks/day or >7 drinks/week? Not Applicable     Last PSA: No results found for: PSA    Reviewed orders with patient. Reviewed health maintenance and updated orders accordingly - Yes  Lab work is in process    Reviewed and updated as needed this visit by clinical staff  Tobacco  Allergies  Meds   Med Hx  Surg Hx  Fam Hx  Soc Hx       Reviewed and updated as needed this visit by Provider    Meds                Review of Systems   Constitutional: Negative for chills and fever.   HENT: Negative for congestion, ear pain, hearing loss and sore throat.    Eyes: Negative for pain and visual disturbance.   Respiratory: Negative for cough and shortness of breath.    Cardiovascular: Negative for chest pain and palpitations.   Gastrointestinal: Negative for abdominal pain, constipation, diarrhea and nausea.   Genitourinary: Negative for dysuria, frequency, genital sores, hematuria and urgency.   Musculoskeletal: Negative for arthralgias, joint swelling and myalgias.   Skin: Negative for rash.   Neurological: Negative for dizziness, weakness and headaches.   Psychiatric/Behavioral: The patient is not nervous/anxious.        OBJECTIVE:   /66 (BP Location: Right arm, Cuff Size: Adult Large)   Pulse 95   Temp 98.3  F (36.8  C) (Tympanic)   Resp 14   Ht 1.727 m (5' 8\")   Wt 93.6 kg (206 lb 6.4 oz)   SpO2 98% "   BMI 31.38 kg/m      Physical Exam  GENERAL: healthy, alert and no distress  EYES: Eyes grossly normal to inspection, PERRL and conjunctivae and sclerae normal  HENT: ear canals and TM's normal, nose and mouth without ulcers or lesions  NECK: no adenopathy, no asymmetry, masses, or scars and thyroid normal to palpation  RESP: lungs clear to auscultation - no rales, rhonchi or wheezes  CV: regular rate and rhythm, normal S1 S2, no S3 or S4, no murmur, click or rub, no peripheral edema and peripheral pulses strong  MS: no gross musculoskeletal defects noted, no edema  SKIN: no suspicious lesions or rashes  PSYCH: mentation appears normal, affect normal/bright        ASSESSMENT/PLAN:   (Z00.00) Routine general medical examination at a health care facility  (primary encounter diagnosis)  Comment: declines pap today, will schedule when they can prepare mentally.   Plan: NEISSERIA GONORRHOEA PCR, CHLAMYDIA TRACHOMATIS        PCR, NEISSERIA GONORRHOEA PCR, CHLAMYDIA         TRACHOMATIS PCR, NEISSERIA GONORRHOEA PCR,         CHLAMYDIA TRACHOMATIS PCR          (F48.1) Depersonalization-derealization disorder (H)  Comment: is seeing therapist regularly, denies dissociating more recently  Plan:     (Z78.9) Female-to-male transgender person  Comment: Top surg 2016  Started T march 2015  Plan: Care Coordination Referral, Testosterone total,        CBC with platelets and differential,         Comprehensive metabolic panel (BMP + Alb, Alk         Phos, ALT, AST, Total. Bili, TP), HIV Antigen         Antibody Combo, Treponema Abs w Reflex to RPR         and Titer            (F33.1) Major depressive disorder, recurrent episode, moderate (H)  Comment: doing ok. Has had some increase in nightmares since starting effexor. We discussed options including weaning off effexor (with or without prozac bridge to minimize bothersome side effects) versus increasing prazosin. They will consider options and follow-up via [employer].   Plan:  "venlafaxine (EFFEXOR-XR) 150 MG 24 hr capsule          (F43.10) PTSD (post-traumatic stress disorder)  Comment: stable, continue with therapist  Plan: prazosin (MINIPRESS) 1 MG capsule          (Z12.4) Cervical cancer screening  Comment: would like to return to clinic to do this at next avail when they can prepare mentally  Plan:     COUNSELING:   Reviewed preventive health counseling, as reflected in patient instructions  Special attention given to:        Regular exercise       Healthy diet/nutrition       Family planning       Safe sex practices/STD prevention    Estimated body mass index is 31.38 kg/m  as calculated from the following:    Height as of this encounter: 1.727 m (5' 8\").    Weight as of this encounter: 93.6 kg (206 lb 6.4 oz).     Weight management plan: Discussed healthy diet and exercise guidelines    David Hutchinson reports that David Hutchinson has never smoked. David Hutchinson has never used smokeless tobacco.      Counseling Resources:  ATP IV Guidelines  Pooled Cohorts Equation Calculator  FRAX Risk Assessment  ICSI Preventive Guidelines  Dietary Guidelines for Americans, 2010  [employer]'s MyPlate  ASA Prophylaxis  Lung CA Screening    Melia Prieto, ELBA [facility] MEAGHAN  Answers for HPI/ROS submitted by the patient on 1/20/2022  If you checked off any problems, how difficult have these problems made it for you to do your work, take care of things at home, or get along with other people?: Somewhat difficult  PHQ9 TOTAL SCORE: 3  Blood in stool: No  heartburn: No  peripheral edema: No  mood changes: No  Skin sensation changes: No  pelvic pain: No  vaginal bleeding: No  vaginal discharge: No  tenderness: No  breast mass: No  breast discharge: No  impotence: No  penile discharge: No      "

## 2022-01-21 ENCOUNTER — PATIENT OUTREACH (OUTPATIENT)
Dept: CARE COORDINATION | Facility: CLINIC | Age: 27
End: 2022-01-21
Payer: COMMERCIAL

## 2022-01-21 ASSESSMENT — PATIENT HEALTH QUESTIONNAIRE - PHQ9: SUM OF ALL RESPONSES TO PHQ QUESTIONS 1-9: 3

## 2022-01-21 NOTE — PROGRESS NOTES
Clinic Care Coordination Contact  Community Health Worker Initial Outreach    Spoke to Patient (David)    CHW Introduced intent of call regarding PCP referral    Erza expressed that they are doing good. Further expressed that they are needing additional support in finding financial resources that they are eligible for. David further expressed that they are needing support regarding receiving coverage regarding upcoming surgery. Erza indicated that they were connected with an  in order to appeal the denial that Frantz's insurance sent. Erza wants to work with SW to establish other options Erza has regarding payment of Surgery and if they could get connected with [facility] regarding Payment Plans. CHW acknowledged.         CHW introduce Clinic Care Coordination and Patient responded in wanting to get connected with an FRW and SW.     CHW acknowledged and scheduled Erza for an CCC Phone Visit on 01/24/2022 at 2:00PM with EA CCC SW. Patient acknowledged.     CHW acknowledged and provide Patient with CHW contact information for additional support.     CHW Initial Information Gathering:  Referral Source: PCP  Current living arrangement:: Other (Lives with three other people)  Community Resources: None  No PCP office visit in Past Year: No  CHW Additional Questions  If ED/Hospital discharge, follow-up appointment scheduled as recommended?: N/A  Medication changes made following ED/Hospital discharge?: N/A  MyChart active?: Yes  Patient sent Social Determinants of Health questionnaire?: Yes    Patient accepts CC: Yes. Patient scheduled for assessment with [facility] KARLA on 01/24/2022 at 2:00PM. Patient noted desire to discuss  needing support regarding receiving coverage regarding upcoming surgery. and getting connected with billing department.     Financial Resource Worker Screening    County Benefits  Is patient requesting help applying for [facility] benefits?: Yes  Have you recently applied for any county benefits?:  No  How many people in your household?: 3  Do you buy/eat food together?: Yes    Any other information for the FRW?: Patient expressed not sure if they would be accepted for available county benefits, but would like to try.  Further expressing may concerns is upcoming surgey payment if Patient is not able to get coverage through insurance.    Care Coordination team will tell patient:   Thank you for answering all the questions, based on screening questions, our Financial Resource Worker will reach out to you with additional questions and next steps.    NORMA Morrissey  Clinic Care Coordination   [facility]   Phone: [phone]  [email or web address]

## 2022-01-22 LAB
C TRACH DNA SPEC QL NAA+PROBE: NEGATIVE
N GONORRHOEA DNA SPEC QL NAA+PROBE: NEGATIVE

## 2022-01-24 ENCOUNTER — PATIENT OUTREACH (OUTPATIENT)
Dept: NURSING | Facility: CLINIC | Age: 27
End: 2022-01-24
Payer: COMMERCIAL

## 2022-01-24 DIAGNOSIS — Z78.9 FEMALE-TO-MALE TRANSGENDER PERSON: ICD-10-CM

## 2022-01-24 NOTE — PROGRESS NOTES
Clinic Care Coordination Contact    Clinic Care Coordination Contact  OUTREACH    Referral Information:  Referral Source: PCP         Chief Complaint   Patient presents with     Clinic Care Coordination - Initial     Financial Resources        Clinical Concerns:  Current Medical Concerns:    Patient Active Problem List   Diagnosis     PTSD (post-traumatic stress disorder)     Major depressive disorder, recurrent episode, moderate (H)     Depersonalization-derealization disorder (H)     Female-to-male transgender person       [facility] outreached to pt on this date to assess for financial resource needs.     Pt indicates they make 3750/mo which puts them over income for most programs for financial assistance. Pt verifies they are working with specialty care coordination as well and speaks with them weekly.     Pt is working with specialty care coordination on insurance coverage for up coming costly surgery. Pt and SWCC reviewed working with Pt Financial Services on a payment plan is an option as well. Provided pt with number to Pt Financial Services. [facility] did discuss [facility] [employer], but warned pt they are likely over income for this as well. Offered to send information on [employer] via [employer].        Education Provided to patient: Patient Financial Services contact.       Health Maintenance Reviewed:    Health Maintenance Due   Topic Date Due     PAP  Never done       Clinical Pathway: None    Living Situation:  Current living arrangement:: Other (Lives with three other people)    Lifestyle & Psychosocial Needs:  Social Determinants of Health     Tobacco Use: Low Risk      Smoking Tobacco Use: Never Smoker     Smokeless Tobacco Use: Never Used   Alcohol Use: Not At Risk     Frequency of Alcohol Consumption: Never     Average Number of Drinks: Patient refused     Frequency of Binge Drinking: Never   Financial Resource Strain: High Risk     Difficulty of Paying Living Expenses: Hard   Food Insecurity: No Food Insecurity      Worried About Running Out of Food in the Last Year: Never true     Ran Out of Food in the Last Year: Never true   Transportation Needs: No Transportation Needs     Lack of Transportation (Medical): No     Lack of Transportation (Non-Medical): No   Physical Activity: Insufficiently Active     Days of Exercise per Week: 3 days     Minutes of Exercise per Session: 30 min   Stress: Stress Concern Present     Feeling of Stress : To some extent   Social Connections: Moderately Isolated     Frequency of Communication with Friends and Family: Never     Frequency of Social Gatherings with Friends and Family: Three times a week     Attends Alevism Services: Never     Active Member of Clubs or Organizations: Yes     Attends Club or Organization Meetings: Not on file     Marital Status: Never    Intimate Partner Violence: Not on file   Depression: Not at risk     PHQ-2 Score: 0   Housing Stability: Low Risk      Unable to Pay for Housing in the Last Year: No     Number of Places Lived in the Last Year: 1     Unstable Housing in the Last Year: No        Patient/Caregiver understanding: Pt reports understanding and denies any additional questions or concerns at this times. SW CC engaged in [employer] communication during encounter.         Future Appointments              In 1 month Deandra Busch MD [facility] Plastic and Reconstructive Surgery Clinic [facility]    In 1 month Melia Ortiz APRN CNP [facility]    In 1 month  COVID LAB [facility] Lab [facility]    In 2 months Deandra Busch MD [facility]    In 3 months Deandra Busch MD [facility]          Plan: Pt will continue to work with Specialty Care Coordination. Pt to outreach to Pt Financial Services to arrange for payment plan as needed.  [employer] message sent with [facility] application.     Brayan Whyte [facility]  Clinic Care Coordinator  [facility] M[facility]  [facility] [place]  [phone]  [email or web address]

## 2022-01-25 ENCOUNTER — PATIENT OUTREACH (OUTPATIENT)
Dept: CARE COORDINATION | Facility: CLINIC | Age: 27
End: 2022-01-25
Payer: COMMERCIAL

## 2022-01-25 NOTE — TELEPHONE ENCOUNTER
Clinic Care Coordination Contact  Program: [facility]  County:  [facility] Case #:  [facility] Worker:   Sakshi #:   Subscriber #:   Renewal:  Date Applied:     FRW Outreach:   1/25/22: Outreach attempted x 1. Left message on voicemail with call back information and requested return call.  Plan: FRW will call again within one week.         Health Insurance:      Referral/Screening:      Goals Addressed:

## 2022-01-31 ENCOUNTER — PATIENT OUTREACH (OUTPATIENT)
Dept: CARE COORDINATION | Facility: CLINIC | Age: 27
End: 2022-01-31
Payer: COMMERCIAL

## 2022-01-31 NOTE — TELEPHONE ENCOUNTER
Clinic Care Coordination Contact  Program: [facility]  County:  [facility] Case #:  [facility] Worker:   Sakshi #:   Subscriber #:   Renewal:  Date Applied:     FRW Outreach:   1/31/22: Outreach attempted x 2. Left message on voicemail indicating last outreach attempt.   Plan: FRW will send an unreachable letter and remove from panel. Patient can be referred back to FRW.      1/25/22: Outreach attempted x 1. Left message on voicemail with call back information and requested return call.  Plan: FRW will call again within one week.         Health Insurance:      Referral/Screening:      Goals Addressed:

## 2022-02-16 DIAGNOSIS — Z11.59 ENCOUNTER FOR SCREENING FOR OTHER VIRAL DISEASES: Primary | ICD-10-CM

## 2022-03-07 NOTE — PATIENT INSTRUCTIONS
Bilateral Mastectomy   Pre and Post op Surgery Instructions    You are scheduled for top surgery with nipple grafts on 3/21/22 at 9:20 AM    You will need to arrive at 7:20 [place] [address]. You can park in the [address] and check in on 3rd floor. (See attached map).     - Please make sure you have someone to drive you home after your surgery and you will need someone to stay with you at home 24 hours after your surgery.    The surgery will be about 3-4 hours long. You will be in recovery afterwards for about 1-2 hours.     Before Surgery:  - 8 hours prior to surgery stop eating solid food. You can continue to drink clear liquids (water, apple juice, Gatorade) up to 2 hours before surgery. If you have any medications that need to be taken in this timeframe, you can take them with a small sip of water    - No Ibuprofen, Advil, Aleve, Naproxen, Motrin, Aspirin for 7 days prior to surgery. If you are having pain in the week before surgery Tylenol is okay to take.    - Wear or bring a button up or zip up shirt with you to the hospital.    - Wash with surgical soap:      Showering with an antiseptic soap prior to an invasive procedure will decrease the  bacteria that is normally found on the skin.     Using 1/2 of the bottle for each application.  Be sure to use the whole bottle before coming to surgery.    The evening before surgery, shower or bathe as you normally would, using your preferred soap.  Give special attention to areas where the incisions will be made. Rinse thoroughly.  You may wash your hair with your regular shampoo.     Next wash your entire body, from the chin down, with the special antiseptic soap (full strength) using a freshly laundered clean washcloth, again giving special attention to areas where incisions will be made.    Rinse thoroughly and dry off using a freshly laundered clean towel.     Wear clean clothes/pajamas  and sleep on clean sheets    Repeat steps 2 and 3 in the morning before coming to surgery (using the rest of the bottle of soap).     **If you have a reaction to the surgical soap, let the nurse know.     Events of day:     -Check in on the 3rd floor of the hospital for your surgery.    Pre-op     - After you check in, you will meet with a pre-op nurse. They will go over your medications, review your H&P (pre-op physical), have you change into a paper gown, and your chest will be wiped again with soap.    - They will place compression boots on both legs to help decrease risk of blood clots.     - You may be asked to complete a pregnancy test. If you have had no exposure to sperm, you can decline.    - You will meet with the anesthesiologists and nurse anesthetist who will be keeping you asleep during surgery, they will be placing an IV, and will be monitoring you throughout the surgery.    - You will meet with the RN as you are being moved into operating room, they will be helping to position you and keep you comfortable during the surgery.     - Dr. Busch will meet you in the pre-op area to discuss any questions about surgery, make markings on your chest for planning, and to sign your consent.     Intra-op (OR)    - A catheter will be placed in your bladder after you are sleeping and is typically removed before you wake up. The longest this would typically be in is in the post-op recovery room if needed.     - There will be straps and pillows to help position you and keep you in place during the surgery.    - The tissue that is removed will be sent to pathology to be analyzed and then discarded.     - STANTON drains will be placed (one in each armpit) and a pain catheter will be placed in the center of your chest.     - Closure is done with dissolvable sutures under the skin and is then sealed with glue, and tape.    Post-op/Recovery    - You can usually go home the same day so long as you are eating, drinking,  "voiding, and pain is well controlled.  You will be sent home with all needed supplies.     - Post-Op medications you will be given in addition to the anesthesia include: Zofran (nausea), Oxycodone (pain), Z-jessica (infection), and antipruritic (itching).     - You will leave the hospitals with an ace bandage wrapped around your chest for compression. You may keep the ace bandage on until you come back for your one week post op visit or you may adjust the wrap as needed if it is either too tight or too loose.     Post-Op Cares:     - No lifting over 5 lbs for 3 weeks after surgery    - No stretching arms out or above the head (\"modified T-corina\")    - Walk around frequently to help prevent blood clots    - Do deep breathing exercises to prevent pneumonia    - No sleeping on stomach x 3 weeks after surgery, if it is difficult not to roll over onto your stomach you can sleep in a recliner or use additional pillows    - Do not use any ice packs or heat packs    - Preventing constipation will be your responsibility. You can use whatever method works best for you such as; aloe, prune juice, Sennokot or Miralax.    No showering in the first week after your surgery.     What to expect at your 1st post op visit:    When you come in for your 1st post op visit, we will assess the output of your drains and remove the pain catheter (On-Q) that was placed on your chest.     -Please remember to bring the documentations of your output with you to your appointment so we can review how much your drains have been putting out since your surgery.     -We will remove the bolsters that was placed on your nipples and teach you how to perform nipple graft dressings.     -You will be given supplies to take home and will need to continue wearing the ace wrap compression for another week after the drains are removed.       Nipple Care:   Change nipple dressings daily.  Take dressings off prior to showering and reapply after showering.  No direct " shower spray on nipple grafts for 4 weeks.     Cut vaseline gauze to nipple size and place over nipple.  Place folded white gauze over vaseline gauze and cover with plastic dressing.  Do dressing changes for 1 more week.  If you continue to have drainage, continue the dressings until drainage stops.       Drain Care:  You will be discharged with Reagan-Yeung (STANTON) drainage tubes.  These tubes drain fluid from your incision, helping to prevent swelling and reducing the risk for infection.  The tube is held in place by a few stitches. Pin your STANTON drain to your clothing by using a safety pin through the plastic loop on the top of the bulb. If the drain is not attached to your clothing, it may pull out from under your skin. Drains are uncomfortable!    Emptying the drain bulb: The measuring cup provided by the hospital or a measuring cup that is used only for the STANTON drain.  1. Wash your hands with soap and water.  2. Hold the bulb securely.  3. Remove the drainage plug from the emptying port.  4. Carefully turn the bulb upside down over the measuring cup, and gently squeeze all of the drainage into the measuring cup.  5. Squeeze the middle of the bulb firmly so that the bulb is as flat as possible.                                                                                                                                                    6. While still squeezing the bulb, replace the drainage plug. This step is important to keep the drain suction  7. Measure how much fluid you removed from the bulb.  8. Write down the amount and color of the fluid you removed from the bulb. If  you have more than one drain, keep a separate record for each one.  9. Empty the fluid into the toilet and flush.  10. Rinse the measuring cup, and wash your hands with soap and water.  11. You should empty the drain at least two times each day, in the morning and at bedtime.  12. Drainage tubes are removed when the output is less than 20ml  in a 24 hour period for 2 consecutive days.  13. Output will be somewhere between 30 to 50 mLs per day, but don't be alarmed if it is more or less. Output may not be equal between sides. Amounts will probably decrease over time.  14. The color coming from the drains may vary from deep red, light pink, or clear yellow.    Stripping the tube:  To prevent clots from blocking the drain, you will need to  strip  it. Stripping means that you use your fingers to squeeze along the length of the drain to help maintain the flow of drainage.    Wash your hands.    Using one hand, firmly hold the tubing near the insertion site (as close to your skin as the ace wrap and gauze dressing will allow). This will prevent the drain from being pulled out while you are stripping it and from pulling on skin and sutures.    [place] upper/top fingers and milk with the bottom or lower fingers.    Using your index finger and thumb of the other hand, squeeze the tubing below the  first hand. You should squeeze it firmly enough so the tubing becomes flat.     Maintain pressure on the tube, as you are squeezing, slide your index finger and thumb down the tube about 4-6 inches toward the bulb. (use alcohol wipes or lotion to help).    Then, release the hand closest to where the tube is attached to the body (making sure to keep pressure with the other hand), and move upper/anchor hand down. Squeeze, Repeat in segments.    Do not release the pressure you are creating in the tubing until you reach the bulb.  The whole tube will be flat.     If at any time it feels like the tube is pulling away from the skin or starts to hurt STOP! Then start over again more gently.     Strip the drain each time you empty it.

## 2022-03-08 ENCOUNTER — OFFICE VISIT (OUTPATIENT)
Dept: PLASTIC SURGERY | Facility: CLINIC | Age: 27
End: 2022-03-08
Payer: COMMERCIAL

## 2022-03-08 VITALS
DIASTOLIC BLOOD PRESSURE: 79 MMHG | TEMPERATURE: 98.3 F | SYSTOLIC BLOOD PRESSURE: 121 MMHG | BODY MASS INDEX: 31.52 KG/M2 | HEIGHT: 68 IN | OXYGEN SATURATION: 97 % | WEIGHT: 208 LBS | HEART RATE: 111 BPM

## 2022-03-08 DIAGNOSIS — Z90.13 S/P BILATERAL MASTECTOMY: ICD-10-CM

## 2022-03-08 DIAGNOSIS — F64.0 GENDER DYSPHORIA IN ADOLESCENT AND ADULT: Primary | ICD-10-CM

## 2022-03-08 PROCEDURE — 99213 OFFICE O/P EST LOW 20 MIN: CPT | Performed by: SURGERY

## 2022-03-08 ASSESSMENT — PAIN SCALES - GENERAL: PAINLEVEL: NO PAIN (0)

## 2022-03-08 NOTE — LETTER
"3/8/2022       RE: Syed Hutchinson  [address]  [facility] [address]     Dear Colleague,    Thank you for referring your patient, Syed Hutchinson, to the [facility] [place] at [facility]. Please see a copy of my visit note below.    PLASTICS PRE-OP  This is a 26 year old biological female who identifies as trans and presents for their pre-op visit prior to bilateral mastectomy revision with possible nipple re-grafting scheduled for 3/21. Their original top surgery was done in [place] and they were left with low incisions, residual lateral dogears, low and oblique nipple placement and prominent anterior axillary lipodystrophy. David is now thinking they may want to keep their nipple grafts and can always use tattooing for any necessary touchups or nipple healing problems. History and physical is scheduled for next week. COVID test is scheduled for next Friday.     David had several questions about the availability of \"surgical trauma reduction anesthesia\" so I referred them to our anesthesia team on the day of surgery.   They also were interested in having OnQ pump if there is a need for STANTON drains depending on the extent of mastectomy revision. They mentioned that they had had some spitting sutures during their last recovery.     They are prepared to take at least 1 month off from work and have their partner and roommates (previous patients or ours) available to help with postop cares.     Our nursing clinic staff discussed periop instructions with the patient including: not eating anything 8 hours prior to surgery, drinking clear liquids up to 2 hours before surgery except for morning medications with a sip of water, the preop shower with surgical soap which was given, and wearing a button- or zip-up shirt on the day of surgery. The patient was also instructed to avoid NSAIDs x 1 week both before AND after surgery, " but he may take Tylenol post-op for pain as needed. The patient was provided with a folder of information on andrew-op topics, including where the surgery will be.     I discussed the following with the patient; preop, intraop and postop phases of care on the day of surgery, the placement of a bladder catheter during surgery that will likely be removed in recovery, postop cares and limitations with relation to home and work settings, 5-lb weight restriction for the first 3 weeks postop, and how long to maintain limited activities. We also discussed Zofran, oxycodone, Z-jessica, and antipruritics which will be prescribed. We discussed that preventing constipation will be their responsibility, and we discussed methods such as aloe, prune juice or Miralax.     In addition, we went over the possible risks and complications involved with this elective procedure. These include but are not limited to: infection, bleeding, hematoma/seroma formation, and poor healing (including dehiscence, nipple graft loss, or hypertrophic scarring). We also discussed the possibility of altered chest sensation (either hypo or hypersensitive), residual deformities and asymmetries, possible further surgical revisions, and possible injury to surrounding neurovascular and musculoskeletal structures, including intra-axillary or intra-thoracic. We lastly discussed anesthetic risks including DVT/PE or cardiopulmonary events.      All questions were answered. David will bring any other questions that arise to the day of surgery.    Total time = 20 minutes, spent on the date of encounter doing chart review, history and physical, dressing changes, documentation, patient education, and any further activity as noted above.        Deandra Busch MD

## 2022-03-08 NOTE — NURSING NOTE
"Chief Complaint   Patient presents with     SRIKANTH Hernandez, is being seen today for a Pre-op DOS 3/21/22.       Vitals:    03/08/22 1418   BP: 121/79   BP Location: Left arm   Patient Position: Chair   Cuff Size: Adult Large   Pulse: 111   Temp: 98.3  F (36.8  C)   TempSrc: Oral   SpO2: 97%   Weight: 94.3 kg (208 lb)   Height: 1.727 m (5' 8\")       Body mass index is 31.63 kg/m .      Jodee Schilling LPN    "

## 2022-03-11 ENCOUNTER — PREP FOR PROCEDURE (OUTPATIENT)
Dept: SURGERY | Facility: CLINIC | Age: 27
End: 2022-03-11
Payer: COMMERCIAL

## 2022-03-14 ENCOUNTER — OFFICE VISIT (OUTPATIENT)
Dept: PEDIATRICS | Facility: CLINIC | Age: 27
End: 2022-03-14
Payer: COMMERCIAL

## 2022-03-14 VITALS
OXYGEN SATURATION: 98 % | WEIGHT: 213.4 LBS | HEIGHT: 68 IN | BODY MASS INDEX: 32.34 KG/M2 | TEMPERATURE: 97.3 F | SYSTOLIC BLOOD PRESSURE: 120 MMHG | DIASTOLIC BLOOD PRESSURE: 80 MMHG | HEART RATE: 111 BPM

## 2022-03-14 DIAGNOSIS — Z01.818 PREOP GENERAL PHYSICAL EXAM: Primary | ICD-10-CM

## 2022-03-14 DIAGNOSIS — Z78.9 FEMALE-TO-MALE TRANSGENDER PERSON: ICD-10-CM

## 2022-03-14 LAB
BASOPHILS # BLD AUTO: 0 10E3/UL (ref 0–0.2)
BASOPHILS NFR BLD AUTO: 0 %
EOSINOPHIL # BLD AUTO: 0 10E3/UL (ref 0–0.7)
EOSINOPHIL NFR BLD AUTO: 0 %
ERYTHROCYTE [DISTWIDTH] IN BLOOD BY AUTOMATED COUNT: 12.8 % (ref 10–15)
HCT VFR BLD AUTO: 49.2 % (ref 35–53)
HGB BLD-MCNC: 16.6 G/DL (ref 11.7–17.7)
LYMPHOCYTES # BLD AUTO: 2.7 10E3/UL (ref 0.8–5.3)
LYMPHOCYTES NFR BLD AUTO: 37 %
MCH RBC QN AUTO: 28.6 PG (ref 26.5–33)
MCHC RBC AUTO-ENTMCNC: 33.7 G/DL (ref 31.5–36.5)
MCV RBC AUTO: 85 FL (ref 78–100)
MONOCYTES # BLD AUTO: 0.6 10E3/UL (ref 0–1.3)
MONOCYTES NFR BLD AUTO: 8 %
NEUTROPHILS # BLD AUTO: 4 10E3/UL (ref 1.6–8.3)
NEUTROPHILS NFR BLD AUTO: 55 %
PLATELET # BLD AUTO: 198 10E3/UL (ref 150–450)
RBC # BLD AUTO: 5.81 10E6/UL (ref 3.8–5.9)
WBC # BLD AUTO: 7.3 10E3/UL (ref 4–11)

## 2022-03-14 PROCEDURE — 87389 HIV-1 AG W/HIV-1&-2 AB AG IA: CPT | Performed by: NURSE PRACTITIONER

## 2022-03-14 PROCEDURE — 84403 ASSAY OF TOTAL TESTOSTERONE: CPT | Performed by: NURSE PRACTITIONER

## 2022-03-14 PROCEDURE — 86780 TREPONEMA PALLIDUM: CPT | Performed by: NURSE PRACTITIONER

## 2022-03-14 PROCEDURE — 36415 COLL VENOUS BLD VENIPUNCTURE: CPT | Performed by: NURSE PRACTITIONER

## 2022-03-14 PROCEDURE — 99214 OFFICE O/P EST MOD 30 MIN: CPT | Performed by: NURSE PRACTITIONER

## 2022-03-14 PROCEDURE — 85025 COMPLETE CBC W/AUTO DIFF WBC: CPT | Performed by: NURSE PRACTITIONER

## 2022-03-14 PROCEDURE — 80053 COMPREHEN METABOLIC PANEL: CPT | Performed by: NURSE PRACTITIONER

## 2022-03-14 NOTE — PATIENT INSTRUCTIONS
Preparing for Your Surgery  Getting started  A nurse will call you to review your health history and instructions. They will give you an arrival time based on your scheduled surgery time. Please be ready to share:    Your doctor's clinic name and phone number    Your medical, surgical and anesthesia history    A list of allergies and sensitivities    A list of medicines, including herbal treatments and over-the-counter drugs    Whether the patient has a legal guardian (ask how to send us the papers in advance)  Please tell us if you're pregnant--or if there's any chance you might be pregnant. Some surgeries may injure a fetus (unborn baby), so they require a pregnancy test. Surgeries that are safe for a fetus don't always need a test, and you can choose whether to have one.   If you have a child who's having surgery, please ask for a copy of Preparing for Your Child's Surgery.    Preparing for surgery    Within 30 days of surgery: Have a pre-op exam (sometimes called an H&P, or History and Physical). This can be done at a clinic or pre-operative center.  ? If you're having a , you may not need this exam. Talk to your care team.    At your pre-op exam, talk to your care team about all medicines you take. If you need to stop any medicines before surgery, ask when to start taking them again.  ? We do this for your safety. Many medicines can make you bleed too much during surgery. Some change how well surgery (anesthesia) drugs work.    Call your insurance company to let them know you're having surgery. (If you don't have insurance, call [phone].)    Call your clinic if there's any change in your health. This includes signs of a cold or flu (sore throat, runny nose, cough, rash, fever). It also includes a scrape or scratch near the surgery site.    If you have questions on the day of surgery, call your hospital or surgery center.  COVID testing  You may need to be tested for COVID-19 before having  surgery. If so, your surgical team will give you instructions for scheduling this test, separate from your preoperative history and physical.  Eating and drinking guidelines  For your safety: Unless your surgeon tells you otherwise, follow the guidelines below.    Eat and drink as usual until 8 hours before surgery. After that, no food or milk.    Drink clear liquids until 2 hours before surgery. These are liquids you can see through, like water, Gatorade and Propel Water. You may also have black coffee and tea (no cream or milk).    Nothing by mouth within 2 hours of surgery. This includes gum, candy and breath mints.    If you drink alcohol: Stop drinking it the night before surgery.    If your care team tells you to take medicine on the morning of surgery, it's okay to take it with a sip of water.  Preventing infection    Shower or bathe the night before and morning of your surgery. Follow the instructions your clinic gave you. (If no instructions, use regular soap.)    Don't shave or clip hair near your surgery site. We'll remove the hair if needed.    Don't smoke or vape the morning of surgery. You may chew nicotine gum up to 2 hours before surgery. A nicotine patch is okay.  ? Note: Some surgeries require you to completely quit smoking and nicotine. Check with your surgeon.    Your care team will make every effort to keep you safe from infection. We will:  ? Clean our hands often with soap and water (or an alcohol-based hand rub).  ? Clean the skin at your surgery site with a special soap that kills germs.  ? Give you a special gown to keep you warm. (Cold raises the risk of infection.)  ? Wear special hair covers, masks, gowns and gloves during surgery.  ? Give antibiotic medicine, if prescribed. Not all surgeries need antibiotics.  What to bring on the day of surgery    Photo ID and insurance card    Copy of your health care directive, if you have one    Glasses and hearing aides (bring cases)  ? You can't  wear contacts during surgery    Inhaler and eye drops, if you use them (tell us about these when you arrive)    CPAP machine or breathing device, if you use them    A few personal items, if spending the night    If you have . . .  ? A pacemaker, ICD (cardiac defibrillator) or other implant: Bring the ID card.  ? An implanted stimulator: Bring the remote control.  ? A legal guardian: Bring a copy of the certified (court-stamped) guardianship papers.  Please remove any jewelry, including body piercings. Leave jewelry and other valuables at home.  If you're going home the day of surgery    You must have a responsible adult drive you home. They should stay with you overnight as well.    If you don't have someone to stay with you, and you aren't safe to go home alone, we may keep you overnight. Insurance often won't pay for this.  After surgery  If it's hard to control your pain or you need more pain medicine, please call your surgeon's office.  Questions?   If you have any questions for your care team, list them here: _________________________________________________________________________________________________________________________________________________________________________ ____________________________________ ____________________________________ ____________________________________  For informational purposes only. Not to replace the advice of your health care provider. Copyright   2003, 2019 [facility]. All rights reserved. Clinically reviewed by Martha Lawson MD. [employer] [id number] - REV 07/21.

## 2022-03-14 NOTE — PROGRESS NOTES
[facility]  [address]  [address]  [place] [address]  Phone: [phone]  Fax: [phone]  Primary Provider: Melia Ortiz  Pre-op Performing Provider: MELIA ORTIZ      PREOPERATIVE EVALUATION:  Today's date: 3/14/2022    Syed Hutchinson is a 26 year old adult who presents for a preoperative evaluation.    Surgical Information:  Surgery/Procedure: mastectomy revision bilateral   Surgery Location: [facility]  Surgeon:   Surgery Date: 3/21/2022  Time of Surgery: 720am  Where patient plans to recover: At home with family  Fax number for surgical facility: Note does not need to be faxed, will be available electronically in Epic.    Type of Anesthesia Anticipated: General    Assessment & Plan     The proposed surgical procedure is considered INTERMEDIATE risk.    Preop general physical exam      Female-to-male transgender person    - Testosterone total  - CBC with platelets and differential  - Comprehensive metabolic panel (BMP + Alb, Alk Phos, ALT, AST, Total. Bili, TP)  - Treponema Abs w Reflex to RPR and Titer  - HIV Antigen Antibody Combo    Female-to-male transgender person    - Testosterone total  - CBC with platelets and differential  - Comprehensive metabolic panel (BMP + Alb, Alk Phos, ALT, AST, Total. Bili, TP)  - Treponema Abs w Reflex to RPR and Titer  - HIV Antigen Antibody Combo         Risks and Recommendations:  The patient has the following additional risks and recommendations for perioperative complications:   - No identified additional risk factors other than previously addressed    Medication Instructions:  Patient is to take all scheduled medications on the day of surgery    RECOMMENDATION:  APPROVAL GIVEN to proceed with proposed procedure, without further diagnostic evaluation.          Subjective     HPI related to upcoming procedure: revision of top surgrey    Has covid test on Friday.     Last T  injection was Wednesday.     Preop Questions 3/9/2022   1. Have you ever had a heart attack or stroke? No   2. Have you ever had surgery on your heart or blood vessels, such as a stent placement, a coronary artery bypass, or surgery on an artery in your head, neck, heart, or legs? No   3. Do you have chest pain with activity? No   4. Do you have a history of  heart failure? No   5. Do you currently have a cold, bronchitis or symptoms of other infection? No   6. Do you have a cough, shortness of breath, or wheezing? No   7. Do you or anyone in your family have previous history of blood clots? No   8. Do you or does anyone in your family have a serious bleeding problem such as prolonged bleeding following surgeries or cuts? No   9. Have you ever had problems with anemia or been told to take iron pills? YES -    10. Have you had any abnormal blood loss such as black, tarry or bloody stools, or abnormal vaginal bleeding? No   11. Have you ever had a blood transfusion? No   12. Are you willing to have a blood transfusion if it is medically needed before, during, or after your surgery? Yes   13. Have you or any of your relatives ever had problems with anesthesia? No   14. Do you have sleep apnea, excessive snoring or daytime drowsiness? No   15. Do you have any artifical heart valves or other implanted medical devices like a pacemaker, defibrillator, or continuous glucose monitor? No   16. Do you have artificial joints? No   17. Are you allergic to latex? No   18. Is there any chance that you may be pregnant? No       Health Care Directive:  Patient does not have a Health Care Directive or Living Will: Patient states has Advance Directive and will bring in a copy to clinic.    Preoperative Review of :   reviewed - no record of controlled substances prescribed.        Review of Systems  CONSTITUTIONAL: NEGATIVE for fever, chills, change in weight  INTEGUMENTARY/SKIN: NEGATIVE for worrisome rashes, moles or  lesions  EYES: NEGATIVE for vision changes or irritation  ENT/MOUTH: NEGATIVE for ear, mouth and throat problems  RESP: NEGATIVE for significant cough or SOB  CV: NEGATIVE for chest pain, palpitations or peripheral edema  GI: NEGATIVE for nausea, abdominal pain, heartburn, or change in bowel habits  : NEGATIVE for frequency, dysuria, or hematuria  MUSCULOSKELETAL: NEGATIVE for significant arthralgias or myalgia  NEURO: NEGATIVE for weakness, dizziness or paresthesias  ENDOCRINE: NEGATIVE for temperature intolerance, skin/hair changes  HEME: NEGATIVE for bleeding problems  PSYCHIATRIC: NEGATIVE for changes in mood or affect    Patient Active Problem List    Diagnosis Date Noted     Female-to-male transgender person 04/30/2021     Priority: Medium     Depersonalization-derealization disorder (H) 08/20/2018     Priority: Medium     PTSD (post-traumatic stress disorder) 07/12/2018     Priority: Medium     Major depressive disorder, recurrent episode, moderate (H) 07/12/2018     Priority: Medium      Past Medical History:   Diagnosis Date     Depressive disorder      Past Surgical History:   Procedure Laterality Date     BREAST SURGERY  2016    double mastectomy     ganglion cyst removal- right  01/2022     Current Outpatient Medications   Medication Sig Dispense Refill     levonorgestrel (MIRENA) 20 MCG/24HR IUD 1 each by Intrauterine route once       prazosin (MINIPRESS) 1 MG capsule Take 1 capsule (1 mg) by mouth At Bedtime 90 capsule 1     testosterone cypionate (DEPOTESTOSTERONE) 200 MG/ML injection Inject 0.2 mLs (40 mg) into the muscle once a week (Patient taking differently: Inject 40 mg Subcutaneous once a week ) 10 mL 0     venlafaxine (EFFEXOR-XR) 150 MG 24 hr capsule Take 1 capsule (150 mg) by mouth daily 90 capsule 1       Allergies   Allergen Reactions     Amoxicillin      hives        Social History     Tobacco Use     Smoking status: Never Smoker     Smokeless tobacco: Never Used   Substance Use Topics  "    Alcohol use: Never     Family History   Problem Relation Age of Onset     No Known Problems Mother      Depression Father      Anxiety Disorder Father      Hypertension Paternal Grandfather      Hyperlipidemia Paternal Grandfather      Other Cancer Paternal Grandfather         skin     Breast Cancer Paternal Grandmother      Depression Sister      Anxiety Disorder Sister      Thyroid Disease Sister      History   Drug Use Unknown         Objective     /80   Pulse 111   Temp 97.3  F (36.3  C) (Tympanic)   Ht 1.727 m (5' 8\")   Wt 96.8 kg (213 lb 6.4 oz)   SpO2 98%   BMI 32.45 kg/m      Physical Exam    GENERAL APPEARANCE: healthy, alert and no distress     EYES: EOMI,  PERRL     HENT: ear canals and TM's normal and nose and mouth without ulcers or lesions     NECK: no adenopathy, no asymmetry, masses, or scars and thyroid normal to palpation     RESP: lungs clear to auscultation - no rales, rhonchi or wheezes     CV: regular rates and rhythm, normal S1 S2, no S3 or S4 and no murmur, click or rub     MS: extremities normal- no gross deformities noted, no evidence of inflammation in joints, FROM in all extremities.     SKIN: no suspicious lesions or rashes     NEURO: Normal strength and tone, sensory exam grossly normal, mentation intact and speech normal     PSYCH: mentation appears normal. and affect normal/bright     LYMPHATICS: No cervical adenopathy    Recent Labs   Lab Test 05/07/21  1457   HGB 16.4*         POTASSIUM 4.0   CR 0.86        Diagnostics:  No labs were ordered during this visit.   No EKG required, no history of coronary heart disease, significant arrhythmia, peripheral arterial disease or other structural heart disease.    Revised Cardiac Risk Index (RCRI):  The patient has the following serious cardiovascular risks for perioperative complications:   - No serious cardiac risks = 0 points     RCRI Interpretation: 0 points: Class I (very low risk - 0.4% complication " rate)           Signed Electronically by: ELBA Jenkins CNP  Copy of this evaluation report is provided to requesting physician.

## 2022-03-14 NOTE — H&P (VIEW-ONLY)
[facility]  [address]  [address]  [place] [address]  Phone: [phone]  Fax: [phone]  Primary Provider: Melia Ortiz  Pre-op Performing Provider: MELIA ORTIZ      PREOPERATIVE EVALUATION:  Today's date: 3/14/2022    Syed Hutchinson is a 26 year old adult who presents for a preoperative evaluation.    Surgical Information:  Surgery/Procedure: mastectomy revision bilateral   Surgery Location: [facility]  Surgeon:   Surgery Date: 3/21/2022  Time of Surgery: 720am  Where patient plans to recover: At home with family  Fax number for surgical facility: Note does not need to be faxed, will be available electronically in Epic.    Type of Anesthesia Anticipated: General    Assessment & Plan     The proposed surgical procedure is considered INTERMEDIATE risk.    Preop general physical exam      Female-to-male transgender person    - Testosterone total  - CBC with platelets and differential  - Comprehensive metabolic panel (BMP + Alb, Alk Phos, ALT, AST, Total. Bili, TP)  - Treponema Abs w Reflex to RPR and Titer  - HIV Antigen Antibody Combo    Female-to-male transgender person    - Testosterone total  - CBC with platelets and differential  - Comprehensive metabolic panel (BMP + Alb, Alk Phos, ALT, AST, Total. Bili, TP)  - Treponema Abs w Reflex to RPR and Titer  - HIV Antigen Antibody Combo         Risks and Recommendations:  The patient has the following additional risks and recommendations for perioperative complications:   - No identified additional risk factors other than previously addressed    Medication Instructions:  Patient is to take all scheduled medications on the day of surgery    RECOMMENDATION:  APPROVAL GIVEN to proceed with proposed procedure, without further diagnostic evaluation.          Subjective     HPI related to upcoming procedure: revision of top surgrey    Has covid test on Friday.     Last T  injection was Wednesday.     Preop Questions 3/9/2022   1. Have you ever had a heart attack or stroke? No   2. Have you ever had surgery on your heart or blood vessels, such as a stent placement, a coronary artery bypass, or surgery on an artery in your head, neck, heart, or legs? No   3. Do you have chest pain with activity? No   4. Do you have a history of  heart failure? No   5. Do you currently have a cold, bronchitis or symptoms of other infection? No   6. Do you have a cough, shortness of breath, or wheezing? No   7. Do you or anyone in your family have previous history of blood clots? No   8. Do you or does anyone in your family have a serious bleeding problem such as prolonged bleeding following surgeries or cuts? No   9. Have you ever had problems with anemia or been told to take iron pills? YES -    10. Have you had any abnormal blood loss such as black, tarry or bloody stools, or abnormal vaginal bleeding? No   11. Have you ever had a blood transfusion? No   12. Are you willing to have a blood transfusion if it is medically needed before, during, or after your surgery? Yes   13. Have you or any of your relatives ever had problems with anesthesia? No   14. Do you have sleep apnea, excessive snoring or daytime drowsiness? No   15. Do you have any artifical heart valves or other implanted medical devices like a pacemaker, defibrillator, or continuous glucose monitor? No   16. Do you have artificial joints? No   17. Are you allergic to latex? No   18. Is there any chance that you may be pregnant? No       Health Care Directive:  Patient does not have a Health Care Directive or Living Will: Patient states has Advance Directive and will bring in a copy to clinic.    Preoperative Review of :   reviewed - no record of controlled substances prescribed.        Review of Systems  CONSTITUTIONAL: NEGATIVE for fever, chills, change in weight  INTEGUMENTARY/SKIN: NEGATIVE for worrisome rashes, moles or  lesions  EYES: NEGATIVE for vision changes or irritation  ENT/MOUTH: NEGATIVE for ear, mouth and throat problems  RESP: NEGATIVE for significant cough or SOB  CV: NEGATIVE for chest pain, palpitations or peripheral edema  GI: NEGATIVE for nausea, abdominal pain, heartburn, or change in bowel habits  : NEGATIVE for frequency, dysuria, or hematuria  MUSCULOSKELETAL: NEGATIVE for significant arthralgias or myalgia  NEURO: NEGATIVE for weakness, dizziness or paresthesias  ENDOCRINE: NEGATIVE for temperature intolerance, skin/hair changes  HEME: NEGATIVE for bleeding problems  PSYCHIATRIC: NEGATIVE for changes in mood or affect    Patient Active Problem List    Diagnosis Date Noted     Female-to-male transgender person 04/30/2021     Priority: Medium     Depersonalization-derealization disorder (H) 08/20/2018     Priority: Medium     PTSD (post-traumatic stress disorder) 07/12/2018     Priority: Medium     Major depressive disorder, recurrent episode, moderate (H) 07/12/2018     Priority: Medium      Past Medical History:   Diagnosis Date     Depressive disorder      Past Surgical History:   Procedure Laterality Date     BREAST SURGERY  2016    double mastectomy     ganglion cyst removal- right  01/2022     Current Outpatient Medications   Medication Sig Dispense Refill     levonorgestrel (MIRENA) 20 MCG/24HR IUD 1 each by Intrauterine route once       prazosin (MINIPRESS) 1 MG capsule Take 1 capsule (1 mg) by mouth At Bedtime 90 capsule 1     testosterone cypionate (DEPOTESTOSTERONE) 200 MG/ML injection Inject 0.2 mLs (40 mg) into the muscle once a week (Patient taking differently: Inject 40 mg Subcutaneous once a week ) 10 mL 0     venlafaxine (EFFEXOR-XR) 150 MG 24 hr capsule Take 1 capsule (150 mg) by mouth daily 90 capsule 1       Allergies   Allergen Reactions     Amoxicillin      hives        Social History     Tobacco Use     Smoking status: Never Smoker     Smokeless tobacco: Never Used   Substance Use Topics  "    Alcohol use: Never     Family History   Problem Relation Age of Onset     No Known Problems Mother      Depression Father      Anxiety Disorder Father      Hypertension Paternal Grandfather      Hyperlipidemia Paternal Grandfather      Other Cancer Paternal Grandfather         skin     Breast Cancer Paternal Grandmother      Depression Sister      Anxiety Disorder Sister      Thyroid Disease Sister      History   Drug Use Unknown         Objective     /80   Pulse 111   Temp 97.3  F (36.3  C) (Tympanic)   Ht 1.727 m (5' 8\")   Wt 96.8 kg (213 lb 6.4 oz)   SpO2 98%   BMI 32.45 kg/m      Physical Exam    GENERAL APPEARANCE: healthy, alert and no distress     EYES: EOMI,  PERRL     HENT: ear canals and TM's normal and nose and mouth without ulcers or lesions     NECK: no adenopathy, no asymmetry, masses, or scars and thyroid normal to palpation     RESP: lungs clear to auscultation - no rales, rhonchi or wheezes     CV: regular rates and rhythm, normal S1 S2, no S3 or S4 and no murmur, click or rub     MS: extremities normal- no gross deformities noted, no evidence of inflammation in joints, FROM in all extremities.     SKIN: no suspicious lesions or rashes     NEURO: Normal strength and tone, sensory exam grossly normal, mentation intact and speech normal     PSYCH: mentation appears normal. and affect normal/bright     LYMPHATICS: No cervical adenopathy    Recent Labs   Lab Test 05/07/21  1457   HGB 16.4*         POTASSIUM 4.0   CR 0.86        Diagnostics:  No labs were ordered during this visit.   No EKG required, no history of coronary heart disease, significant arrhythmia, peripheral arterial disease or other structural heart disease.    Revised Cardiac Risk Index (RCRI):  The patient has the following serious cardiovascular risks for perioperative complications:   - No serious cardiac risks = 0 points     RCRI Interpretation: 0 points: Class I (very low risk - 0.4% complication " rate)           Signed Electronically by: ELBA Jenkins CNP  Copy of this evaluation report is provided to requesting physician.

## 2022-03-15 LAB
ALBUMIN SERPL-MCNC: 4.1 G/DL (ref 3.4–5)
ALP SERPL-CCNC: 100 U/L (ref 40–150)
ALT SERPL W P-5'-P-CCNC: 51 U/L (ref 0–70)
ANION GAP SERPL CALCULATED.3IONS-SCNC: 8 MMOL/L (ref 3–14)
AST SERPL W P-5'-P-CCNC: 26 U/L (ref 0–45)
BILIRUB SERPL-MCNC: 0.8 MG/DL (ref 0.2–1.3)
BUN SERPL-MCNC: 12 MG/DL (ref 7–30)
CALCIUM SERPL-MCNC: 9.5 MG/DL (ref 8.5–10.1)
CHLORIDE BLD-SCNC: 106 MMOL/L (ref 94–109)
CO2 SERPL-SCNC: 25 MMOL/L (ref 20–32)
CREAT SERPL-MCNC: 0.87 MG/DL (ref 0.52–1.25)
GFR SERPL CREATININE-BSD FRML MDRD: >90 ML/MIN/1.73M2
GLUCOSE BLD-MCNC: 86 MG/DL (ref 70–99)
HIV 1+2 AB+HIV1 P24 AG SERPL QL IA: NONREACTIVE
POTASSIUM BLD-SCNC: 4 MMOL/L (ref 3.4–5.3)
PROT SERPL-MCNC: 7.5 G/DL (ref 6.8–8.8)
SODIUM SERPL-SCNC: 139 MMOL/L (ref 133–144)
T PALLIDUM AB SER QL: NONREACTIVE

## 2022-03-16 LAB — TESTOST SERPL-MCNC: 481 NG/DL (ref 8–950)

## 2022-03-18 ENCOUNTER — LAB (OUTPATIENT)
Dept: LAB | Facility: CLINIC | Age: 27
End: 2022-03-18
Attending: SURGERY
Payer: COMMERCIAL

## 2022-03-18 DIAGNOSIS — Z11.59 ENCOUNTER FOR SCREENING FOR OTHER VIRAL DISEASES: ICD-10-CM

## 2022-03-18 LAB — SARS-COV-2 RNA RESP QL NAA+PROBE: NEGATIVE

## 2022-03-18 PROCEDURE — U0005 INFEC AGEN DETEC AMPLI PROBE: HCPCS | Performed by: SURGERY

## 2022-03-21 ENCOUNTER — HOSPITAL ENCOUNTER (OUTPATIENT)
Facility: CLINIC | Age: 27
Discharge: HOME | End: 2022-03-21
Attending: SURGERY | Admitting: SURGERY
Payer: COMMERCIAL

## 2022-03-21 ENCOUNTER — ANESTHESIA (OUTPATIENT)
Dept: SURGERY | Facility: CLINIC | Age: 27
End: 2022-03-21
Payer: COMMERCIAL

## 2022-03-21 ENCOUNTER — ANESTHESIA EVENT (OUTPATIENT)
Dept: SURGERY | Facility: CLINIC | Age: 27
End: 2022-03-21
Payer: COMMERCIAL

## 2022-03-21 VITALS
RESPIRATION RATE: 12 BRPM | OXYGEN SATURATION: 96 % | DIASTOLIC BLOOD PRESSURE: 68 MMHG | HEIGHT: 68 IN | WEIGHT: 210.76 LBS | TEMPERATURE: 98.4 F | HEART RATE: 99 BPM | BODY MASS INDEX: 31.94 KG/M2 | SYSTOLIC BLOOD PRESSURE: 97 MMHG

## 2022-03-21 DIAGNOSIS — F64.0 GENDER DYSPHORIA IN ADOLESCENT AND ADULT: Primary | ICD-10-CM

## 2022-03-21 LAB — GLUCOSE BLDC GLUCOMTR-MCNC: 114 MG/DL (ref 70–99)

## 2022-03-21 PROCEDURE — 258N000003 HC RX IP 258 OP 636: Performed by: NURSE ANESTHETIST, CERTIFIED REGISTERED

## 2022-03-21 PROCEDURE — 360N000076 HC SURGERY LEVEL 3, PER MIN: Performed by: SURGERY

## 2022-03-21 PROCEDURE — 370N000017 HC ANESTHESIA TECHNICAL FEE, PER MIN: Performed by: SURGERY

## 2022-03-21 PROCEDURE — 96999 UNLISTED SPEC DERM SVC/PX: CPT | Mod: 50 | Performed by: SURGERY

## 2022-03-21 PROCEDURE — 250N000009 HC RX 250: Performed by: NURSE ANESTHETIST, CERTIFIED REGISTERED

## 2022-03-21 PROCEDURE — 250N000011 HC RX IP 250 OP 636: Performed by: SURGERY

## 2022-03-21 PROCEDURE — 250N000013 HC RX MED GY IP 250 OP 250 PS 637: Performed by: ANESTHESIOLOGY

## 2022-03-21 PROCEDURE — 250N000009 HC RX 250: Performed by: SURGERY

## 2022-03-21 PROCEDURE — 250N000011 HC RX IP 250 OP 636: Performed by: NURSE ANESTHETIST, CERTIFIED REGISTERED

## 2022-03-21 PROCEDURE — 710N000012 HC RECOVERY PHASE 2, PER MINUTE: Performed by: SURGERY

## 2022-03-21 PROCEDURE — 272N000001 HC OR GENERAL SUPPLY STERILE: Performed by: SURGERY

## 2022-03-21 PROCEDURE — 88305 TISSUE EXAM BY PATHOLOGIST: CPT | Mod: TC | Performed by: SURGERY

## 2022-03-21 PROCEDURE — 82962 GLUCOSE BLOOD TEST: CPT

## 2022-03-21 PROCEDURE — 250N000011 HC RX IP 250 OP 636: Performed by: ANESTHESIOLOGY

## 2022-03-21 PROCEDURE — 271N000002 HC RX 271: Performed by: SURGERY

## 2022-03-21 PROCEDURE — 999N000141 HC STATISTIC PRE-PROCEDURE NURSING ASSESSMENT: Performed by: SURGERY

## 2022-03-21 PROCEDURE — 272N000002 HC OR SUPPLY OTHER OPNP: Performed by: SURGERY

## 2022-03-21 PROCEDURE — 250N000025 HC SEVOFLURANE, PER MIN: Performed by: SURGERY

## 2022-03-21 PROCEDURE — 710N000010 HC RECOVERY PHASE 1, LEVEL 2, PER MIN: Performed by: SURGERY

## 2022-03-21 RX ORDER — FENTANYL CITRATE 50 UG/ML
INJECTION, SOLUTION INTRAMUSCULAR; INTRAVENOUS PRN
Status: DISCONTINUED | OUTPATIENT
Start: 2022-03-21 | End: 2022-03-21

## 2022-03-21 RX ORDER — CLINDAMYCIN PHOSPHATE 900 MG/50ML
900 INJECTION, SOLUTION INTRAVENOUS
Status: COMPLETED | OUTPATIENT
Start: 2022-03-21 | End: 2022-03-21

## 2022-03-21 RX ORDER — SODIUM CHLORIDE, SODIUM LACTATE, POTASSIUM CHLORIDE, CALCIUM CHLORIDE 600; 310; 30; 20 MG/100ML; MG/100ML; MG/100ML; MG/100ML
INJECTION, SOLUTION INTRAVENOUS CONTINUOUS PRN
Status: DISCONTINUED | OUTPATIENT
Start: 2022-03-21 | End: 2022-03-21

## 2022-03-21 RX ORDER — DEXMEDETOMIDINE HYDROCHLORIDE 4 UG/ML
INJECTION, SOLUTION INTRAVENOUS PRN
Status: DISCONTINUED | OUTPATIENT
Start: 2022-03-21 | End: 2022-03-21

## 2022-03-21 RX ORDER — EPHEDRINE SULFATE 50 MG/ML
INJECTION, SOLUTION INTRAMUSCULAR; INTRAVENOUS; SUBCUTANEOUS PRN
Status: DISCONTINUED | OUTPATIENT
Start: 2022-03-21 | End: 2022-03-21

## 2022-03-21 RX ORDER — PROPOFOL 10 MG/ML
INJECTION, EMULSION INTRAVENOUS CONTINUOUS PRN
Status: DISCONTINUED | OUTPATIENT
Start: 2022-03-21 | End: 2022-03-21

## 2022-03-21 RX ORDER — SODIUM CHLORIDE, SODIUM LACTATE, POTASSIUM CHLORIDE, CALCIUM CHLORIDE 600; 310; 30; 20 MG/100ML; MG/100ML; MG/100ML; MG/100ML
INJECTION, SOLUTION INTRAVENOUS CONTINUOUS
Status: DISCONTINUED | OUTPATIENT
Start: 2022-03-21 | End: 2022-03-21 | Stop reason: HOSPADM

## 2022-03-21 RX ORDER — BUPIVACAINE HYDROCHLORIDE 2.5 MG/ML
INJECTION, SOLUTION EPIDURAL; INFILTRATION; INTRACAUDAL PRN
Status: DISCONTINUED | OUTPATIENT
Start: 2022-03-21 | End: 2022-03-21 | Stop reason: HOSPADM

## 2022-03-21 RX ORDER — LIDOCAINE 40 MG/G
CREAM TOPICAL
Status: DISCONTINUED | OUTPATIENT
Start: 2022-03-21 | End: 2022-03-21 | Stop reason: HOSPADM

## 2022-03-21 RX ORDER — HYDROMORPHONE HCL IN WATER/PF 6 MG/30 ML
0.2 PATIENT CONTROLLED ANALGESIA SYRINGE INTRAVENOUS EVERY 5 MIN PRN
Status: DISCONTINUED | OUTPATIENT
Start: 2022-03-21 | End: 2022-03-21 | Stop reason: HOSPADM

## 2022-03-21 RX ORDER — FENTANYL CITRATE 50 UG/ML
25 INJECTION, SOLUTION INTRAMUSCULAR; INTRAVENOUS
Status: DISCONTINUED | OUTPATIENT
Start: 2022-03-21 | End: 2022-03-21 | Stop reason: HOSPADM

## 2022-03-21 RX ORDER — MEPERIDINE HYDROCHLORIDE 25 MG/ML
12.5 INJECTION INTRAMUSCULAR; INTRAVENOUS; SUBCUTANEOUS
Status: DISCONTINUED | OUTPATIENT
Start: 2022-03-21 | End: 2022-03-21 | Stop reason: HOSPADM

## 2022-03-21 RX ORDER — FENTANYL CITRATE 50 UG/ML
25 INJECTION, SOLUTION INTRAMUSCULAR; INTRAVENOUS EVERY 5 MIN PRN
Status: DISCONTINUED | OUTPATIENT
Start: 2022-03-21 | End: 2022-03-21 | Stop reason: HOSPADM

## 2022-03-21 RX ORDER — OXYCODONE HYDROCHLORIDE 5 MG/1
5 TABLET ORAL ONCE
Status: COMPLETED | OUTPATIENT
Start: 2022-03-21 | End: 2022-03-21

## 2022-03-21 RX ORDER — ONDANSETRON 2 MG/ML
INJECTION INTRAMUSCULAR; INTRAVENOUS PRN
Status: DISCONTINUED | OUTPATIENT
Start: 2022-03-21 | End: 2022-03-21

## 2022-03-21 RX ORDER — ONDANSETRON 2 MG/ML
4 INJECTION INTRAMUSCULAR; INTRAVENOUS EVERY 30 MIN PRN
Status: DISCONTINUED | OUTPATIENT
Start: 2022-03-21 | End: 2022-03-21 | Stop reason: HOSPADM

## 2022-03-21 RX ORDER — PROPOFOL 10 MG/ML
INJECTION, EMULSION INTRAVENOUS PRN
Status: DISCONTINUED | OUTPATIENT
Start: 2022-03-21 | End: 2022-03-21

## 2022-03-21 RX ORDER — HYDROXYZINE HYDROCHLORIDE 25 MG/1
25 TABLET, FILM COATED ORAL 3 TIMES DAILY PRN
Qty: 12 TABLET | Refills: 0 | Status: SHIPPED | OUTPATIENT
Start: 2022-03-21 | End: 2022-10-06

## 2022-03-21 RX ORDER — OXYCODONE HYDROCHLORIDE 5 MG/1
5 TABLET ORAL EVERY 6 HOURS PRN
Qty: 13 TABLET | Refills: 0 | Status: SHIPPED | OUTPATIENT
Start: 2022-03-21 | End: 2022-10-06

## 2022-03-21 RX ORDER — ONDANSETRON 4 MG/1
4 TABLET, ORALLY DISINTEGRATING ORAL EVERY 8 HOURS PRN
Qty: 12 TABLET | Refills: 0 | Status: SHIPPED | OUTPATIENT
Start: 2022-03-21 | End: 2022-10-06

## 2022-03-21 RX ORDER — ONDANSETRON 4 MG/1
4 TABLET, ORALLY DISINTEGRATING ORAL EVERY 30 MIN PRN
Status: DISCONTINUED | OUTPATIENT
Start: 2022-03-21 | End: 2022-03-21 | Stop reason: HOSPADM

## 2022-03-21 RX ORDER — DEXAMETHASONE SODIUM PHOSPHATE 4 MG/ML
INJECTION, SOLUTION INTRA-ARTICULAR; INTRALESIONAL; INTRAMUSCULAR; INTRAVENOUS; SOFT TISSUE PRN
Status: DISCONTINUED | OUTPATIENT
Start: 2022-03-21 | End: 2022-03-21

## 2022-03-21 RX ORDER — CLINDAMYCIN PHOSPHATE 900 MG/50ML
900 INJECTION, SOLUTION INTRAVENOUS SEE ADMIN INSTRUCTIONS
Status: DISCONTINUED | OUTPATIENT
Start: 2022-03-21 | End: 2022-03-21 | Stop reason: HOSPADM

## 2022-03-21 RX ORDER — AZITHROMYCIN 250 MG/1
TABLET, FILM COATED ORAL
Qty: 6 TABLET | Refills: 0 | Status: SHIPPED | OUTPATIENT
Start: 2022-03-21 | End: 2022-03-26

## 2022-03-21 RX ORDER — LIDOCAINE HYDROCHLORIDE 20 MG/ML
INJECTION, SOLUTION INFILTRATION; PERINEURAL PRN
Status: DISCONTINUED | OUTPATIENT
Start: 2022-03-21 | End: 2022-03-21

## 2022-03-21 RX ADMIN — CLINDAMYCIN PHOSPHATE 900 MG: 900 INJECTION, SOLUTION INTRAVENOUS at 09:46

## 2022-03-21 RX ADMIN — PHENYLEPHRINE HYDROCHLORIDE 0.3 MCG/KG/MIN: 10 INJECTION INTRAVENOUS at 10:05

## 2022-03-21 RX ADMIN — SODIUM CHLORIDE, POTASSIUM CHLORIDE, SODIUM LACTATE AND CALCIUM CHLORIDE: 600; 310; 30; 20 INJECTION, SOLUTION INTRAVENOUS at 10:11

## 2022-03-21 RX ADMIN — ONDANSETRON 4 MG: 2 INJECTION INTRAMUSCULAR; INTRAVENOUS at 15:05

## 2022-03-21 RX ADMIN — Medication 7.5 MG: at 10:05

## 2022-03-21 RX ADMIN — SUGAMMADEX 200 MG: 100 INJECTION, SOLUTION INTRAVENOUS at 13:25

## 2022-03-21 RX ADMIN — Medication: at 14:04

## 2022-03-21 RX ADMIN — ROCURONIUM BROMIDE 50 MG: 50 INJECTION, SOLUTION INTRAVENOUS at 09:34

## 2022-03-21 RX ADMIN — PHENYLEPHRINE HYDROCHLORIDE 100 MCG: 10 INJECTION INTRAVENOUS at 10:42

## 2022-03-21 RX ADMIN — SODIUM CHLORIDE, POTASSIUM CHLORIDE, SODIUM LACTATE AND CALCIUM CHLORIDE: 600; 310; 30; 20 INJECTION, SOLUTION INTRAVENOUS at 09:31

## 2022-03-21 RX ADMIN — DEXAMETHASONE SODIUM PHOSPHATE 4 MG: 4 INJECTION, SOLUTION INTRAMUSCULAR; INTRAVENOUS at 09:34

## 2022-03-21 RX ADMIN — ONDANSETRON 4 MG: 2 INJECTION INTRAMUSCULAR; INTRAVENOUS at 13:06

## 2022-03-21 RX ADMIN — DEXMEDETOMIDINE 8 MCG: 100 INJECTION, SOLUTION, CONCENTRATE INTRAVENOUS at 09:32

## 2022-03-21 RX ADMIN — PHENYLEPHRINE HYDROCHLORIDE 100 MCG: 10 INJECTION INTRAVENOUS at 10:40

## 2022-03-21 RX ADMIN — PHENYLEPHRINE HYDROCHLORIDE 100 MCG: 10 INJECTION INTRAVENOUS at 09:53

## 2022-03-21 RX ADMIN — HYDROMORPHONE HYDROCHLORIDE 0.2 MG: 0.2 INJECTION, SOLUTION INTRAMUSCULAR; INTRAVENOUS; SUBCUTANEOUS at 13:56

## 2022-03-21 RX ADMIN — FENTANYL CITRATE 100 MCG: 50 INJECTION, SOLUTION INTRAMUSCULAR; INTRAVENOUS at 09:34

## 2022-03-21 RX ADMIN — PHENYLEPHRINE HYDROCHLORIDE 100 MCG: 10 INJECTION INTRAVENOUS at 09:50

## 2022-03-21 RX ADMIN — DEXMEDETOMIDINE 12 MCG: 100 INJECTION, SOLUTION, CONCENTRATE INTRAVENOUS at 09:35

## 2022-03-21 RX ADMIN — OXYCODONE 5 MG: 5 TABLET ORAL at 14:30

## 2022-03-21 RX ADMIN — PROPOFOL 50 MCG/KG/MIN: 10 INJECTION, EMULSION INTRAVENOUS at 09:40

## 2022-03-21 RX ADMIN — PROPOFOL 50 MCG/KG/MIN: 10 INJECTION, EMULSION INTRAVENOUS at 10:13

## 2022-03-21 RX ADMIN — PROPOFOL 150 MG: 10 INJECTION, EMULSION INTRAVENOUS at 09:34

## 2022-03-21 RX ADMIN — LIDOCAINE HYDROCHLORIDE 20 MG: 20 INJECTION, SOLUTION INFILTRATION; PERINEURAL at 09:34

## 2022-03-21 RX ADMIN — HYDROMORPHONE HYDROCHLORIDE 0.5 MG: 1 INJECTION, SOLUTION INTRAMUSCULAR; INTRAVENOUS; SUBCUTANEOUS at 10:36

## 2022-03-21 RX ADMIN — PHENYLEPHRINE HYDROCHLORIDE 200 MCG: 10 INJECTION INTRAVENOUS at 10:05

## 2022-03-21 RX ADMIN — PHENYLEPHRINE HYDROCHLORIDE 200 MCG: 10 INJECTION INTRAVENOUS at 09:56

## 2022-03-21 ASSESSMENT — ENCOUNTER SYMPTOMS: APNEA: 0

## 2022-03-21 NOTE — BRIEF OP NOTE
[facility]    Brief Operative Note    Pre-operative diagnosis: Gender dysphoria in adolescent and adult [F64.0]  Post-operative diagnosis Same as pre-operative diagnosis    Procedure: Procedure(s):  REVISION MASTECTOMY, BILATERAL, SIMPLE WITH BILATERAL NIPPLE REGRAFTING - OnQ pump  Surgeon: Surgeon(s) and Role:     * Deandra Busch MD - Primary     * Radha Best PA-C - Assisting  Anesthesia: General   Estimated Blood Loss: 50    Drains: Reagan-Yeung  Specimens:   ID Type Source Tests Collected by Time Destination   1 : Left  Tissue Breast, Left SURGICAL PATHOLOGY EXAM Deandra Busch MD 3/21/2022 10:55 AM    2 : Right Tissue Breast, Right SURGICAL PATHOLOGY EXAM Deandra Busch MD 3/21/2022 10:55 AM    3 : Right- Remaining Breast tissue  Tissue Breast, Right SURGICAL PATHOLOGY EXAM Deandra Busch MD 3/21/2022 12:53 PM      Findings:   None.  Complications: None.  Implants: * No implants in log *  Bilateral revisions, nipple grafting. JPx2 and onQ

## 2022-03-21 NOTE — INTERVAL H&P NOTE
"I have reviewed the surgical (or preoperative) H&P that is linked to this encounter, and examined the patient. There are no significant changes    Clinical Conditions Present on Arrival:  SECTIONPRESENTONADMISSIONBEGIN@                   # Obesity: Estimated body mass index is 32.05 kg/m  as calculated from the following:    Height as of this encounter: 1.727 m (5' 7.99\").    Weight as of this encounter: 95.6 kg (210 lb 12.2 oz).       "

## 2022-03-21 NOTE — ANESTHESIA CARE TRANSFER NOTE
Patient: Syed Hutchinson    Procedure: Procedure(s):  REVISION MASTECTOMY, BILATERAL, SIMPLE WITH BILATERAL NIPPLE REGRAFTING - OnQ pump       Diagnosis: Gender dysphoria in adolescent and adult [F64.0]  Diagnosis Additional Information: No value filed.    Anesthesia Type:   General     Note:    Oropharynx: spontaneously breathing  Level of Consciousness: awake  Oxygen Supplementation: face mask    Independent Airway: airway patency satisfactory and stable  Dentition: dentition unchanged  Vital Signs Stable: post-procedure vital signs reviewed and stable  Report to RN Given: handoff report given  Patient transferred to: PACU    Handoff Report: Identifed the Patient, Identified the Reponsible Provider, Reviewed the pertinent medical history, Discussed the surgical course, Reviewed Intra-OP anesthesia mangement and issues during anesthesia, Set expectations for post-procedure period and Allowed opportunity for questions and acknowledgement of understanding      Vitals:  Vitals Value Taken Time   BP     Temp     Pulse 115 03/21/22 1339   Resp 18 03/21/22 1339   SpO2 93 % 03/21/22 1339   Vitals shown include unvalidated device data.    Electronically Signed By: ELBA Abebe CRNA  March 21, 2022  1:40 PM

## 2022-03-21 NOTE — ANESTHESIA PREPROCEDURE EVALUATION
"Anesthesia Pre-Procedure Evaluation    Patient: Syed Hutchinson   MRN:     [id number] Gender:   adult   Age:    26 year old :      1995        Procedure(s):  REVISION MASTECTOMY, BILATERAL, SIMPLE WITH BILATERAL NIPPLE REGRAFTING - OnQ pump     LABS:  CBC:   Lab Results   Component Value Date    WBC 7.3 2022    WBC 4.6 2021    HGB 16.6 2022    HGB 16.4 (H) 2021    HCT 49.2 2022    HCT 48.8 (H) 2021     2022     2021     BMP:   Lab Results   Component Value Date     2022     2021    POTASSIUM 4.0 2022    POTASSIUM 4.0 2021    CHLORIDE 106 2022    CHLORIDE 106 2021    CO2 25 2022    CO2 29 2021    BUN 12 2022    BUN 14 2021    CR 0.87 2022    CR 0.86 2021    GLC 86 2022    GLC 95 2021     COAGS: No results found for: PTT, INR, FIBR  POC: No results found for: BGM, HCG, HCGS  OTHER:   Lab Results   Component Value Date    XAVIER 9.5 2022    ALBUMIN 4.1 2022    PROTTOTAL 7.5 2022    ALT 51 2022    AST 26 2022    ALKPHOS 100 2022    BILITOTAL 0.8 2022        Preop Vitals    BP Readings from Last 3 Encounters:   22 120/80   22 121/79   22 108/66    Pulse Readings from Last 3 Encounters:   22 111   22 111   22 95      Resp Readings from Last 3 Encounters:   22 14   20 16    SpO2 Readings from Last 3 Encounters:   22 98%   22 97%   22 98%      Temp Readings from Last 1 Encounters:   22 36.3  C (97.3  F) (Tympanic)    Ht Readings from Last 1 Encounters:   22 1.727 m (5' 8\")      Wt Readings from Last 1 Encounters:   22 96.8 kg (213 lb 6.4 oz)    Estimated body mass index is 32.45 kg/m  as calculated from the following:    Height as of 3/14/22: 1.727 m (5' 8\").    Weight as of 3/14/22: 96.8 kg (213 lb 6.4 oz).     LDA:        Past Medical " History:   Diagnosis Date     Depressive disorder       Past Surgical History:   Procedure Laterality Date     BREAST SURGERY  2016    double mastectomy     ganglion cyst removal- right  01/2022      Allergies   Allergen Reactions     Amoxicillin      hives        Anesthesia Evaluation    ROS/Med Hx    No history of anesthetic complications  (-) malignant hyperthermia and tuberculosis  Comments: David is scheduled for a bilateral revision mastectomy with nipple regrafting.   This is part of the gender dysphoria cares.     Met with David who is NPO and denies problems with anesthesia -he prefers not to receive midazolam IV for personal preference.        Cardiovascular Findings   Comments: Denies history of heart disease    Neuro Findings   Comments: History of depression and is on meds    Pulmonary Findings   (-) asthma and apnea  Comments: Non-smoker    HENT Findings   Comments: Has jewelery on nose and ears and wants to leave it despite risks that were discussed.     Skin Findings   Comments: Denies acute  issues      GI/Hepatic/Renal Findings   (-) GERD    Endocrine/Metabolic Findings       Comments: Is on testosterone and also estrogen via uterus    Genetic/Syndrome Findings - negative genetics/syndromes ROS    Hematology/Oncology Findings - negative hematology/oncology ROS    Additional Notes  Allergies:   -- Amoxicillin     --  Hives    Medications Prior to Admission:  levonorgestrel (MIRENA) 20 MCG/24HR IUD, 1 each by Intrauterine route once, Disp: , Rfl:   prazosin (MINIPRESS) 1 MG capsule, Take 1 capsule (1 mg) by mouth At Bedtime, Disp: 90 capsule, Rfl: 1  testosterone cypionate (DEPOTESTOSTERONE) 200 MG/ML injection, Inject 0.2 mLs (40 mg) into the muscle once a week (Patient taking differently: Inject 40 mg Subcutaneous once a week ), Disp: 10 mL, Rfl: 0  venlafaxine (EFFEXOR-XR) 150 MG 24 hr capsule, Take 1 capsule (150 mg) by mouth daily, Disp: 90 capsule, Rfl: 1            PHYSICAL EXAM:   Mental  Status/Neuro: A/A/O   Airway: Facies: Feasible  Mallampati: I  Mouth/Opening: Full  TM distance: > 6 cm  Neck ROM: Full   Respiratory: Auscultation: CTAB     Resp. Rate: Normal     Resp. Effort: Normal      CV: Rhythm: Regular  Rate: Age appropriate  Heart: Normal Sounds  Edema: None   Comments: Dental: no acute issues                     Anesthesia Plan    ASA Status:  2   NPO Status:  NPO Appropriate    Anesthesia Type: General.     - Airway: ETT   Induction: Intravenous, Propofol.   Maintenance: Balanced.   Techniques and Equipment:     - Lines/Monitors: 2nd IV     Consents    Anesthesia Plan(s) and associated risks, benefits, and realistic alternatives discussed. Questions answered and patient/representative(s) expressed understanding.     - Discussed: Risks, Benefits and Alternatives for BOTH SEDATION and the PROCEDURE were discussed     - Discussed with:  Patient      - Extended Intubation/Ventilatory Support Discussed: No.      - Patient is DNR/DNI Status: No    Use of blood products discussed: No .     Postoperative Care    Pain management: IV analgesics, Multi-modal analgesia.   PONV prophylaxis: Ondansetron (or other 5HT-3), Dexamethasone or Solumedrol     Comments:    Other Comments: David requests anesthesia. Procedures and risks explained. David understood and consented.Qs answered.         Barney Torres MD

## 2022-03-21 NOTE — ANESTHESIA PROCEDURE NOTES
Airway         Procedure Start/Stop Times: 3/21/2022 9:39 AM  Staff -        Other Anesthesia Staff: Radha Dominguez       Performed By: SRNAIndications and Patient Condition       Indications for airway management: andrew-procedural       Induction type:intravenous       Mask difficulty assessment: 1 - vent by mask    Final Airway Details       Final airway type: endotracheal airway       Successful airway: ETT - single  Endotracheal Airway Details        ETT size (mm): 7.0       Cuffed: yes       Successful intubation technique: direct laryngoscopy       DL Blade Type: MAC 3       Grade View of Cords: 1       Adjucts: stylet       Position: Right       Measured from: gums/teeth       Secured at (cm): 22    Post intubation assessment        Placement verified by: capnometry, equal breath sounds and chest rise        Number of attempts at approach: 1       Secured with: pink tape       Ease of procedure: easy       Dentition: Intact and Unchanged

## 2022-03-21 NOTE — OR NURSING
Dr. Torres and Dr. Busch aware of pt's earrings x3 right ear, left ear x1, and nasal septum x1.  Pt states unable to remove without tools.  Dr. Busch ok with piercings into OR, Dr. Torres discussed risk of burns with pt; pt would prefer to leave piercings in.

## 2022-03-21 NOTE — DISCHARGE INSTRUCTIONS
ON-Q  Continuous Pain Pump Discharge Instructions after Bilateral Mastectomy with Dr. Busch    The OnQ pump:       Dr. Busch inserted a pain pump under your incisions.    The pump is shaped like a balloon and is filled with medicine that causes numbness or loss of sensation to help control your pain around the incision.  The pain pump DOES NOT contain controlled substances.      The medicine in the pump may alter your ability to feel changes in temperature or pressure.       The Pump:      The pump delivers medicine at a very slow rate.    You will NOT see the medicine moving through the tubing.    As the medicine is delivered, the pump ball will slowly become smaller.    It may take a day or so before you notice a change in the size and look of the pump.    It typically takes 5 days for all the medicine in the pump to deliver.    The middle part of the pump may look like an apple core when empty.    Managing Your Pain:    The Medicine and Infusion Rate: 0.2% Ropivacaine at 4mL/ hr       The pain pump may not block all of the pain from your surgery so it is important that you take the pain medicines prescribed by your surgeon if you need them.      If you continue to have difficulty with your pain control, please contact Dr. Busch's clinic.    Caring for Your Pump at Home:      The pump functions when the blue sensor tubing in contact with your skin. Your skin temperature keeps the valve open. Currently it is adhered with a clear dressing. Please reinforce this dressing as needed to ensure pump function.    Make sure there are no kinks in the tubing.    Do not tape or cover up the filter.    Protect the pump from sunlight and heat.    When sleeping:   o Do not place the pump underneath the bed covers where the pump may become too warm.  o Do not place the pump on the floor or hang the pump on a bed post as these situations may cause the tubing to get tangled and get pulled out.    Bathing/Showering:     o We recommend taking sponge baths until the pump is removed.  o It is important to not get the area where the tube enters your body wet.    Removing the Tubing:    Dr. Busch will take the pain pump tubes out at your follow up appointment. If you wish to remove the tubes yourself, follow these steps:    o Wash your hands.  o Remove the clear dressing that covers the tubing.  o Grasp the tubing close to the skin and gently pull.  If you meet resistance, stop pulling and call Dr. Busch's clinic  o DO NOT cut or forcefully remove the tubing.  o After removal, check the end of the tubing for a dark tip.  If you do not see a dark tip, call Dr. Busch's clinic.  o Apply firm pressure over the site until oozing stops.  Wash the area with soap and water, dry with a clean towel and then cover with a bandage.      The pump is not reusable. Dispose of it in the trash and wash your hands.     Troubleshooting:      Tubing Comes Out From Skin:  If the tube accidentally comes out, check the end of the tube for a dark tip.    If you see a dark tip simply discard it and do not attempt to reinsert the tube. You will also have to remove the tube on the other side.    If you don t see a dark tip, immediately call Dr. Busch's clinic.      Tubing Disconnection:  If the tubing accidentally becomes disconnected from the pump, DO NOT reconnect the pump to the tubing.  It may have been contaminated with germs.  Remove the tubes as described above and call Dr. Busch's clinic.      Fluid Leaking:  If enough fluid is leaking from the pump or the tubing outside your body to soak through the dressing, please contact Dr. Busch's clinic.    Immediately report the following to Dr. Busch's clinic:      Redness, warmth, swelling, or tenderness at the site the tubing was inserted    Increase in pain    Fever, chills, sweats    Bowel or bladder changes    Difficulty breathing    Dizziness, lightheadedness    Blurred vision    Ringing or  buzzing in your ears    Metal taste in your mouth    Numbness and/or tingling around your mouth, fingers or toes    Drowsiness    Confusion    Trouble removing the tubing    Dark tip is not present when tubing is removed       During daytime hours call Dr. Busch's plastic surgery clinic RN at [phone] or main office at [phone]     After hours and weekends: [phone] and ask for the Resident On Call for Plastic Surgeon       Caring for your Reagan-Yeung Drains after Bilateral Mastectomy with Dr. Narayan Busch placed Reagan-Yeung drainage tubes into your incisions. This tube drains fluid from your incision, helping prevent swelling and reducing the risk for infection. The tube is held in place by a few stitches. The drain will be removed at a follow up appointment when the amount of drainage decreases.     Home Care:    Make sure the tube does not pull. You may tape the tube to the skin below the bandage.    Secure the tube and bulb inside your clothing with a safety pin. This helps keep the tube from being pulled out.     Keep the bulb compressed at all times, except when you empty it.    Empty your drain at least twice a day. Empty it more often if the drain is full.   o Wash your hands  o Lift the opening of the drain.  o Drain the fluid into a measuring cup.  o Record the amount of fluid each time you empty in the chart below. Share the information with your doctor at your follow-up visit.   o Squeeze the bulb with your hands until you hear air coming out of the bulb.  o Close the opening.        Stripping  the tube helps keep blood clots from blocking the tube. Do this twice a day when you empty the drain:    o Hold the tubing where it leaves the skin with one hand. This keeps it from pulling on the skin.  o Pinch the tubing with the thumb and first finger of your other hand.   o Slowly and firmly pull your thumb and first finger down the tube (squeezing the tube between your fingers).  Keep squeezing the tube as you run your fingers towards the bulb. If the pulling hurts or feels like it is coming out of the skin, STOP. Begin again more gently.  o Let go of the tubing with both hands. If the tube is still blocked, repeat these steps three or four times. Make sure that the bulb is compressed so it creates suction.    When to call your doctor:    New or increased pain around the tube    Redness, warmth, or swelling around the incision or tube    Drainage that is foul smelling    Fever over 101 F degrees    Fluid leaking around the tube    Bulb won't hold suction    The tube falls out    A sudden amount of bright red drainage filling the bulb rapidly    A sudden decrease of fluid in bulb AND swelling with discomfort building up at site    Your drainage record:    Date Time Bulb 1: Amount of drainage (ml or cc) Bulb 2: Amount of drainage (ml or cc) Notes                                                                                            Rev. 4/2014    Same-Day Surgery   Adult Discharge Orders & Instructions     For 24 hours after surgery:  1. Get plenty of rest.  A responsible adult must stay with you for at least 24 hours after you leave the hospital.   2. Pain medication can slow your reflexes. Do not drive or use heavy equipment.  If you have weakness or tingling, don't drive or use heavy equipment until this feeling goes away.  3. Mixing alcohol and pain medication can cause dizziness and slow your breathing. It can even be fatal. Do not drink alcohol while taking pain medication.  4. Avoid strenuous or risky activities.  Ask for help when climbing stairs.   5. You may feel lightheaded.  If so, sit for a few minutes before standing.  Have someone help you get up.   6. If you have nausea (feel sick to your stomach), drink only clear liquids such as apple juice, ginger ale, broth or 7-Up.  Rest may also help.  Be sure to drink enough fluids.  Move to a regular diet as you feel able. Take pain  medications with a small amount of solid food, such as toast or crackers, to avoid nausea.   7. A slight fever is normal. Call the doctor if your fever is over 100 F (37.7 C) (taken under the tongue) or lasts longer than 24 hours.  8. You may have a dry mouth, muscle aches, trouble sleeping or a sore throat.  These symptoms should go away after 24 hours.  9. Do not make important or legal decisions.   Pain Management:      1. Take pain medication (if prescribed) for pain as directed by your physician.        2. WARNING: If the pain medication you have been prescribed contains Tylenol  (acetaminophen), DO NOT take additional doses of Tylenol (acetaminophen).     Call your doctor for any of the followin.  Signs of infection (fever, growing tenderness at the surgery site, severe pain, a large amount of drainage or bleeding, foul-smelling drainage, redness, swelling).    2.  It has been over 8 to 10 hours since surgery and you are still not able to urinate (pee).    3.  Headache for over 24 hours.    4.  Numbness, tingling or weakness the day after surgery (if you had spinal anesthesia).  To contact a doctor, call  Zaid Gleason's office at [phone] at the [facility]astic [facility] from 8 am till 5 pm   or:      [phone] and ask for the Resident On Call for:          Plastic Surgery   (answered 24 hours a day)      Emergency Department:  [facility]: [phone]  [facility]: [phone]               Rev. 10/2014

## 2022-03-21 NOTE — ANESTHESIA POSTPROCEDURE EVALUATION
Patient: Syed Hutchinson    Procedure: Procedure(s):  REVISION MASTECTOMY, BILATERAL, SIMPLE WITH BILATERAL NIPPLE REGRAFTING - OnQ pump       Anesthesia Type:  General    Note:  Disposition: Outpatient   Postop Pain Control: Uneventful            Sign Out: Well controlled pain   PONV: No   Neuro/Psych: Uneventful            Sign Out: Acceptable/Baseline neuro status   Airway/Respiratory: Uneventful            Sign Out: Acceptable/Baseline resp. status   CV/Hemodynamics: Uneventful            Sign Out: Acceptable CV status; No obvious hypovolemia; No obvious fluid overload   Other NRE: NONE   DID A NON-ROUTINE EVENT OCCUR? No    Event details/Postop Comments:  Awakening satisfactorily; strong; breathing well; comfortable; no complaints or complications. Oriented and communicating effectively.            Last vitals:  Vitals Value Taken Time   BP 97/58 03/21/22 1430   Temp 36.7  C (98  F) 03/21/22 1430   Pulse 105 03/21/22 1434   Resp 10 03/21/22 1434   SpO2 95 % 03/21/22 1435   Vitals shown include unvalidated device data.    Electronically Signed By: Barney Torres MD  March 21, 2022  3:14 PM

## 2022-03-25 ENCOUNTER — OFFICE VISIT (OUTPATIENT)
Dept: PLASTIC SURGERY | Facility: CLINIC | Age: 27
End: 2022-03-25
Payer: COMMERCIAL

## 2022-03-25 VITALS
OXYGEN SATURATION: 97 % | HEIGHT: 68 IN | TEMPERATURE: 98.6 F | WEIGHT: 210 LBS | DIASTOLIC BLOOD PRESSURE: 96 MMHG | SYSTOLIC BLOOD PRESSURE: 144 MMHG | HEART RATE: 86 BPM | BODY MASS INDEX: 31.83 KG/M2

## 2022-03-25 DIAGNOSIS — F64.0 GENDER DYSPHORIA IN ADOLESCENT AND ADULT: Primary | ICD-10-CM

## 2022-03-25 PROCEDURE — 99024 POSTOP FOLLOW-UP VISIT: CPT | Performed by: PHYSICIAN ASSISTANT

## 2022-03-25 ASSESSMENT — PAIN SCALES - GENERAL: PAINLEVEL: NO PAIN (0)

## 2022-03-25 NOTE — NURSING NOTE
"Chief Complaint   Patient presents with     RECHECK     David, is being seen today for a follow up DOS 3/21, On -Q leaking, as reporte dby patient.       Vitals:    03/25/22 1120   BP: (!) 144/96   BP Location: Left arm   Patient Position: Chair   Cuff Size: Adult Regular   Pulse: 86   Temp: 98.6  F (37  C)   TempSrc: Oral   SpO2: 97%   Weight: 95.3 kg (210 lb)   Height: 1.727 m (5' 7.99\")       Body mass index is 31.94 kg/m .      Jodee Schilling LPN    "

## 2022-03-25 NOTE — OP NOTE
Procedure Date: 03/21/2022    ATTENDING:  Deandra Busch MD    FIRST ASSISTANT:  Radha Best PA-C (no resident available, GAURAV did 50% of case).    PREOPERATIVE DIAGNOSIS:  Residual deformities following top surgery, including low incisions, displaced misshapen nipple grafts, and axillary fullness with dog-ear deformities laterally.    POSTOPERATIVE DIAGNOSIS:  Residual deformities following top surgery, including low incisions, displaced misshapen nipple grafts, and axillary fullness with dog-ear deformities laterally.    PROCEDURE:  Revision top surgery mastectomy with free grafting nipple grafts.    ANESTHESIA:  GETA.    ESTIMATED BLOOD LOSS:  50 mL.    INTRAVENOUS FLUIDS:  1000 mL.    URINE OUTPUT:  Not measured.    SPECIMENS:  Right breast 196 grams, left breast 175 grams.    INDICATIONS FOR PROCEDURE:  This is a 26-year-old biological female who transitioned to male.  They already had undergone gender-affirming top surgery with an outside surgeon and were unhappy with the results, including dog-ears, axillary fullness, inferiorly displaced IMF incisions, and low nipple graft placement.  They presented to our clinic, and we felt that we could help to improve their outcome by raising their IMF incisions, also to incorporate the lateral dog-ear excess or redundant tissue, also to try to debulk and redrape the anterior axillary breast fullness.  We would also regress their nipple grafts higher on their pectoral muscles.  Fortunately, the patient was able to obtain insurance prior authorization, so the patient was scheduled for [facility].  The patient did understand that there is always a possibility that things could turn out worse rather than better, including possible complete or partial loss of their nipple grafting.  This patient was having enough dysphoria that they were willing to take those possible risks and complications to achieve a more aesthetically acceptable chest appearance.    DESCRIPTION OF  PROCEDURE:  The patient was seen in the preoperative waiting area.  The operative sites were marked.  This included sternal notch, sternal midline, current inframammary fold scars with possible extension laterally for dog-ears, palpable level of the inferior pectoralis muscle on flexion; this was just above the nipple grafts, particularly on the left side.  We also marked the excessive fullness or lipodystrophy of the anterior axillary fold and melissa a vertical line through the nipple areolar complex in its current location.  Informed consent was obtained after reviewing possible risks and complications, including but not limited to the following:  Infection, bleeding, hematoma/seroma formation, poor healing, possible dehiscence, possible spitting sutures, possible partial or complete loss of nipple graft, possible skin necrosis or fat necrosis, possible injury to surrounding neurovascular musculoskeletal structures including intrathoracic and intraaxillary structures, possible asymmetries and residual deformities, possible altered sensation of the chest wall, including hypo or hypersensitivity, and possible anesthetic risks such as DVT, PE and cardiopulmonary arrest.  The patient was then brought to the operating room and placed supine on the OR table.  After general anesthesia was administered, the patient was oral endotracheally intubated.  Arms were secured to arm boards at 90 degrees using Ace wraps.  Forelegs were supported with pillows and secured with padded safety straps.  The patient already had sequential compression devices on the lower extremities prior to induction.  Lower Jay Hugger was placed.  The patient was then put into a sitting position, and further adjustments were made on the table.  Of note, the patient had commented that he had some scoliosis in the preoperative area.  This did alter our positioning just slightly.  Once we were happy with our symmetry, the patient was then prepped and  draped in the usual sterile fashion using ChloraPrep.  After timeout was taken and the proper patient and procedure were identified, we made our inframammary fold incisions along his previous scars.  We did kind of extend that more superiorly and posteriorly to flatten the lateral thorax and eliminate any dog-ears in that area.  At first, we made our upper incision where we had marked it at the level of the palpable pectoralis.  This skin was essentially lifted off of the underlying subcutaneous tissue, and the old nipple grafts were harvested sharply and kept on the back table, marked per side, wrapped in normal saline gauze.  That excess skin was then excised, leaving the subcutaneous tissue for filler if needed.  We temporarily skin stapled the medial aspect and then continued to work on the lateral dog-ear area.  This was also resected as marked, and we soon began to appreciate that we would need to actually go back under the flap, elevate that again, and also undermine and extend our release of the anterior axillary fold where they had lipodystrophy.  This was done in such a way as to not disrupt the subcutaneous tissue below our superior flap incision.  The undermining did help to flatten and redistribute the lipodystrophy.  We did feel that we needed to maintain the inferior IMF fatty tissue to act as filler, so ultimately we tried some 2-0 Vicryl sutures to kind of pull that up and bring in its continuity with the underside of the superior skin flap.  This actually gave a nice appearance as far as contour.  We certainly had better level of their incisions.  We also had accomplished a fair amount of debulking and redistribution of the anterior axillary lipodystrophy, so we had pretty good contour there without having a major crease or isolated bulging.  The lateral thoracic wall had a pretty good shape, and there was minimal dog-ear there anymore.  When we were happy with our revision, the dissection pockets  were irrigated with antibiotic saline solution.  Hemostasis was achieved with cautery.  Then #15 round STANTON drains were introduced through separate stab incisions laterally and secured with 3-0 nylon suture.  These were draped along the inferior pocket above the inferior subcutaneous fat.  On-Q catheters were introduced percutaneously from the epigastric region and draped along the superior pocket.  We then began our closure once again with those 2-0 Vicryl deep sutures in the parenchyma to bring together the inferior filler subcutaneous tissue with the FREDDY flap.  When we were happy with this contour, the skin was reapproximated using 3-0 Vicryl deep dermal buried sutures and 4-0 Vicryl running subcuticular suture.  Some additional 5-0 fast absorbing was used to tighten tidy up the skin closure.  Once this was closed, we then trimmed our STANTON drains and put them to bulb suction.  We then turned our attention to our nipple grafting.  With the patient in sitting position, we used templates that were approximately 1.5 cm in diameter to localize the most appealing and aesthetically pleasing position for the new nipple grafts.  These were marked and then ultimately deepithelialized with a 15-blade.  Hemostasis was achieved with direct pressure.  Our previous nipple grafts were thinned fairly aggressively on the back table to allow more effective graft take.  These were then minimally trimmed, since they were fairly small to begin with.  These were then anchored into position on the recipient sites using 5-0 fast absorbing and running cuticular suture.  The nipple was anchored superiorly and inferiorly to the base of the wound.  Pie-crusting was done with an 18-gauge needle.  Xeroform sheets with antibiotic-soaked cotton balls were used to bolster the nipple grafts with 2-0 silk tieovers and skin staples.  A Dermabond Prineo was applied to the incisions for seal.  ABD pads were used for drain sponges, and a couple Kerlix  rolls were unfurled across the anterior chest for padding before wrapping circumferentially with a double-long 6-inch Ace wrap.  The patient was then extubated and transferred to a stretcher and taken to the recovery room in satisfactory condition, having tolerated the procedure without difficulty or complication.     Of note, the minimal tissue that we did resect from both sides during our revision was weighed in the OR and then sent to Pathology.  We had 196 grams of tissue from the right side and 175 grams from the left side.    Deandra Busch MD        D: 2022   T: 2022   MT: MARJORIE    Name:     YAMILA SHAWDk  MRN:      [id number]        Account:        [id number]   :      1995           Procedure Date: 2022     Document: [id number]

## 2022-03-25 NOTE — LETTER
"3/25/2022       RE: Syed Hutchinson  [address]  [facility] [address]     Dear Colleague,    Thank you for referring your patient, Syed Hutchinson, to the [facility] [place] at [facility]. Please see a copy of my visit note below.    Plastic Surgery Outpatient Visit    ID: David Hutchinson is a 26 year old other s/p bilateral mastectomy with nipple grafts for gender dysphoria 3/21/2022 with Dr. Busch. OnQ pump leaking.     S: overall doing well, onQ pump has been leaking and getting dressings wet. Pain is manageable with tylenol. Drain output is ~40cc daily each side.     O:  BP (!) 144/96 (BP Location: Left arm, Patient Position: Chair, Cuff Size: Adult Regular)   Pulse 86   Temp 98.6  F (37  C) (Oral)   Ht 1.727 m (5' 7.99\")   Wt 95.3 kg (210 lb)   SpO2 97%   BMI 31.94 kg/m     General: NAD  Chest: bilateral chest incisions c/d/i with nipple bolsters intact. No significant swelling. Mild residual ecchymosis.     A/P:  -onQ pump removed  -re-wrapped  -drains to remain until meeting criteria  -RTC next week for drain and nipple bolster removal    Radha Best PA-C  Plastic and Reconstructive Surgery    10 minutes spent on the date of the encounter doing chart review, history and physical, dressing changes, documentation and further activity as noted above.  "

## 2022-03-25 NOTE — PROGRESS NOTES
"Plastic Surgery Outpatient Visit    ID: David Hutchinson is a 26 year old other s/p bilateral mastectomy with nipple grafts for gender dysphoria 3/21/2022 with Dr. Busch. OnQ pump leaking.     S: overall doing well, onQ pump has been leaking and getting dressings wet. Pain is manageable with tylenol. Drain output is ~40cc daily each side.     O:  BP (!) 144/96 (BP Location: Left arm, Patient Position: Chair, Cuff Size: Adult Regular)   Pulse 86   Temp 98.6  F (37  C) (Oral)   Ht 1.727 m (5' 7.99\")   Wt 95.3 kg (210 lb)   SpO2 97%   BMI 31.94 kg/m     General: NAD  Chest: bilateral chest incisions c/d/i with nipple bolsters intact. No significant swelling. Mild residual ecchymosis.     A/P:  -onQ pump removed  -re-wrapped  -drains to remain until meeting criteria  -RTC next week for drain and nipple bolster removal    Radha Best PA-C  Plastic and Reconstructive Surgery    10 minutes spent on the date of the encounter doing chart review, history and physical, dressing changes, documentation and further activity as noted above.      "

## 2022-03-26 NOTE — PROGRESS NOTES
"PLASTICS PRE-OP  This is a 26 year old biological female who identifies as trans and presents for their pre-op visit prior to bilateral mastectomy revision with possible nipple re-grafting scheduled for 3/21. Their original top surgery was done in [place] and they were left with low incisions, residual lateral dogears, low and oblique nipple placement and prominent anterior axillary lipodystrophy. David is now thinking they may want to keep their nipple grafts and can always use tattooing for any necessary touchups or nipple healing problems. History and physical is scheduled for next week. COVID test is scheduled for next Friday.     David had several questions about the availability of \"surgical trauma reduction anesthesia\" so I referred them to our anesthesia team on the day of surgery.   They also were interested in having OnQ pump if there is a need for STANTON drains depending on the extent of mastectomy revision. They mentioned that they had had some spitting sutures during their last recovery.     They are prepared to take at least 1 month off from work and have their partner and roommates (previous patients or ours) available to help with postop cares.     Our nursing clinic staff discussed periop instructions with the patient including: not eating anything 8 hours prior to surgery, drinking clear liquids up to 2 hours before surgery except for morning medications with a sip of water, the preop shower with surgical soap which was given, and wearing a button- or zip-up shirt on the day of surgery. The patient was also instructed to avoid NSAIDs x 1 week both before AND after surgery, but he may take Tylenol post-op for pain as needed. The patient was provided with a folder of information on andrew-op topics, including where the surgery will be.     I discussed the following with the patient; preop, intraop and postop phases of care on the day of surgery, the placement of a bladder catheter during surgery that will " likely be removed in recovery, postop cares and limitations with relation to home and work settings, 5-lb weight restriction for the first 3 weeks postop, and how long to maintain limited activities. We also discussed Zofran, oxycodone, Z-jessica, and antipruritics which will be prescribed. We discussed that preventing constipation will be their responsibility, and we discussed methods such as aloe, prune juice or Miralax.     In addition, we went over the possible risks and complications involved with this elective procedure. These include but are not limited to: infection, bleeding, hematoma/seroma formation, and poor healing (including dehiscence, nipple graft loss, or hypertrophic scarring). We also discussed the possibility of altered chest sensation (either hypo or hypersensitive), residual deformities and asymmetries, possible further surgical revisions, and possible injury to surrounding neurovascular and musculoskeletal structures, including intra-axillary or intra-thoracic. We lastly discussed anesthetic risks including DVT/PE or cardiopulmonary events.      All questions were answered. David will bring any other questions that arise to the day of surgery.    Total time = 20 minutes, spent on the date of encounter doing chart review, history and physical, dressing changes, documentation, patient education, and any further activity as noted above.

## 2022-03-28 ENCOUNTER — TELEPHONE (OUTPATIENT)
Dept: PLASTIC SURGERY | Facility: CLINIC | Age: 27
End: 2022-03-28
Payer: COMMERCIAL

## 2022-03-28 NOTE — TELEPHONE ENCOUNTER
LM for patient in regards to appointment to be scheduled with Radha instead of  on 3/29.    Also, reminded patient to bring in paperwork they wanted filled out for work.    Call back number was provided.

## 2022-03-29 ENCOUNTER — OFFICE VISIT (OUTPATIENT)
Dept: PLASTIC SURGERY | Facility: CLINIC | Age: 27
End: 2022-03-29
Payer: COMMERCIAL

## 2022-03-29 VITALS
HEART RATE: 118 BPM | HEIGHT: 68 IN | DIASTOLIC BLOOD PRESSURE: 89 MMHG | WEIGHT: 209 LBS | BODY MASS INDEX: 31.67 KG/M2 | TEMPERATURE: 98.5 F | OXYGEN SATURATION: 96 % | SYSTOLIC BLOOD PRESSURE: 126 MMHG

## 2022-03-29 DIAGNOSIS — Z90.13 S/P BILATERAL MASTECTOMY: Primary | ICD-10-CM

## 2022-03-29 LAB
PATH REPORT.COMMENTS IMP SPEC: NORMAL
PATH REPORT.COMMENTS IMP SPEC: NORMAL
PATH REPORT.FINAL DX SPEC: NORMAL
PATH REPORT.GROSS SPEC: NORMAL
PATH REPORT.MICROSCOPIC SPEC OTHER STN: NORMAL
PATH REPORT.RELEVANT HX SPEC: NORMAL
PHOTO IMAGE: NORMAL

## 2022-03-29 PROCEDURE — 88305 TISSUE EXAM BY PATHOLOGIST: CPT | Mod: 26 | Performed by: PATHOLOGY

## 2022-03-29 PROCEDURE — 99024 POSTOP FOLLOW-UP VISIT: CPT | Performed by: SURGERY

## 2022-03-29 ASSESSMENT — PAIN SCALES - GENERAL: PAINLEVEL: MILD PAIN (2)

## 2022-03-29 NOTE — PATIENT INSTRUCTIONS
Care of Nipples and Chest Incisions    Change nipple dressings daily. Shower with nipple dressings in place for 1 week. Best to change the dressing after you shower. No direct shower spray on nipple grafts for 4 weeks from your surgery date.     Cut vaseline gauze to nipple size and place over nipple. Place folded white gauze over vaseline gauze and cover with plastic dressing. Do dressing changes for 1 more week from today. If you continue to have drainage, continue the dressings until drainage stops.     Keep compression on for 1 more week from today. Continue modified T-Diogo arms for 2 more weeks. At 3 weeks post op you may begin to use your arms as you normally would. Remove the tape from your incisions by 3 weeks post op.You may start moisturizing your incisions with any non-scented, non-glittered lotion 3 weeks from your surgery date. You can start scar care after the tape is removed. Any over the counter scar care is ok, we recommend silicone strips or sheets. These can be purchased on [employer] and at "[employer].     At one month post op, baseline shoulder motion should return. Full shoulder motion should return by 8 weeks.      When to call:  Sudden increase of swelling or pain on one side  Uncontrolled pain despite pain medication  Worsening of chest swelling   Separation of nipple from chest skin  Redness and warmth in chest area  Fever > 101     Contact the RN between 8-3:30 Mon-Fri with questions or concerns through my chart message via your doctor's name (most efficient) or call at [phone].   For urgent medical issues that cannot wait, call [phone] Mon-Fri 8:-4:30.     After hours, weekends or holidays, call [phone] and ask to speak to the on call plastic surgeon fellow.

## 2022-03-29 NOTE — PROGRESS NOTES
PLASTICS POST-OP   This is a 26 year old non-binary person with a history of gender dysphoria who presents 8 days post-op after a bilateral simple mastectomy with nipple graft reconstruction on 03/21/2022. Patient describes their pain as minimal. OnQ was taken out earlier (last Friday) due to leakage, but they mentioned that the OnQ was helpful. They felt they had enough narcotics provided.     PE: Chest: good contour and symmetry. Minimal dogears laterally. Incisions and nipple grafts in good anatomical position.   Nipple grafts 100% take, appears a bit swollen on L vs hematoma. Overall nice result. Photo taken with verbal consent.    A&P: 26 year old non-binary person who presents 8 days post-op after a bilateral simple mastectomy with nipple grafts on 03/21/2022.     As STANTON drain output was within normal limits, STANTON drains removed without difficulty. We discussed when to remove the Dermabond on the incision. He was given supplies for nipple graft dressings while they mature during the next week. Continue using ACE wrap x 1 week.     I reviewed with the patient how long to maintain limited activities. We discussed scar reducing techniques, such as Mederma, silicone strips, vitamin E oil, emu oil, massage and when they may begin using these. Problems to look out for during the recovery period were discussed, including: spitting sutures, incision dehiscence, hematoma/seroma, thick scarring, fluid accumulation under skin flap, delayed nipple graft healing.     He will follow up with me in 1 month. Causes for returning sooner were discussed.     Total time = 10 minutes, spent on the date of encounter doing chart review, history and physical, dressing changes, documentation, patient education, and any further activity as noted above.     This note was prepared on behalf of Deandra Busch MD by Naty Salmon, a trained medical scribe, based on my observations and the provider's statements to me.

## 2022-03-29 NOTE — NURSING NOTE
"Chief Complaint   Patient presents with     SRIKANTH Hernandez, is being seen today for a 1 week post-op DOS 3/21/22, no concerns at this time, as reported by patient.       Vitals:    03/29/22 1355   BP: 126/89   BP Location: Left arm   Patient Position: Chair   Cuff Size: Adult Regular   Pulse: 118   Temp: 98.5  F (36.9  C)   TempSrc: Oral   SpO2: 96%   Weight: 94.8 kg (209 lb)   Height: 1.727 m (5' 7.99\")       Body mass index is 31.79 kg/m .      Jodee Schilling LPN    "

## 2022-03-29 NOTE — LETTER
3/29/2022       RE: Syed Hutchinson  [address]  [facility] [address]     Dear Colleague,    Thank you for referring your patient, Syed Hutchinson, to the [facility] PLASTIC AND RECONSTRUCTIVE SURGERY CLINIC [place] at [facility]. Please see a copy of my visit note below.    PLASTICS POST-OP   This is a 26 year old non-binary person with a history of gender dysphoria who presents 8 days post-op after a bilateral simple mastectomy with nipple graft reconstruction on 03/21/2022. Patient describes their pain as minimal. OnQ was taken out earlier (last Friday) due to leakage, but they mentioned that the OnQ was helpful. They felt they had enough narcotics provided.     PE: Chest: good contour and symmetry. Minimal dogears laterally. Incisions and nipple grafts in good anatomical position.   Nipple grafts 100% take, appears a bit swollen on L vs hematoma. Overall nice result. Photo taken with verbal consent.    A&P: 26 year old non-binary person who presents 8 days post-op after a bilateral simple mastectomy with nipple grafts on 03/21/2022.     As STANTON drain output was within normal limits, STANTON drains removed without difficulty. We discussed when to remove the Dermabond on the incision. He was given supplies for nipple graft dressings while they mature during the next week. Continue using ACE wrap x 1 week.     I reviewed with the patient how long to maintain limited activities. We discussed scar reducing techniques, such as Mederma, silicone strips, vitamin E oil, emu oil, massage and when they may begin using these. Problems to look out for during the recovery period were discussed, including: spitting sutures, incision dehiscence, hematoma/seroma, thick scarring, fluid accumulation under skin flap, delayed nipple graft healing.     He will follow up with me in 1 month. Causes for returning sooner were discussed.     Total time = 10 minutes, spent on the date  of encounter doing chart review, history and physical, dressing changes, documentation, patient education, and any further activity as noted above.     This note was prepared on behalf of Deandra Busch MD by Naty Salmon, a trained medical scribe, based on my observations and the provider's statements to me.       Deandra Busch MD

## 2022-03-31 ENCOUNTER — DOCUMENTATION ONLY (OUTPATIENT)
Dept: OTHER | Facility: CLINIC | Age: 27
End: 2022-03-31
Payer: COMMERCIAL

## 2022-04-16 ENCOUNTER — MYC REFILL (OUTPATIENT)
Dept: PEDIATRICS | Facility: CLINIC | Age: 27
End: 2022-04-16
Payer: COMMERCIAL

## 2022-04-16 DIAGNOSIS — Z78.9 FEMALE-TO-MALE TRANSGENDER PERSON: ICD-10-CM

## 2022-04-18 RX ORDER — TESTOSTERONE CYPIONATE 200 MG/ML
40 INJECTION, SOLUTION INTRAMUSCULAR WEEKLY
Qty: 10 ML | Refills: 0 | Status: SHIPPED | OUTPATIENT
Start: 2022-04-18 | End: 2022-10-04

## 2022-04-18 NOTE — TELEPHONE ENCOUNTER
arlen beaulieu follow-up re: HRT at next avail, VRT ok. Offer to arlen ftf appointment for pap smear.

## 2022-04-18 NOTE — TELEPHONE ENCOUNTER
Routing refill request to provider for review/approval because:   Lipid panel on file in past 12 mos    Refills for this classification require provider review     Aida RAE RN

## 2022-04-19 ENCOUNTER — TELEPHONE (OUTPATIENT)
Dept: PEDIATRICS | Facility: CLINIC | Age: 27
End: 2022-04-19
Payer: COMMERCIAL

## 2022-04-19 NOTE — TELEPHONE ENCOUNTER
Prior Authorization Retail Medication Request    Medication/Dose: Testosterone 200mg/ml  ICD code (if different than what is on RX):    Previously Tried and Failed:  none  Rationale:  Has been on med for aa long time    Insurance Name:    Coverage information:     Subscriber: [id number] YAMILA SHAW     Rel to sub: 01 - Self     Member ID: [id number]     Payor: 10-PREFERREDONE Ph: [phone]     Benefit plan: [id number]-[id number] PREFERREDONE Ph: [phone]     Group number: [id number]     Member effective dates: from 01/01/21              Pharmacy Information (if different than what is on RX)  Name:  [facility]  Phone: [phone]

## 2022-04-23 NOTE — TELEPHONE ENCOUNTER
PA Initiation    Medication: testosterone cypionate (DEPOTESTOSTERONE) 200 MG/ML injection   Insurance Company: [employer] - Phone [phone] Fax [phone]  Pharmacy Filling the Rx: [id number] [place], [place] - [address] AT [facility] & [address] [place] [address]  Filling Pharmacy Phone: [phone]  Filling Pharmacy Fax: [phone]  Start Date: 4/23/2022

## 2022-04-26 NOTE — TELEPHONE ENCOUNTER
Prior Authorization Approval    Authorization Effective Date: 4/23/2022  Authorization Expiration Date: 4/23/2023  Medication: testosterone cypionate (DEPOTESTOSTERONE) 200 MG/ML injection--APPROVED  Approved Dose/Quantity:   Reference #:     Insurance Company: [employer] - Phone [phone] Fax [phone]  Expected CoPay:       CoPay Card Available:      Foundation Assistance Needed:    Which Pharmacy is filling the prescription (Not needed for infusion/clinic administered): [id number] - [address] AT [address]  Pharmacy Notified: Yes  Patient Notified: Yes **Instructed pharmacy to notify patient when script is ready to /ship.**

## 2022-05-03 ENCOUNTER — OFFICE VISIT (OUTPATIENT)
Dept: PLASTIC SURGERY | Facility: CLINIC | Age: 27
End: 2022-05-03
Payer: COMMERCIAL

## 2022-05-03 VITALS
HEIGHT: 68 IN | SYSTOLIC BLOOD PRESSURE: 125 MMHG | DIASTOLIC BLOOD PRESSURE: 80 MMHG | OXYGEN SATURATION: 96 % | BODY MASS INDEX: 31.67 KG/M2 | WEIGHT: 209 LBS | HEART RATE: 76 BPM

## 2022-05-03 DIAGNOSIS — Z98.890 POSTOPERATIVE STATE: ICD-10-CM

## 2022-05-03 DIAGNOSIS — Z90.13 S/P BILATERAL MASTECTOMY: Primary | ICD-10-CM

## 2022-05-03 PROCEDURE — 99024 POSTOP FOLLOW-UP VISIT: CPT | Performed by: SURGERY

## 2022-05-03 ASSESSMENT — PAIN SCALES - GENERAL: PAINLEVEL: NO PAIN (0)

## 2022-05-03 NOTE — PROGRESS NOTES
PLASTICS POST-OP  This is a 27 year old non-binary person with a history of gender dysphoria who presents 6 weeks post-op after a bilateral simple mastectomy with nipple graft reconstruction REVISION on 03/21/2022. They are here today with their partner. Patient has no complaints except for a minor difference in fullness (L>R). They haven't started weight lifting yet, bu they plan to start soon after their 6 week jacinta. Patient states that they walk around shirtless in the house. They state that scars are ropey. They are planning to massage and use  silicone strips. ROM is 80% with caution. They are very happy with how they look. Minimal suture spitting compared to first mastectomy in [place].     PE: Chest: Nice upper chest contour.   Good symmetry.   Incisions do not touch at midline.  Mild hypertrophy along incisions.  Mild L lateral fullness.  NG's hypopigmented around edges. Some actual nipple prominence.  Small dog ears posteriorly (R>L).   Photos taken with verbal consent.     A&P: 27 year old non-binary person who presents 6 weeks post-op after a bilateral simple mastectomy with nipple graft conservation REVISION on 03/21/2022.     Overall David is healing well. They can gradually increase activity as tolerated. OK to ease into working out again.    We discussed scar reducing techniques, such as Mederma, silicone strips, vitamin E oil, emu oil, massage and when he may begin using these.     They will follow up 6-12 months post-op. Causes for returning sooner were discussed.     Total time = 10 minutes, spent on the date of encounter doing chart review, history and physical, dressing changes, documentation, patient education, and any further activity as noted above.     This note was prepared on behalf of Deandra Busch MD by Naty Salmon, a trained medical scribe, based on my observations and the provider's statements to me.

## 2022-05-03 NOTE — LETTER
5/3/2022       RE: Syed Hutchinson  [address]  [place] [place] [address]     Dear Colleague,    Thank you for referring your patient, Syed Hutchinson, to the [facility] PLASTIC AND RECONSTRUCTIVE SURGERY CLINIC [place] at [facility]. Please see a copy of my visit note below.    PLASTICS POST-OP  This is a 27 year old non-binary person with a history of gender dysphoria who presents 6 weeks post-op after a bilateral simple mastectomy with nipple graft reconstruction REVISION on 03/21/2022. They are here today with their partner. Patient has no complaints except for a minor difference in fullness (L>R). They haven't started weight lifting yet, bu they plan to start soon after their 6 week jacinta. Patient states that they walk around shirtless in the house. They state that scars are ropey. They are planning to massage and use  silicone strips. ROM is 80% with caution. They are very happy with how they look. Minimal suture spitting compared to first mastectomy in [place].     PE: Chest: Nice upper chest contour.   Good symmetry.   Incisions do not touch at midline.  Mild hypertrophy along incisions.  Mild L lateral fullness.  NG's hypopigmented around edges. Some actual nipple prominence.  Small dog ears posteriorly (R>L).   Photos taken with verbal consent.     A&P: 27 year old non-binary person who presents 6 weeks post-op after a bilateral simple mastectomy with nipple graft conservation REVISION on 03/21/2022.     Overall David is healing well. They can gradually increase activity as tolerated. OK to ease into working out again.    We discussed scar reducing techniques, such as Mederma, silicone strips, vitamin E oil, emu oil, massage and when he may begin using these.     They will follow up 6-12 months post-op. Causes for returning sooner were discussed.     Total time = 10 minutes, spent on the date of encounter doing chart review, history and physical, dressing  changes, documentation, patient education, and any further activity as noted above.     This note was prepared on behalf of Deandra Busch MD by Naty Salmon, a trained medical scribe, based on my observations and the provider's statements to me.       Sincerely,    Deandra Busch MD

## 2022-05-03 NOTE — NURSING NOTE
"Chief Complaint   Patient presents with     RECHECK     David, is being seen today for a 6 week post-op DOS 3/21, no concerns at this time, as reported by patient.       Vitals:    05/03/22 1205   BP: 125/80   BP Location: Left arm   Patient Position: Chair   Cuff Size: Adult Large   Pulse: 76   SpO2: 96%   Weight: 94.8 kg (209 lb)   Height: 1.727 m (5' 7.99\")       Body mass index is 31.79 kg/m .      Jodee Schilling LPN    "

## 2022-05-09 ENCOUNTER — MYC MEDICAL ADVICE (OUTPATIENT)
Dept: PEDIATRICS | Facility: CLINIC | Age: 27
End: 2022-05-09
Payer: COMMERCIAL

## 2022-05-11 NOTE — TELEPHONE ENCOUNTER
Letter revised and mailed to Pt as requested in [employer] message dated 05/09/2022.  Carine KUMARI, CATHY,JORGE

## 2022-07-25 DIAGNOSIS — F33.1 MAJOR DEPRESSIVE DISORDER, RECURRENT EPISODE, MODERATE (H): ICD-10-CM

## 2022-07-25 RX ORDER — VENLAFAXINE HYDROCHLORIDE 150 MG/1
150 CAPSULE, EXTENDED RELEASE ORAL DAILY
Qty: 90 CAPSULE | Refills: 1 | Status: SHIPPED | OUTPATIENT
Start: 2022-07-25 | End: 2023-02-03

## 2022-08-01 ENCOUNTER — E-VISIT (OUTPATIENT)
Dept: PEDIATRICS | Facility: CLINIC | Age: 27
End: 2022-08-01
Payer: COMMERCIAL

## 2022-08-01 DIAGNOSIS — Z20.6 EXPOSURE TO HUMAN IMMUNODEFICIENCY VIRUS: Primary | ICD-10-CM

## 2022-08-01 PROCEDURE — 99421 OL DIG E/M SVC 5-10 MIN: CPT | Performed by: NURSE PRACTITIONER

## 2022-08-02 RX ORDER — EMTRICITABINE AND TENOFOVIR DISOPROXIL FUMARATE 200; 300 MG/1; MG/1
1 TABLET, FILM COATED ORAL DAILY
Qty: 90 TABLET | Refills: 0 | Status: CANCELLED | OUTPATIENT
Start: 2022-08-02

## 2022-08-06 ENCOUNTER — LAB (OUTPATIENT)
Dept: LAB | Facility: CLINIC | Age: 27
End: 2022-08-06
Payer: COMMERCIAL

## 2022-08-06 DIAGNOSIS — Z20.6 EXPOSURE TO HUMAN IMMUNODEFICIENCY VIRUS: ICD-10-CM

## 2022-08-06 PROCEDURE — 86780 TREPONEMA PALLIDUM: CPT

## 2022-08-06 PROCEDURE — 87389 HIV-1 AG W/HIV-1&-2 AB AG IA: CPT

## 2022-08-06 PROCEDURE — 87491 CHLMYD TRACH DNA AMP PROBE: CPT

## 2022-08-06 PROCEDURE — 87591 N.GONORRHOEAE DNA AMP PROB: CPT

## 2022-08-06 PROCEDURE — 80053 COMPREHEN METABOLIC PANEL: CPT

## 2022-08-06 PROCEDURE — 36415 COLL VENOUS BLD VENIPUNCTURE: CPT

## 2022-08-07 LAB
ALBUMIN SERPL-MCNC: 4.3 G/DL (ref 3.4–5)
ALP SERPL-CCNC: 131 U/L (ref 40–150)
ALT SERPL W P-5'-P-CCNC: 33 U/L (ref 0–70)
ANION GAP SERPL CALCULATED.3IONS-SCNC: 7 MMOL/L (ref 3–14)
AST SERPL W P-5'-P-CCNC: 24 U/L (ref 0–45)
BILIRUB SERPL-MCNC: 0.8 MG/DL (ref 0.2–1.3)
BUN SERPL-MCNC: 11 MG/DL (ref 7–30)
CALCIUM SERPL-MCNC: 9.5 MG/DL (ref 8.5–10.1)
CHLORIDE BLD-SCNC: 106 MMOL/L (ref 94–109)
CO2 SERPL-SCNC: 26 MMOL/L (ref 20–32)
CREAT SERPL-MCNC: 0.86 MG/DL (ref 0.52–1.25)
GFR SERPL CREATININE-BSD FRML MDRD: >90 ML/MIN/1.73M2
GLUCOSE BLD-MCNC: 95 MG/DL (ref 70–99)
POTASSIUM BLD-SCNC: 3.8 MMOL/L (ref 3.4–5.3)
PROT SERPL-MCNC: 7.6 G/DL (ref 6.8–8.8)
SODIUM SERPL-SCNC: 139 MMOL/L (ref 133–144)

## 2022-08-08 LAB
C TRACH DNA SPEC QL NAA+PROBE: NEGATIVE
HIV 1+2 AB+HIV1 P24 AG SERPL QL IA: NONREACTIVE
N GONORRHOEA DNA SPEC QL NAA+PROBE: NEGATIVE
T PALLIDUM AB SER QL: NONREACTIVE

## 2022-08-12 ENCOUNTER — MYC MEDICAL ADVICE (OUTPATIENT)
Dept: PEDIATRICS | Facility: CLINIC | Age: 27
End: 2022-08-12

## 2022-08-12 RX ORDER — EMTRICITABINE AND TENOFOVIR DISOPROXIL FUMARATE 200; 300 MG/1; MG/1
1 TABLET, FILM COATED ORAL DAILY
Qty: 90 TABLET | Refills: 0 | Status: SHIPPED | OUTPATIENT
Start: 2022-08-12 | End: 2023-04-20

## 2022-08-23 ENCOUNTER — MYC MEDICAL ADVICE (OUTPATIENT)
Dept: PLASTIC SURGERY | Facility: CLINIC | Age: 27
End: 2022-08-23

## 2022-08-25 NOTE — TELEPHONE ENCOUNTER
Pt had surgery with Dr Busch on 3/21/22 for bilateral mastectomy revision. Pt paid out of pocket at the time and has since worked with their insurance to receive reimbursement for this medically necessary surgery. Pt requested an itemized bill from billing and observed that they were billed for a micropeel, which is not the procedure they had. Pt requested support from Dr Busch's team for correction.    Called billing office and spoke with Brayan who reviewed pt's case. Brayan a detailed message about the patient's situation along with a coding review. Clarified with Brayan that the coding department has 30 days to review a patient's case to review for any coding mistakes and corrections that need to be made.    Called pt to inform them of this. Unable to reach, left voicemail with brief description of call. Left callback number and will send [employer] message to pt.    ISAIAH Diaz

## 2022-09-06 ENCOUNTER — E-VISIT (OUTPATIENT)
Dept: PEDIATRICS | Facility: CLINIC | Age: 27
End: 2022-09-06
Payer: COMMERCIAL

## 2022-09-06 DIAGNOSIS — R21 RASH IN ADULT: Primary | ICD-10-CM

## 2022-09-06 PROCEDURE — 99421 OL DIG E/M SVC 5-10 MIN: CPT | Performed by: NURSE PRACTITIONER

## 2022-09-07 RX ORDER — KETOCONAZOLE 20 MG/ML
SHAMPOO TOPICAL DAILY PRN
Qty: 100 ML | Refills: 0 | Status: SHIPPED | OUTPATIENT
Start: 2022-09-07 | End: 2023-05-19

## 2022-09-18 ENCOUNTER — HEALTH MAINTENANCE LETTER (OUTPATIENT)
Age: 27
End: 2022-09-18

## 2022-09-26 ENCOUNTER — TELEPHONE (OUTPATIENT)
Dept: PLASTIC SURGERY | Facility: CLINIC | Age: 27
End: 2022-09-26

## 2022-09-26 NOTE — TELEPHONE ENCOUNTER
"Pt left voicemail with this RN, stating that they have not heard from [facility] billing department to correct their itemized bill.    Pt had surgery on 3/21/22 for gender affirming bilateral mastectomy with Dr Busch. Pt paid out of pocket for the cost of this surgery at the time. Last month pt contacted this RN stating that their insurance will reimburse them for their payment of this service, but when they asked for an itemized bill, it showed that the pt had a \"micropeel\" procedure, which is not correct. Pt tried getting this corrected with the billing department but was unable to have success. This RN called one month ago, spoke with the billing department, and was told that the pt would be contacted within one month to discuss the results of the coding review. Pt was never contacted. This RN called the billing department and spoke with them for nearly two hours while they reviewed pt's billing history/claims, worked with the leadership team, and tried to determine how to remedy the problem with pt's bill and insurance billing. This RN was told that the pt would need to contact them to receive details. Discussed with the billing team member that it is not the patient's fault, they have tried to contact the billing department previously and were unsuccessful in receiving support in this matter, and that they should receive a phone call from the billing department who made the errors in patient's bill. The leadership team member refused to call the patient, saying the department does not place outbound calls. Advocated for the patient, saying that this situation is no fault of their own, that the billing department is responsible for the errors made and for not following up with the patient's previous inquiries or this RNs, and that an exception in this situation is important for patient care. The person on the phone went back and forth talking to their leadership team, who affirmed that they will not contact the " patient. Pt must initiate contact by calling [phone] or bu sending a [employer] message to the billing department. Will contact the pt to inform them of this.    ISAIAH Diaz

## 2022-10-03 DIAGNOSIS — Z78.9 FEMALE-TO-MALE TRANSGENDER PERSON: ICD-10-CM

## 2022-10-03 RX ORDER — TESTOSTERONE CYPIONATE 200 MG/ML
40 INJECTION, SOLUTION INTRAMUSCULAR WEEKLY
Qty: 10 ML | Refills: 0 | Status: CANCELLED | OUTPATIENT
Start: 2022-10-03

## 2022-10-04 ENCOUNTER — MYC REFILL (OUTPATIENT)
Dept: PEDIATRICS | Facility: CLINIC | Age: 27
End: 2022-10-04

## 2022-10-04 DIAGNOSIS — Z78.9 FEMALE-TO-MALE TRANSGENDER PERSON: ICD-10-CM

## 2022-10-04 NOTE — TELEPHONE ENCOUNTER
Routing refill request to provider for review/approval because:  Drug not on the [facility] refill protocol   Labs not current:  Lipid, hematocrit    Bri Boyer RN on 10/4/2022 at 11:49 AM

## 2022-10-06 RX ORDER — TESTOSTERONE CYPIONATE 200 MG/ML
40 INJECTION, SOLUTION INTRAMUSCULAR WEEKLY
Qty: 10 ML | Refills: 0 | Status: SHIPPED | OUTPATIENT
Start: 2022-10-06 | End: 2023-04-21

## 2022-10-06 NOTE — TELEPHONE ENCOUNTER
Routing refill request to provider for review/approval because:  Androgen Agents Failed 10/04/2022 01:19 PM   Protocol Details  Lipid panel on file in past 12 mos    HCT less than 54% on file within past 12 mos    Refills for this classification require provider review     Dora Conley RN

## 2023-01-25 ENCOUNTER — MYC MEDICAL ADVICE (OUTPATIENT)
Dept: PEDIATRICS | Facility: CLINIC | Age: 28
End: 2023-01-25
Payer: COMMERCIAL

## 2023-01-25 DIAGNOSIS — F33.1 MAJOR DEPRESSIVE DISORDER, RECURRENT EPISODE, MODERATE (H): Primary | ICD-10-CM

## 2023-01-25 RX ORDER — FLUOXETINE 10 MG/1
CAPSULE ORAL
Qty: 49 CAPSULE | Refills: 0 | Status: SHIPPED | OUTPATIENT
Start: 2023-01-25 | End: 2023-04-20

## 2023-01-25 RX ORDER — VENLAFAXINE HYDROCHLORIDE 37.5 MG/1
CAPSULE, EXTENDED RELEASE ORAL
Qty: 28 CAPSULE | Refills: 0 | Status: SHIPPED | OUTPATIENT
Start: 2023-01-25 | End: 2023-04-20

## 2023-01-25 NOTE — TELEPHONE ENCOUNTER
Please review [employer] message. Patient would like to taper off of effexor. Discussed at 1/20/22 visit:    (F33.1) Major depressive disorder, recurrent episode, moderate (H)  Comment: doing ok. Has had some increase in nightmares since starting effexor. We discussed options including weaning off effexor (with or without prozac bridge to minimize bothersome side effects) versus increasing prazosin. They will consider options and follow-up via [employer].     Patient requesting taper plan.     Routing to PCP to advise.  Thanks!  Radha LEGGETT RN, BSN

## 2023-01-28 DIAGNOSIS — F33.1 MAJOR DEPRESSIVE DISORDER, RECURRENT EPISODE, MODERATE (H): ICD-10-CM

## 2023-01-28 DIAGNOSIS — F43.10 PTSD (POST-TRAUMATIC STRESS DISORDER): ICD-10-CM

## 2023-01-30 RX ORDER — PRAZOSIN HYDROCHLORIDE 1 MG/1
1 CAPSULE ORAL AT BEDTIME
Qty: 90 CAPSULE | Refills: 0 | OUTPATIENT
Start: 2023-01-30

## 2023-01-30 RX ORDER — VENLAFAXINE HYDROCHLORIDE 150 MG/1
150 CAPSULE, EXTENDED RELEASE ORAL DAILY
Qty: 90 CAPSULE | Refills: 1 | OUTPATIENT
Start: 2023-01-30

## 2023-01-30 NOTE — TELEPHONE ENCOUNTER
"Sent [employer] message in MyChart encounter from 01/25/23.      - Wilber \"Bobby\" John (he/him/his), RN - Patient Advocate Liason (PAL)  [facility]    "

## 2023-01-30 NOTE — TELEPHONE ENCOUNTER
Can you contact pt to verify they weaned off effexor and prazosin? I see this refill request came through but I suspect it's an error. If all si well, ok to cancel refill and close encounter and not forward to me

## 2023-02-03 DIAGNOSIS — F43.10 PTSD (POST-TRAUMATIC STRESS DISORDER): ICD-10-CM

## 2023-02-03 DIAGNOSIS — F33.1 MAJOR DEPRESSIVE DISORDER, RECURRENT EPISODE, MODERATE (H): ICD-10-CM

## 2023-02-03 RX ORDER — PRAZOSIN HYDROCHLORIDE 1 MG/1
1 CAPSULE ORAL AT BEDTIME
Qty: 90 CAPSULE | Refills: 0 | Status: SHIPPED | OUTPATIENT
Start: 2023-02-03 | End: 2023-02-16 | Stop reason: DRUGHIGH

## 2023-02-03 RX ORDER — VENLAFAXINE HYDROCHLORIDE 150 MG/1
150 CAPSULE, EXTENDED RELEASE ORAL DAILY
Qty: 90 CAPSULE | Refills: 0 | Status: SHIPPED | OUTPATIENT
Start: 2023-02-03 | End: 2023-02-16 | Stop reason: DRUGHIGH

## 2023-02-03 NOTE — TELEPHONE ENCOUNTER
Routing refill request to provider for review/approval because:  Failing PHQ9  Failing bp    Jada Benoit RN

## 2023-02-23 ENCOUNTER — MYC MEDICAL ADVICE (OUTPATIENT)
Dept: PEDIATRICS | Facility: CLINIC | Age: 28
End: 2023-02-23
Payer: COMMERCIAL

## 2023-04-13 SDOH — HEALTH STABILITY: PHYSICAL HEALTH: ON AVERAGE, HOW MANY DAYS PER WEEK DO YOU ENGAGE IN MODERATE TO STRENUOUS EXERCISE (LIKE A BRISK WALK)?: 4 DAYS

## 2023-04-13 SDOH — ECONOMIC STABILITY: FOOD INSECURITY: WITHIN THE PAST 12 MONTHS, YOU WORRIED THAT YOUR FOOD WOULD RUN OUT BEFORE YOU GOT MONEY TO BUY MORE.: NEVER TRUE

## 2023-04-13 SDOH — ECONOMIC STABILITY: INCOME INSECURITY: IN THE LAST 12 MONTHS, WAS THERE A TIME WHEN YOU WERE NOT ABLE TO PAY THE MORTGAGE OR RENT ON TIME?: NO

## 2023-04-13 SDOH — ECONOMIC STABILITY: INCOME INSECURITY: HOW HARD IS IT FOR YOU TO PAY FOR THE VERY BASICS LIKE FOOD, HOUSING, MEDICAL CARE, AND HEATING?: NOT VERY HARD

## 2023-04-13 SDOH — ECONOMIC STABILITY: FOOD INSECURITY: WITHIN THE PAST 12 MONTHS, THE FOOD YOU BOUGHT JUST DIDN'T LAST AND YOU DIDN'T HAVE MONEY TO GET MORE.: NEVER TRUE

## 2023-04-13 SDOH — HEALTH STABILITY: PHYSICAL HEALTH: ON AVERAGE, HOW MANY MINUTES DO YOU ENGAGE IN EXERCISE AT THIS LEVEL?: 60 MIN

## 2023-04-13 ASSESSMENT — SOCIAL DETERMINANTS OF HEALTH (SDOH)
ARE YOU MARRIED, WIDOWED, DIVORCED, SEPARATED, NEVER MARRIED, OR LIVING WITH A PARTNER?: PATIENT DECLINED
HOW OFTEN DO YOU ATTEND CHURCH OR RELIGIOUS SERVICES?: NEVER
IN A TYPICAL WEEK, HOW MANY TIMES DO YOU TALK ON THE PHONE WITH FAMILY, FRIENDS, OR NEIGHBORS?: MORE THAN THREE TIMES A WEEK
DO YOU BELONG TO ANY CLUBS OR ORGANIZATIONS SUCH AS CHURCH GROUPS UNIONS, FRATERNAL OR ATHLETIC GROUPS, OR SCHOOL GROUPS?: YES
HOW OFTEN DO YOU GET TOGETHER WITH FRIENDS OR RELATIVES?: MORE THAN THREE TIMES A WEEK

## 2023-04-13 ASSESSMENT — ENCOUNTER SYMPTOMS
ARTHRALGIAS: 1
SORE THROAT: 0
DIZZINESS: 0
BREAST MASS: 0
DYSURIA: 0
HEMATURIA: 0
PALPITATIONS: 0
NERVOUS/ANXIOUS: 0
ABDOMINAL PAIN: 0
FREQUENCY: 0
MYALGIAS: 1
CHILLS: 0
JOINT SWELLING: 0
HEADACHES: 0
WEAKNESS: 0
DIARRHEA: 0
EYE PAIN: 0
FEVER: 0
HEARTBURN: 0
NAUSEA: 0
COUGH: 0
SHORTNESS OF BREATH: 0
CONSTIPATION: 0
PARESTHESIAS: 0
HEMATOCHEZIA: 0

## 2023-04-13 ASSESSMENT — LIFESTYLE VARIABLES
SKIP TO QUESTIONS 9-10: 1
HOW MANY STANDARD DRINKS CONTAINING ALCOHOL DO YOU HAVE ON A TYPICAL DAY: PATIENT DOES NOT DRINK
HOW OFTEN DO YOU HAVE A DRINK CONTAINING ALCOHOL: NEVER
AUDIT-C TOTAL SCORE: 0
HOW OFTEN DO YOU HAVE SIX OR MORE DRINKS ON ONE OCCASION: NEVER

## 2023-04-20 ENCOUNTER — OFFICE VISIT (OUTPATIENT)
Dept: PEDIATRICS | Facility: CLINIC | Age: 28
End: 2023-04-20
Payer: COMMERCIAL

## 2023-04-20 VITALS
BODY MASS INDEX: 30.6 KG/M2 | HEIGHT: 68 IN | DIASTOLIC BLOOD PRESSURE: 68 MMHG | WEIGHT: 201.9 LBS | RESPIRATION RATE: 16 BRPM | HEART RATE: 85 BPM | TEMPERATURE: 97.5 F | OXYGEN SATURATION: 97 % | SYSTOLIC BLOOD PRESSURE: 120 MMHG

## 2023-04-20 DIAGNOSIS — Z00.00 ROUTINE GENERAL MEDICAL EXAMINATION AT A HEALTH CARE FACILITY: Primary | ICD-10-CM

## 2023-04-20 DIAGNOSIS — Z78.9 FEMALE-TO-MALE TRANSGENDER PERSON: ICD-10-CM

## 2023-04-20 DIAGNOSIS — Z20.6 EXPOSURE TO HUMAN IMMUNODEFICIENCY VIRUS: ICD-10-CM

## 2023-04-20 DIAGNOSIS — F33.1 MAJOR DEPRESSIVE DISORDER, RECURRENT EPISODE, MODERATE (H): ICD-10-CM

## 2023-04-20 PROBLEM — F48.1 DEPERSONALIZATION-DEREALIZATION DISORDER (H): Status: RESOLVED | Noted: 2018-08-20 | Resolved: 2023-04-20

## 2023-04-20 PROBLEM — F43.10 PTSD (POST-TRAUMATIC STRESS DISORDER): Chronic | Status: RESOLVED | Noted: 2018-07-12 | Resolved: 2023-04-20

## 2023-04-20 LAB
BASOPHILS # BLD AUTO: 0 10E3/UL (ref 0–0.2)
BASOPHILS NFR BLD AUTO: 0 %
EOSINOPHIL # BLD AUTO: 0 10E3/UL (ref 0–0.7)
EOSINOPHIL NFR BLD AUTO: 0 %
ERYTHROCYTE [DISTWIDTH] IN BLOOD BY AUTOMATED COUNT: 13.4 % (ref 10–15)
HCT VFR BLD AUTO: 51.4 % (ref 35–53)
HGB BLD-MCNC: 17.4 G/DL (ref 11.7–17.7)
LYMPHOCYTES # BLD AUTO: 2 10E3/UL (ref 0.8–5.3)
LYMPHOCYTES NFR BLD AUTO: 38 %
MCH RBC QN AUTO: 29.4 PG (ref 26.5–33)
MCHC RBC AUTO-ENTMCNC: 33.9 G/DL (ref 31.5–36.5)
MCV RBC AUTO: 87 FL (ref 78–100)
MONOCYTES # BLD AUTO: 0.5 10E3/UL (ref 0–1.3)
MONOCYTES NFR BLD AUTO: 10 %
NEUTROPHILS # BLD AUTO: 2.8 10E3/UL (ref 1.6–8.3)
NEUTROPHILS NFR BLD AUTO: 52 %
PLATELET # BLD AUTO: 226 10E3/UL (ref 150–450)
RBC # BLD AUTO: 5.92 10E6/UL (ref 3.8–5.9)
WBC # BLD AUTO: 5.3 10E3/UL (ref 4–11)

## 2023-04-20 PROCEDURE — 90471 IMMUNIZATION ADMIN: CPT

## 2023-04-20 PROCEDURE — 85025 COMPLETE CBC W/AUTO DIFF WBC: CPT | Performed by: NURSE PRACTITIONER

## 2023-04-20 PROCEDURE — 99395 PREV VISIT EST AGE 18-39: CPT | Mod: 25 | Performed by: NURSE PRACTITIONER

## 2023-04-20 PROCEDURE — 87389 HIV-1 AG W/HIV-1&-2 AB AG IA: CPT | Performed by: NURSE PRACTITIONER

## 2023-04-20 PROCEDURE — 80053 COMPREHEN METABOLIC PANEL: CPT | Performed by: NURSE PRACTITIONER

## 2023-04-20 PROCEDURE — 90651 9VHPV VACCINE 2/3 DOSE IM: CPT

## 2023-04-20 PROCEDURE — 90471 IMMUNIZATION ADMIN: CPT | Performed by: NURSE PRACTITIONER

## 2023-04-20 PROCEDURE — 87591 N.GONORRHOEAE DNA AMP PROB: CPT | Performed by: NURSE PRACTITIONER

## 2023-04-20 PROCEDURE — 87491 CHLMYD TRACH DNA AMP PROBE: CPT | Performed by: NURSE PRACTITIONER

## 2023-04-20 PROCEDURE — 90746 HEPB VACCINE 3 DOSE ADULT IM: CPT | Performed by: NURSE PRACTITIONER

## 2023-04-20 PROCEDURE — 36415 COLL VENOUS BLD VENIPUNCTURE: CPT | Performed by: NURSE PRACTITIONER

## 2023-04-20 PROCEDURE — 86780 TREPONEMA PALLIDUM: CPT | Performed by: NURSE PRACTITIONER

## 2023-04-20 PROCEDURE — 99214 OFFICE O/P EST MOD 30 MIN: CPT | Mod: 25 | Performed by: NURSE PRACTITIONER

## 2023-04-20 PROCEDURE — 84403 ASSAY OF TOTAL TESTOSTERONE: CPT | Performed by: NURSE PRACTITIONER

## 2023-04-20 RX ORDER — EMTRICITABINE AND TENOFOVIR DISOPROXIL FUMARATE 200; 300 MG/1; MG/1
1 TABLET, FILM COATED ORAL DAILY
Qty: 90 TABLET | Refills: 0 | Status: SHIPPED | OUTPATIENT
Start: 2023-04-20

## 2023-04-20 ASSESSMENT — ENCOUNTER SYMPTOMS
DIARRHEA: 0
COUGH: 0
MYALGIAS: 1
DYSURIA: 0
CHILLS: 0
ARTHRALGIAS: 1
DIZZINESS: 0
HEMATURIA: 0
ABDOMINAL PAIN: 0
FEVER: 0
SORE THROAT: 0
WEAKNESS: 0
HEADACHES: 0
NERVOUS/ANXIOUS: 0
CONSTIPATION: 0
NAUSEA: 0
SHORTNESS OF BREATH: 0
PALPITATIONS: 0
JOINT SWELLING: 0
EYE PAIN: 0
FREQUENCY: 0

## 2023-04-20 ASSESSMENT — PAIN SCALES - GENERAL: PAINLEVEL: NO PAIN (0)

## 2023-04-20 ASSESSMENT — PATIENT HEALTH QUESTIONNAIRE - PHQ9: SUM OF ALL RESPONSES TO PHQ QUESTIONS 1-9: 2

## 2023-04-20 NOTE — PATIENT INSTRUCTIONS
I'll fill the PREP today and do an evisit to check in.       Preventive Health Recommendations  Female Ages 26 - 39  Yearly exam:   See your health care provider every year in order to  Review health changes.   Discuss preventive care.    Review your medicines if you your doctor has prescribed any.    Until age 30: Get a Pap test every three years (more often if you have had an abnormal result).    After age 30: Talk to your doctor about whether you should have a Pap test every 3 years or have a Pap test with HPV screening every 5 years.   You do not need a Pap test if your uterus was removed (hysterectomy) and you have not had cancer.  You should be tested each year for STDs (sexually transmitted diseases), if you're at risk.   Talk to your provider about how often to have your cholesterol checked.  If you are at risk for diabetes, you should have a diabetes test (fasting glucose).  Shots: Get a flu shot each year. Get a tetanus shot every 10 years.   Nutrition:   Eat at least 5 servings of fruits and vegetables each day.  Eat whole-grain bread, whole-wheat pasta and brown rice instead of white grains and rice.  Get adequate Calcium and Vitamin D.     Lifestyle  Exercise at least 150 minutes a week (30 minutes a day, 5 days of the week). This will help you control your weight and prevent disease.  Limit alcohol to one drink per day.  No smoking.   Wear sunscreen to prevent skin cancer.  See your dentist every six months for an exam and cleaning.

## 2023-04-20 NOTE — PROGRESS NOTES
SUBJECTIVE:   CC: David is an 27 year old who presents for preventive health visit.     Patient has been advised of split billing requirements and indicates understanding: Yes  Healthy Habits:     Getting at least 3 servings of Calcium per day:  Yes    Bi-annual eye exam:  NO    Dental care twice a year:  Yes    Sleep apnea or symptoms of sleep apnea:  None    Diet:  Regular (no restrictions)    Frequency of exercise:  4-5 days/week    Duration of exercise:  45-60 minutes    Taking medications regularly:  Yes    Medication side effects:  None    PHQ-2 Total Score: 0    Additional concerns today:  No    Concerns today: renew meds - get back on truvada  Labs today  NOT fasting today    Trans person, is on T, notes things have been going well. No concerns.     Stopped prep due to not being sexually active, but restarting. Doesn't have P in rectum sex.     History of depression, was on effexor, but stopped 6 wks ago. Sleep has improved since coming off effexor. Is a full time grad student. They are in therapy currently. Withdrawal symptoms did resolve.     Today's PHQ-2 Score:       4/13/2023    10:05 AM   PHQ-2 ( 1999 [employer])   Q1: Little interest or pleasure in doing things 0   Q2: Feeling down, depressed or hopeless 0   PHQ-2 Score 0   Q1: Little interest or pleasure in doing things Not at all   Q2: Feeling down, depressed or hopeless Not at all   PHQ-2 Score 0     Social History     Tobacco Use     Smoking status: Never     Smokeless tobacco: Never   Vaping Use     Vaping status: Never Used   Substance Use Topics     Alcohol use: Never             4/13/2023    10:05 AM   Alcohol Use   Prescreen: >3 drinks/day or >7 drinks/week? No     Reviewed orders with patient.  Reviewed health maintenance and updated orders accordingly - Yes  Lab work is in process    Breast Cancer Screening:    FHS-7:       12/21/2021     9:57 AM 1/15/2022     8:22 AM 3/9/2022     8:26 AM 4/13/2023    10:08 AM   Breast CA Risk Assessment  "(FHS-7)   Did any of your first-degree relatives have breast or ovarian cancer? No No No No   Did any of your relatives have bilateral breast cancer? Unknown Unknown Unknown Unknown   Did any man in your family have breast cancer? No No No No   Did any woman in your family have breast and ovarian cancer? Yes Yes Yes No   Did any woman in your family have breast cancer before age 50 y? No No No No   Do you have 2 or more relatives with breast and/or ovarian cancer? No No No No   Do you have 2 or more relatives with breast and/or bowel cancer? No No No No       Mammogram Screening - Alternate mammogram schedule due to breast cancer history  Pertinent mammograms are reviewed under the imaging tab.    History of abnormal Pap smear: NO - age 21-29 PAP every 3 years recommended     Reviewed and updated as needed this visit by clinical staff   Tobacco  Allergies  Meds              Reviewed and updated as needed this visit by Provider                     Review of Systems   Constitutional: Negative for chills and fever.   HENT: Negative for congestion, ear pain, hearing loss and sore throat.    Eyes: Negative for pain and visual disturbance.   Respiratory: Negative for cough and shortness of breath.    Cardiovascular: Negative for chest pain and palpitations.   Gastrointestinal: Negative for abdominal pain, constipation, diarrhea and nausea.   Genitourinary: Negative for dysuria, frequency, genital sores, hematuria and urgency.   Musculoskeletal: Positive for arthralgias and myalgias. Negative for joint swelling.   Skin: Negative for rash.   Neurological: Negative for dizziness, weakness and headaches.   Psychiatric/Behavioral: The patient is not nervous/anxious.           OBJECTIVE:   /68 (BP Location: Right arm, Cuff Size: Adult Large)   Pulse 85   Temp 97.5  F (36.4  C) (Tympanic)   Resp 16   Ht 1.717 m (5' 7.6\")   Wt 91.6 kg (201 lb 14.4 oz)   LMP  (LMP Unknown)   SpO2 97%   BMI 31.06 kg/m    Physical " Exam  GENERAL: healthy, alert and no distress  EYES: Eyes grossly normal to inspection, PERRL and conjunctivae and sclerae normal  HENT: ear canals and TM's normal, nose and mouth without ulcers or lesions  NECK: no adenopathy, no asymmetry, masses, or scars and thyroid normal to palpation  RESP: lungs clear to auscultation - no rales, rhonchi or wheezes  CV: regular rate and rhythm, normal S1 S2, no S3 or S4, no murmur, click or rub, no peripheral edema and peripheral pulses strong  MS: no gross musculoskeletal defects noted, no edema  PSYCH: mentation appears normal, affect normal/bright      ASSESSMENT/PLAN:   (Z00.00) Routine general medical examination at a health care facility  (primary encounter diagnosis)  Comment: They have had sex with the penis in the past therefore will do Pap smears however they are not ready to do that today.  An appointment was scheduled to do a Pap only as soon as possible.  Plan: Treponema Abs w Reflex to RPR and Titer,         NEISSERIA GONORRHOEA PCR, CHLAMYDIA TRACHOMATIS        PCR, HC HPV VAC 9V 3 DOSE IM          (Z20.6) Exposure to human immunodeficiency virus  Comment: They used to be on Truvada and this worked well but they stopped due to not being sexually active however they would like to restart this.  They have had no known exposures or symptoms of concern currently.  We will do STD screening today and restart PrEP.  They were instructed to do an E visit in 3 months to update the prep prescription  Plan: emtricitabine-tenofovir (TRUVADA) 200-300 MG         per tablet, HIV Antigen Antibody Combo          (Z78.9) Female-to-male transgender person  Comment: Doing well on current regime.  No changes were made in dose.  Last labs were reviewed.  Plan: testosterone cypionate (DEPOTESTOSTERONE) 200         MG/ML injection, CBC with platelets and         differential, Comprehensive metabolic panel         (BMP + Alb, Alk Phos, ALT, AST, Total. Bili,         TP), Testosterone  "total          (F33.1) Major depressive disorder, recurrent episode, moderate (H)  Comment: Stable at this time.  They do have a therapist.  Has some stressors related to being in graduate school.  Overall doing well  Plan:       COUNSELING:  Reviewed preventive health counseling, as reflected in patient instructions  Special attention given to:        Regular exercise       Healthy diet/nutrition       Osteoporosis prevention/bone health       Safe sex practices/STD prevention      BMI:   Estimated body mass index is 31.06 kg/m  as calculated from the following:    Height as of this encounter: 1.717 m (5' 7.6\").    Weight as of this encounter: 91.6 kg (201 lb 14.4 oz).         David Hutchinson reports that David Hutchinson has never smoked. David Hutchinson has never used smokeless tobacco.          ELBA Jenkins [facility] ME[facility]  Answers for HPI/ROS submitted by the patient on 4/13/2023  Blood in stool: No  heartburn: No  peripheral edema: No  mood changes: No  Skin sensation changes: No  pelvic pain: No  vaginal bleeding: No  vaginal discharge: No  tenderness: No  breast mass: No  breast discharge: No  impotence: No  penile discharge: No      "

## 2023-04-21 LAB
ALBUMIN SERPL BCG-MCNC: 5 G/DL (ref 3.5–5.2)
ALP SERPL-CCNC: 98 U/L (ref 35–129)
ALT SERPL W P-5'-P-CCNC: 31 U/L (ref 10–50)
ANION GAP SERPL CALCULATED.3IONS-SCNC: 14 MMOL/L (ref 7–15)
AST SERPL W P-5'-P-CCNC: 34 U/L (ref 10–50)
BILIRUB SERPL-MCNC: 0.9 MG/DL
BUN SERPL-MCNC: 11 MG/DL (ref 6–20)
C TRACH DNA SPEC QL NAA+PROBE: NEGATIVE
CALCIUM SERPL-MCNC: 10 MG/DL (ref 8.6–10)
CHLORIDE SERPL-SCNC: 104 MMOL/L (ref 98–107)
CREAT SERPL-MCNC: 0.88 MG/DL (ref 0.51–1.17)
DEPRECATED HCO3 PLAS-SCNC: 22 MMOL/L (ref 22–29)
GFR SERPL CREATININE-BSD FRML MDRD: >90 ML/MIN/1.73M2
GLUCOSE SERPL-MCNC: 95 MG/DL (ref 70–99)
N GONORRHOEA DNA SPEC QL NAA+PROBE: NEGATIVE
POTASSIUM SERPL-SCNC: 4.2 MMOL/L (ref 3.4–5.3)
PROT SERPL-MCNC: 7.7 G/DL (ref 6.4–8.3)
SODIUM SERPL-SCNC: 140 MMOL/L (ref 136–145)

## 2023-04-21 RX ORDER — TESTOSTERONE CYPIONATE 200 MG/ML
40 INJECTION, SOLUTION INTRAMUSCULAR WEEKLY
Qty: 10 ML | Refills: 0 | Status: SHIPPED | OUTPATIENT
Start: 2023-04-21 | End: 2023-05-18

## 2023-04-22 LAB
HIV 1+2 AB+HIV1 P24 AG SERPL QL IA: NONREACTIVE
T PALLIDUM AB SER QL: NONREACTIVE

## 2023-04-25 LAB — TESTOST SERPL-MCNC: 693 NG/DL (ref 8–950)

## 2023-05-18 ENCOUNTER — PATIENT OUTREACH (OUTPATIENT)
Dept: PEDIATRICS | Facility: CLINIC | Age: 28
End: 2023-05-18

## 2023-05-18 ENCOUNTER — MYC MEDICAL ADVICE (OUTPATIENT)
Dept: PEDIATRICS | Facility: CLINIC | Age: 28
End: 2023-05-18

## 2023-05-18 ENCOUNTER — OFFICE VISIT (OUTPATIENT)
Dept: PEDIATRICS | Facility: CLINIC | Age: 28
End: 2023-05-18
Payer: COMMERCIAL

## 2023-05-18 VITALS
DIASTOLIC BLOOD PRESSURE: 80 MMHG | RESPIRATION RATE: 12 BRPM | OXYGEN SATURATION: 98 % | TEMPERATURE: 97.6 F | SYSTOLIC BLOOD PRESSURE: 120 MMHG | BODY MASS INDEX: 29.61 KG/M2 | WEIGHT: 195.4 LBS | HEART RATE: 84 BPM | HEIGHT: 68 IN

## 2023-05-18 DIAGNOSIS — Z78.9 FEMALE-TO-MALE TRANSGENDER PERSON: Primary | ICD-10-CM

## 2023-05-18 DIAGNOSIS — Z12.4 CERVICAL CANCER SCREENING: ICD-10-CM

## 2023-05-18 DIAGNOSIS — Z30.432 ENCOUNTER FOR IUD REMOVAL: ICD-10-CM

## 2023-05-18 PROCEDURE — G0123 SCREEN CERV/VAG THIN LAYER: HCPCS | Performed by: NURSE PRACTITIONER

## 2023-05-18 PROCEDURE — 58301 REMOVE INTRAUTERINE DEVICE: CPT | Performed by: NURSE PRACTITIONER

## 2023-05-18 PROCEDURE — 99214 OFFICE O/P EST MOD 30 MIN: CPT | Mod: 25 | Performed by: NURSE PRACTITIONER

## 2023-05-18 PROCEDURE — 90471 IMMUNIZATION ADMIN: CPT | Performed by: NURSE PRACTITIONER

## 2023-05-18 PROCEDURE — 90746 HEPB VACCINE 3 DOSE ADULT IM: CPT | Performed by: NURSE PRACTITIONER

## 2023-05-18 ASSESSMENT — PAIN SCALES - GENERAL: PAINLEVEL: NO PAIN (0)

## 2023-05-18 NOTE — TELEPHONE ENCOUNTER
Left message for patient to call back to schedule HPV & Hep B nurse only visit any time after 8-10-23, original nurse only appointment for 7-14-23 was too early.

## 2023-05-18 NOTE — TELEPHONE ENCOUNTER
"Patient Quality Outreach Health Maintenance - PAL RN    Summary:    PAL RN contacted pt regarding overdue health maintenance    Patient is due/failing the following:   Cervical Cancer Screening - PAP Needed  Pt has appt scheduled for today.      Topic Date Due     Hepatitis B Vaccine (2 of 3 - 19+ 3-dose series) 05/18/2023       Health Maintenance Due   Topic Date Due     PAP  Never done     HEPATITIS B IMMUNIZATION (2 of 3 - 19+ 3-dose series) 05/18/2023       Type of outreach:    Sent [employer] message.    - Advised patient if any questions or concerns comes up to call the PAL RN.   - Patient given opportunity to ask questions and at this time there is nothing further needed.     Questions for provider review:    None                                                                                     Chart routed to n/a.      - Wilber \"Bobby\" John (he/him/his), RN - Patient Advocate Liason (PAL)  [facility]      "

## 2023-05-18 NOTE — PATIENT INSTRUCTIONS
7/13/2023 is the earliest you can do the last HPV vaccine  Soonest to do last hep B vaccine is two months.

## 2023-05-18 NOTE — PROGRESS NOTES
"  Assessment & Plan     Cervical cancer screening    - Pap screen reflex to HPV if ASCUS - recommend age 25 - 29  - HPV Hold (Lab Only)    Female-to-male transgender person  Had been doing 80 mg per wk and not 40 mg per wk as our charting and prescriptions note. Has been doing 80 mg for years. Overall likes this dose, no concerns. Last labs reviewed.     Encounter for IUD removal    - REMOVE INTRAUTERINE DEVICE             ELBA Jenkins CNP  [facility] MEAGHAN Hernandez is a 28 year old, presenting for the following health issues:  Pap (IUD removal)        5/18/2023    12:50 PM   Additional Questions   Roomed by Dory   Accompanied by Cristal Miller         5/18/2023    12:50 PM   Patient Reported Additional Medications   Patient reports taking the following new medications no     History of Present Illness       Reason for visit:  Iud removal and pap    David Hutchinson eats 2-3 servings of fruits and vegetables daily.David Hutchinson consumes 0 sweetened beverage(s) daily.David Hutchinson exercises with enough effort to increase David Hutchinson's heart rate 30 to 60 minutes per day.  David Hutchinson exercises with enough effort to increase David Hutchinson's heart rate 4 days per week.   David Hutchinson is taking medications regularly.     Was getting the wrong dose of T. Is doing 80 mg per wk, and he noticed the difference because of his insurance billing.       Review of Systems   Constitutional, HEENT, cardiovascular, pulmonary, GI, , musculoskeletal, neuro, skin, endocrine and psych systems are negative, except as otherwise noted.      Objective    /80 (BP Location: Right arm, Patient Position: Chair, Cuff Size: Adult Regular)   Pulse 84   Temp 97.6  F (36.4  C) (Tympanic)   Resp 12   Ht 1.721 m (5' 7.75\")   Wt 88.6 kg (195 lb 6.4 oz)   LMP  (LMP Unknown)   SpO2 98%   BMI 29.93 kg/m    Body mass index is 29.93 kg/m .  Physical Exam   GENERAL: healthy, alert and no " distress   (female): normal female external genitalia, normal urethral meatus, vaginal mucosa, normal cervix/adnexa/uterus without masses or discharge  IUD strings visualized in os, removed with ring forveps without resistance and IUD removed intact.

## 2023-05-19 RX ORDER — TESTOSTERONE CYPIONATE 200 MG/ML
80 INJECTION, SOLUTION INTRAMUSCULAR WEEKLY
Qty: 10 ML | Refills: 0 | Status: SHIPPED | OUTPATIENT
Start: 2023-05-19 | End: 2023-10-29

## 2023-05-22 ENCOUNTER — TELEPHONE (OUTPATIENT)
Dept: PEDIATRICS | Facility: CLINIC | Age: 28
End: 2023-05-22
Payer: COMMERCIAL

## 2023-05-22 NOTE — TELEPHONE ENCOUNTER
Prior Authorization Approval    Medication: TESTOSTERONE CYPIONATE 200 MG/ML IM SOLN  Authorization Effective Date: 5/22/2023  Authorization Expiration Date: 5/21/2024  Approved Dose/Quantity: 4ml per 28 days  Insurance Company: [employer] - Phone [phone] Fax [phone]  [facility] is filling the prescription: [id number] [address] AT [address]  Pharmacy Notified: Yes  Patient Notified: Yes Pharmacy will notify patient once order is ready.

## 2023-05-23 ENCOUNTER — PATIENT OUTREACH (OUTPATIENT)
Dept: PEDIATRICS | Facility: CLINIC | Age: 28
End: 2023-05-23
Payer: COMMERCIAL

## 2023-05-23 DIAGNOSIS — R87.615 UNSATISFACTORY CERVICAL PAPANICOLAOU SMEAR: Primary | ICD-10-CM

## 2023-05-23 LAB
BKR LAB AP GYN ADEQUACY: NORMAL
BKR LAB AP GYN INTERPRETATION: NORMAL
BKR LAB AP HPV REFLEX: NORMAL
BKR LAB AP PREVIOUS ABNORMAL: NORMAL
PATH REPORT.COMMENTS IMP SPEC: NORMAL
PATH REPORT.COMMENTS IMP SPEC: NORMAL
PATH REPORT.RELEVANT HX SPEC: NORMAL

## 2023-06-27 ENCOUNTER — OFFICE VISIT (OUTPATIENT)
Dept: PEDIATRICS | Facility: CLINIC | Age: 28
End: 2023-06-27
Payer: COMMERCIAL

## 2023-06-27 VITALS
OXYGEN SATURATION: 98 % | TEMPERATURE: 98.1 F | RESPIRATION RATE: 16 BRPM | HEIGHT: 68 IN | WEIGHT: 190.4 LBS | DIASTOLIC BLOOD PRESSURE: 64 MMHG | SYSTOLIC BLOOD PRESSURE: 106 MMHG | BODY MASS INDEX: 28.85 KG/M2 | HEART RATE: 72 BPM

## 2023-06-27 DIAGNOSIS — M54.41 CHRONIC RIGHT-SIDED LOW BACK PAIN WITH RIGHT-SIDED SCIATICA: Primary | ICD-10-CM

## 2023-06-27 DIAGNOSIS — M25.551 HIP PAIN, RIGHT: ICD-10-CM

## 2023-06-27 DIAGNOSIS — G89.29 CHRONIC RIGHT-SIDED LOW BACK PAIN WITH RIGHT-SIDED SCIATICA: Primary | ICD-10-CM

## 2023-06-27 PROCEDURE — 99213 OFFICE O/P EST LOW 20 MIN: CPT | Performed by: NURSE PRACTITIONER

## 2023-06-27 ASSESSMENT — PAIN SCALES - GENERAL: PAINLEVEL: MODERATE PAIN (4)

## 2023-06-27 ASSESSMENT — ENCOUNTER SYMPTOMS: HIP PAIN: 1

## 2023-06-27 NOTE — PROGRESS NOTES
Assessment & Plan   Chronic right-sided low back pain with right-sided sciatica  Hip pain, right  Due to duration of symptoms, will have pt see spine (seems likely back issue want to r/o hip problem as well).  Also re-refer to PT.  - Physical Therapy Referral; Future  - Spine  Referral; Future    Patient Instructions   Voltaren gel up to 3-4 x per day    Referral to PT and ortho     Ángela Muse NP  [facility] MEAGH[facility] Hernandez is a 28 year old, presenting for the following health issues:  Hip Pain        6/27/2023     9:14 AM   Additional Questions   Roomed by Miriam Lopez CMA     Hip Pain    History of Present Illness       Reason for visit:  Hip and lower back pain  Symptom onset:  More than a month  Symptoms include:  Pain, weakness, unable to sit comfortably  Symptom intensity:  Moderate  Symptom progression:  Staying the same  Had these symptoms before:  Yes  Has tried/received treatment for these symptoms:  Yes  Previous treatment was successful:  No  What makes it worse:  Sitting, weight lifting, biking, bending forward  What makes it better:  Ibuprofen, foam rolling quads\hip flexors    David Hutchinson eats 2-3 servings of fruits and vegetables daily.David Hutchinson consumes 0 sweetened beverage(s) daily.David Hutchinson exercises with enough effort to increase David Hutchinson's heart rate 30 to 60 minutes per day.  David Hutchinson exercises with enough effort to increase David Hutchinson's heart rate 3 or less days per week.   David Hutchinson is taking medications regularly.     Right hip /low back  Hx of scoliosis  Noted sudden pain with dead lifting  Denies loss of bowel or bladder function, numbness, tingling  Had a sharp pain to low back/hip last week, now not sharp     Did a couple sessions of PT at outside facility, didn't help. Did this around March      Review of Systems         Objective    /64 (BP Location: Right arm, Cuff Size: Adult Large)   Pulse 72   Temp 98.1  " F (36.7  C) (Tympanic)   Resp 16   Ht 1.727 m (5' 8\")   Wt 86.4 kg (190 lb 6.4 oz)   LMP  (LMP Unknown)   SpO2 98%   BMI 28.95 kg/m    Body mass index is 28.95 kg/m .  Physical Exam   GENERAL APPEARANCE: healthy, alert and no distress  MSK: No obvious deformity of spine. No bruising, redness, or masses. Spine, SI joint, and paraspinal muscles non-tender to palpation. LE 5/5 strength.                      "

## 2023-07-24 ENCOUNTER — E-VISIT (OUTPATIENT)
Dept: PEDIATRICS | Facility: CLINIC | Age: 28
End: 2023-07-24
Payer: COMMERCIAL

## 2023-07-24 DIAGNOSIS — L64.9 MALE PATTERN BALDNESS: Primary | ICD-10-CM

## 2023-07-24 PROCEDURE — 99421 OL DIG E/M SVC 5-10 MIN: CPT | Mod: 95 | Performed by: NURSE PRACTITIONER

## 2023-07-27 RX ORDER — FINASTERIDE 5 MG/1
5 TABLET, FILM COATED ORAL DAILY
Qty: 90 TABLET | Refills: 1 | Status: SHIPPED | OUTPATIENT
Start: 2023-07-27 | End: 2024-01-28

## 2023-08-17 ENCOUNTER — TELEPHONE (OUTPATIENT)
Dept: PEDIATRICS | Facility: CLINIC | Age: 28
End: 2023-08-17
Payer: COMMERCIAL

## 2023-08-17 NOTE — TELEPHONE ENCOUNTER
"1st / 2nd Attempt Patient Quality Outreach Health Maintenance - PAL RN    Summary:    PAL RN attempted (attempt # [phone]) to contact pt regarding overdue health maintenance    Patient is due/failing the following:   Cervical Cancer Screening - PAP Needed    Health Maintenance Due   Topic Date Due    HEPATITIS A IMMUNIZATION (1 of 2 - Risk 2-dose series) Never done    PAP FOLLOW-UP  09/18/2023       Type of outreach:    Sent [employer] message.    - PAL RN Left VM requesting call back to further discuss and schedule appt, awaiting call back at this time.  - PAL RN sent [employer] message, advised to schedule appointment with provider    Next Steps:  Max number of attempts reached:  1 . Will try again in 90 days if patient still on fail list.    Reach out within 90 days via [employer].    Questions for provider review:    None                                                                                     Chart routed to n/a.      - Wilber \"Bobby\" John (he/him/his), RN - Patient Advocate Liason (PAL)  [facility]      "

## 2023-10-19 ENCOUNTER — THERAPY VISIT (OUTPATIENT)
Dept: PHYSICAL THERAPY | Facility: CLINIC | Age: 28
End: 2023-10-19
Attending: NURSE PRACTITIONER
Payer: COMMERCIAL

## 2023-10-19 DIAGNOSIS — G89.29 CHRONIC RIGHT-SIDED LOW BACK PAIN WITH RIGHT-SIDED SCIATICA: ICD-10-CM

## 2023-10-19 DIAGNOSIS — M25.551 HIP PAIN, RIGHT: ICD-10-CM

## 2023-10-19 DIAGNOSIS — M54.41 CHRONIC RIGHT-SIDED LOW BACK PAIN WITH RIGHT-SIDED SCIATICA: ICD-10-CM

## 2023-10-19 PROCEDURE — 97161 PT EVAL LOW COMPLEX 20 MIN: CPT | Mod: GP | Performed by: PHYSICAL THERAPIST

## 2023-10-19 PROCEDURE — 97110 THERAPEUTIC EXERCISES: CPT | Mod: GP | Performed by: PHYSICAL THERAPIST

## 2023-10-19 ASSESSMENT — ACTIVITIES OF DAILY LIVING (ADL)
WALKING_DOWN_STEEP_HILLS: NO DIFFICULTY AT ALL
WALKING_UP_STEEP_HILLS: NO DIFFICULTY AT ALL
GOING_DOWN_1_FLIGHT_OF_STAIRS: NO DIFFICULTY AT ALL
PUTTING_ON_SOCKS_AND_SHOES: SLIGHT DIFFICULTY
HOS_ADL_SCORE(%): 82.35
HOS_ADL_COUNT: 17
STEPPING_UP_AND_DOWN_CURBS: NO DIFFICULTY AT ALL
DEEP_SQUATTING: MODERATE DIFFICULTY
RECREATIONAL_ACTIVITIES: MODERATE DIFFICULTY
HOS_ADL_ITEM_SCORE_TOTAL: 56
HEAVY_WORK: MODERATE DIFFICULTY
HOW_WOULD_YOU_RATE_YOUR_CURRENT_LEVEL_OF_FUNCTION_DURING_YOUR_USUAL_ACTIVITIES_OF_DAILY_LIVING_FROM_0_TO_100_WITH_100_BEING_YOUR_LEVEL_OF_FUNCTION_PRIOR_TO_YOUR_HIP_PROBLEM_AND_0_BEING_THE_INABILITY_TO_PERFORM_ANY_OF_YOUR_USUAL_DAILY_ACTIVITIES?: 80
WALKING_INITIALLY: NO DIFFICULTY AT ALL
STANDING_FOR_15_MINUTES: SLIGHT DIFFICULTY
HOS_ADL_HIGHEST_POTENTIAL_SCORE: 68
GOING_UP_1_FLIGHT_OF_STAIRS: NO DIFFICULTY AT ALL
WALKING_APPROXIMATELY_10_MINUTES: NO DIFFICULTY AT ALL
TWISTING/PIVOTING_ON_INVOLVED_LEG: MODERATE DIFFICULTY
GETTING_INTO_AND_OUT_OF_A_BATHTUB: SLIGHT DIFFICULTY
SITTING_FOR_15_MINUTES: MODERATE DIFFICULTY
WALKING_15_MINUTES_OR_GREATER: NO DIFFICULTY AT ALL
LIGHT_TO_MODERATE_WORK: SLIGHT DIFFICULTY
ROLLING_OVER_IN_BED: NO DIFFICULTY AT ALL
GETTING_INTO_AND_OUT_OF_AN_AVERAGE_CAR: SLIGHT DIFFICULTY

## 2023-10-19 NOTE — PROGRESS NOTES
PHYSICAL THERAPY EVALUATION  Type of Visit: Evaluation    See electronic medical record for Abuse and Falls Screening details.    Subjective       Presenting condition or subjective complaint: Right low back and into right hip and into right LE including lateral thigh and then into calf.  Pain low back is constant, and most days also into right LE.  Originally injured doing weightlifting  doing deadlift February 2023.  Continues to work out at home including squats, not doing deadlift. Not able to do bike because of pain, states normally would bike to work. Also reports history of functional leg length discrepancy due to lumbar scoliosis  Date of onset: 06/27/23 (md referral date)    Relevant medical history: -- (lumbar scoliosis; so has functional leg length difference)   Dates & types of surgery: 2022 mastectomy; 2022 wrist.    Prior diagnostic imaging/testing results:       Prior therapy history for the same diagnosis, illness or injury: Yes 3 sessions of PT, some stretches and strengthening.      Living Environment  Social support:    with family members  Type of home:     Stairs to enter the home:        yes  Ramp:     Stairs inside the home:        yes  Help at home:    Equipment owned:       Employment:      Hobbies/Interests:      Patient goals for therapy: bike, weightlift, sit without pain       Objective   LUMBAR SPINE EVALUATION  PAIN: Pain Level at Rest: 0/10  Pain Level with Use: 4/10  Pain Location: right low back and into right LE  Pain Frequency: intermittent  Pain is Exacerbated By: sitting. Bending;  some pain buttock area with prolonged walking.   Pain is Relieved By: changing position, general exercise; some stretches.   POSTURE: WNL     ROM: AROM WNL  PELVIC/SI SCREEN:  slight pain right with compression SI  STRENGTH: WNL, bilateral hip strength 5/5; core stabilizers 5/5  No change in symptoms with repeated motion in standing or supine for flexion and extension  MYOTOMES:    Left Right   T12-L3  (Hip Flexion) 5 5   L2-4 (Quads)  5 5   L4 (Ankle DF) 5 5   L5 (Great Toe Ext)     S1 (Toe Raise) 5 5       NEURAL TENSION: Lumbar WNL  FLEXIBILITY: WNL  LUMBAR/HIP Special Tests:    Left Right   JUAN Negative  Negative    FADIR/Labrum/JOSE J Negative  Negative    Femoral Nerve     Andrzej's     Piriformis     Quadrant Testing     SLR Negative  Negative    Slump Slight pain right thigh/hip with this Negative    Stork with Extension     Camilo           SLR with DF negative  PELVIS/SI SPECIAL TESTS: WNL        Assessment & Plan   CLINICAL IMPRESSIONS  Medical Diagnosis: Chronic right sided low back pain with right sided sciatica; hip pain right    Treatment Diagnosis: Chronic right sided low back pain with right sided sciatica; hip pain right   Impression/Assessment: Patient is a 28 year old adult with low back and right LE; right hip complaints.  The following significant findings have been identified: Pain, Decreased ROM/flexibility, Decreased joint mobility, Decreased strength, and Decreased activity tolerance. These impairments interfere with their ability to perform self care tasks, work tasks, recreational activities, household chores, driving , household mobility, and community mobility as compared to previous level of function.     Clinical Decision Making (Complexity):  Clinical Presentation: Stable/Uncomplicated  Clinical Presentation Rationale: based on medical and personal factors listed in PT evaluation  Clinical Decision Making (Complexity): Low complexity    PLAN OF CARE  Treatment Interventions:  Interventions: Manual Therapy, Neuromuscular Re-education, Therapeutic Activity, Therapeutic Exercise, Self-Care/Home Management    Long Term Goals     PT Goal 1  Goal Identifier: Sitting  Goal Description: Minutes patient will be able to sit: 60 min, pain 1/10  for personal hygiene; to allow rest from standing; for job requirements in their work place; for community transportation  Rationale: to maximize safety  and independence with performance of ADLs and functional tasks;to maximize safety and independence within the home;to maximize safety and independence within the community;to maximize safety and independence with transportation;to maximize safety and independence with self cares  Target Date: 12/14/23      Frequency of Treatment: 1x/week x 8 weeks  Duration of Treatment: 8 weeks      Education Assessment:   Learner/Method: Patient;Pictures/Video;No Barriers to Learning  Education Comments: Educated pt on findings of the evaluation and reasoning for plan of care.  Pt was able to understand how treatment plan aligns with goals.    Risks and benefits of evaluation/treatment have been explained.   Patient/Family/caregiver agrees with Plan of Care.     Evaluation Time:     PT Eval, Low Complexity Minutes (91128): 20       Signing Clinician: Susan Hawthorne PT

## 2023-10-29 ENCOUNTER — MYC REFILL (OUTPATIENT)
Dept: PEDIATRICS | Facility: CLINIC | Age: 28
End: 2023-10-29
Payer: COMMERCIAL

## 2023-10-29 DIAGNOSIS — L64.9 MALE PATTERN BALDNESS: ICD-10-CM

## 2023-10-29 DIAGNOSIS — Z78.9 FEMALE-TO-MALE TRANSGENDER PERSON: ICD-10-CM

## 2023-10-29 RX ORDER — FINASTERIDE 5 MG/1
5 TABLET, FILM COATED ORAL DAILY
Qty: 90 TABLET | Refills: 1 | Status: CANCELLED | OUTPATIENT
Start: 2023-10-29

## 2023-10-30 RX ORDER — TESTOSTERONE CYPIONATE 200 MG/ML
80 INJECTION, SOLUTION INTRAMUSCULAR WEEKLY
Qty: 10 ML | Refills: 0 | Status: SHIPPED | OUTPATIENT
Start: 2023-10-30 | End: 2024-03-05

## 2023-11-07 ENCOUNTER — THERAPY VISIT (OUTPATIENT)
Dept: PHYSICAL THERAPY | Facility: CLINIC | Age: 28
End: 2023-11-07
Payer: COMMERCIAL

## 2023-11-07 DIAGNOSIS — G89.29 CHRONIC RIGHT-SIDED LOW BACK PAIN WITH RIGHT-SIDED SCIATICA: Primary | ICD-10-CM

## 2023-11-07 DIAGNOSIS — M54.41 CHRONIC RIGHT-SIDED LOW BACK PAIN WITH RIGHT-SIDED SCIATICA: Primary | ICD-10-CM

## 2023-11-07 DIAGNOSIS — M25.551 HIP PAIN, RIGHT: ICD-10-CM

## 2023-11-07 PROCEDURE — 97110 THERAPEUTIC EXERCISES: CPT | Mod: GP | Performed by: PHYSICAL THERAPIST

## 2023-11-17 ENCOUNTER — VIRTUAL VISIT (OUTPATIENT)
Dept: PEDIATRICS | Facility: CLINIC | Age: 28
End: 2023-11-17
Payer: COMMERCIAL

## 2023-11-17 DIAGNOSIS — Z53.9 NO SHOW: Primary | ICD-10-CM

## 2023-11-17 PROCEDURE — 99207 PR NO SHOW FOR SCHEDULED APPT: CPT | Mod: VID | Performed by: NURSE PRACTITIONER

## 2023-12-01 ENCOUNTER — THERAPY VISIT (OUTPATIENT)
Dept: PHYSICAL THERAPY | Facility: CLINIC | Age: 28
End: 2023-12-01
Payer: COMMERCIAL

## 2023-12-01 DIAGNOSIS — M25.551 HIP PAIN, RIGHT: ICD-10-CM

## 2023-12-01 DIAGNOSIS — M54.41 CHRONIC RIGHT-SIDED LOW BACK PAIN WITH RIGHT-SIDED SCIATICA: Primary | ICD-10-CM

## 2023-12-01 DIAGNOSIS — G89.29 CHRONIC RIGHT-SIDED LOW BACK PAIN WITH RIGHT-SIDED SCIATICA: Primary | ICD-10-CM

## 2023-12-01 PROCEDURE — 97110 THERAPEUTIC EXERCISES: CPT | Mod: GP | Performed by: PHYSICAL THERAPIST

## 2023-12-18 NOTE — TELEPHONE ENCOUNTER
5/18/23 Unsatisfactory pap, 29 yo. Plan repeat pap in 2-4 months.   
Kenneth Cárdenas,   Patient is lost to pap tracking follow-up. Attempts to contact pt have been made per reminder process and there has been no reply and/or no appt scheduled.      Pap Hx:  5/18/23 Unsatisfactory pap, 27 yo. Plan repeat pap in 2-4 months.   5/31/23 Pt viewed result on MyChart.  9/1/2023 Reminder MyChart  9/27/2023 Reminder call - lm  11/17/23 appt - note addedno showed appt  12/18/23 Lost to follow-up for pap tracking, annika routed to provider   
Patient due for Pap and HPV.    Reminder done today via telephone call.    
Patient due for Pap.    Reminder done today via [employer].    
no

## 2024-01-22 PROBLEM — G89.29 CHRONIC RIGHT-SIDED LOW BACK PAIN WITH RIGHT-SIDED SCIATICA: Status: RESOLVED | Noted: 2023-10-19 | Resolved: 2024-01-22

## 2024-01-22 PROBLEM — M54.41 CHRONIC RIGHT-SIDED LOW BACK PAIN WITH RIGHT-SIDED SCIATICA: Status: RESOLVED | Noted: 2023-10-19 | Resolved: 2024-01-22

## 2024-01-22 PROBLEM — M25.551 HIP PAIN, RIGHT: Status: RESOLVED | Noted: 2023-10-19 | Resolved: 2024-01-22

## 2024-01-28 DIAGNOSIS — L64.9 MALE PATTERN BALDNESS: ICD-10-CM

## 2024-01-29 RX ORDER — FINASTERIDE 5 MG/1
5 TABLET, FILM COATED ORAL DAILY
Qty: 90 TABLET | Refills: 0 | Status: SHIPPED | OUTPATIENT
Start: 2024-01-29 | End: 2024-03-22

## 2024-02-16 ENCOUNTER — THERAPY VISIT (OUTPATIENT)
Dept: PHYSICAL THERAPY | Facility: CLINIC | Age: 29
End: 2024-02-16
Payer: COMMERCIAL

## 2024-02-16 DIAGNOSIS — G89.29 CHRONIC RIGHT-SIDED LOW BACK PAIN WITH RIGHT-SIDED SCIATICA: Primary | ICD-10-CM

## 2024-02-16 DIAGNOSIS — M54.41 CHRONIC RIGHT-SIDED LOW BACK PAIN WITH RIGHT-SIDED SCIATICA: Primary | ICD-10-CM

## 2024-02-16 PROCEDURE — 97110 THERAPEUTIC EXERCISES: CPT | Mod: GP | Performed by: PHYSICAL THERAPIST

## 2024-02-16 NOTE — PROGRESS NOTES
02/16/24 0500   Appointment Info   Signing clinician's name / credentials Susan Hawthorne PT   Total/Authorized Visits E &T 8   Visits Used 4   Medical Diagnosis Chronic right sided low back pain with right sided sciatica; hip pain right   PT Tx Diagnosis Chronic right sided low back pain with right sided sciatica; hip pain right   Progress Note/Certification   Onset of illness/injury or Date of Surgery 06/27/23  (md referral date)   Therapy Frequency 1x/week x 8 weeks   Predicted Duration 8 weeks   Progress Note Due Date 12/14/23   Progress Note Completed Date 10/19/23   PT Goal 1   Goal Identifier Sitting   Goal Description Minutes patient will be able to sit: 60 min, pain 1/10  for personal hygiene; to allow rest from standing; for job requirements in their work place; for community transportation   Rationale to maximize safety and independence with performance of ADLs and functional tasks;to maximize safety and independence within the home;to maximize safety and independence within the community;to maximize safety and independence with transportation;to maximize safety and independence with self cares   Goal Progress goal not met, continues to have pain with sititng.   Target Date 12/14/23   Subjective Report   Subjective Report Symptoms continue to persist into right LE mostly to knee level- in posterior thigh,  and intermittently into right calf. Symptoms are better since he started therapy, but have plateaued and continue to persist. Symptoms are constant low back, right buttock, right hip and into right posterior thigh. Symptoms increase with sitting, less with walking. Had decreased frequency of therapy exercises to see if that helped to have recovery day in between, but symptoms actually got worse with that, so has been doing consistently.  Also has tried ibuprofen intermittently with some temporary relief.   Objective Measures   Objective Measures Objective Measure 1;Objective Measure 2   Objective  "Measure 1   Objective Measure Strength   Details bilateral LE strength 5/5; core stabilization 5/5   Objective Measure 2   Objective Measure Lumbar AROM   Details Standing lumbar AROM full into flexion, extension; sidebending.  SLR and slump are negative. SI testing negative. Right hip WNL and no increased pain; right hip testing negative. Repeated lumbar flexion increases back and right LE symptoms. Lumbar extension decreases intensity of symptoms, but does not centralize.   Treatment Interventions (PT)   Interventions Therapeutic Procedure/Exercise   Therapeutic Procedure/Exercise   Therapeutic Procedures: strength, endurance, ROM, flexibillity minutes (00547) 30   Therapeutic Procedures Ther Proc 2   Ther Proc 1 reviewed current work out exercises. Still will get some increased pain with lifting activities   Ther Proc 2 standing press overhead with weight   Ther Proc 2 - Details pt has been using 20 lb. able to do with 10 lb without weight x 10 reps, instructed to decrease weight to 10 lb and then can gradually increase as able.   PTRx Ther Proc 1 Standing Extension   PTRx Ther Proc 1 - Details x 5   PTRx Ther Proc 2 Prone On Elbows   PTRx Ther Proc 2 - Details without pillow x 30 sec   PTRx Ther Proc 3 Prone Press Ups   PTRx Ther Proc 3 - Details without pillow x 10; then added \"Sag\" last 2-3 reps with letting breath out at top of range. end range tightness but no increased pain   PTRx Ther Proc 4 Bridges with Theraband   PTRx Ther Proc 4 - Details green x 10 x 2   PTRx Ther Proc 5 birddog   PTRx Ther Proc 5 - Details x10 reps vc for keeping spine in neutral good form with this   PTRx Ther Proc 6 Standing Fire Hydrant   PTRx Ther Proc 6 - Details x 10 with good form   PTRx Ther Proc 7 Standing Fire Hydrant without Support   PTRx Ther Proc 7 - Details red band x 10 x 2   Skilled Intervention Verbal cues and demonstration used for proper performance of exercise including cues for proper form to decrease " substitutions and isolate muscle being targeted; cues for slow and controlled motion both concentrically and eccentrically; cueing for good starting position and posture, and to ensure safe performance of motion   Education   Learner/Method Patient;Pictures/Video;No Barriers to Learning   Education Comments pt prefers handouts for HEP   Plan   Home program see PTRX   Updates to plan of care issued updated handouts for HEP   Plan for next session pt does have referral for spine specialist that he has not followed up with, recommended scheduling with spine specialist for further evaluation.   Comments   Comments Overall good core strength and good compliance with HEP. Does get some relief with decreased intensity of symptoms with extension and strengthening exercises, but overall no centralization of symptoms.   Total Session Time   Timed Code Treatment Minutes 30   Total Treatment Time (sum of timed and untimed services) 30         DISCHARGE  Reason for Discharge: Pt has reached plateau with therapy. Patient does have referral for spine specialist that he has not followed up with yet, recommended scheduling with spine specialist for further evaluation.    Equipment Issued: none    Discharge Plan: Patient to continue home program.    Referring Provider:  No ref. provider found

## 2024-02-27 ENCOUNTER — TELEPHONE (OUTPATIENT)
Dept: PEDIATRICS | Facility: CLINIC | Age: 29
End: 2024-02-27
Payer: COMMERCIAL

## 2024-02-27 NOTE — TELEPHONE ENCOUNTER
"1st / 2nd Attempt Patient Quality Outreach Health Maintenance - PAL RN    Summary:    PAL RN attempted (attempt # [phone]) to contact pt regarding overdue health maintenance    Patient is due/failing the following:   Cervical Cancer Screening - PAP Needed  Depression  -  PHQ-9 needed  Physical Preventive Adult Physical Due after 04/20/2024  No scheduled visits upcoming.    Health Maintenance Due   Topic Date Due    HEPATITIS A IMMUNIZATION (1 of 2 - Risk 2-dose series) Never done    HPV IMMUNIZATION (3 - 3-dose series) 07/13/2023    PAP FOLLOW-UP  09/18/2023    PHQ-9  10/20/2023    HEPATITIS B IMMUNIZATION (3 of 3 - 19+ 3-dose series) 10/20/2023       Type of outreach:    Sent [employer] message.    - PAL RN Left VM requesting call back to further discuss and schedule appt, awaiting call back at this time.  - PAL RN sent [email or web address] message, advised to schedule appointment with provider    Next Steps:  Max number of attempts reached:  1 . Will try again in 90 days if patient still on fail list.    Reach out within 90 days via [employer].    Questions for provider review:    None                                                                                     Chart routed to n/a.      - Wilber \"Bobby\" John (he/him/his), RN - Patient Advocate Liason (PAL)  [facility]      "

## 2024-03-05 ENCOUNTER — MYC REFILL (OUTPATIENT)
Dept: PEDIATRICS | Facility: CLINIC | Age: 29
End: 2024-03-05
Payer: COMMERCIAL

## 2024-03-05 DIAGNOSIS — Z78.9 FEMALE-TO-MALE TRANSGENDER PERSON: ICD-10-CM

## 2024-03-05 RX ORDER — TESTOSTERONE CYPIONATE 200 MG/ML
80 INJECTION, SOLUTION INTRAMUSCULAR WEEKLY
Qty: 10 ML | Refills: 0 | Status: SHIPPED | OUTPATIENT
Start: 2024-03-05 | End: 2024-03-22

## 2024-03-15 ENCOUNTER — OFFICE VISIT (OUTPATIENT)
Dept: PHYSICAL MEDICINE AND REHAB | Facility: CLINIC | Age: 29
End: 2024-03-15
Attending: NURSE PRACTITIONER
Payer: COMMERCIAL

## 2024-03-15 VITALS
OXYGEN SATURATION: 99 % | HEART RATE: 63 BPM | DIASTOLIC BLOOD PRESSURE: 83 MMHG | SYSTOLIC BLOOD PRESSURE: 142 MMHG | WEIGHT: 194 LBS | BODY MASS INDEX: 30.45 KG/M2 | HEIGHT: 67 IN

## 2024-03-15 DIAGNOSIS — M54.16 LUMBAR RADICULOPATHY: Primary | ICD-10-CM

## 2024-03-15 DIAGNOSIS — M25.551 HIP PAIN, RIGHT: ICD-10-CM

## 2024-03-15 DIAGNOSIS — M51.26 LUMBAR DISC HERNIATION: ICD-10-CM

## 2024-03-15 DIAGNOSIS — M54.41 CHRONIC RIGHT-SIDED LOW BACK PAIN WITH RIGHT-SIDED SCIATICA: ICD-10-CM

## 2024-03-15 DIAGNOSIS — G89.29 CHRONIC RIGHT-SIDED LOW BACK PAIN WITH RIGHT-SIDED SCIATICA: ICD-10-CM

## 2024-03-15 PROCEDURE — 99204 OFFICE O/P NEW MOD 45 MIN: CPT | Performed by: STUDENT IN AN ORGANIZED HEALTH CARE EDUCATION/TRAINING PROGRAM

## 2024-03-15 ASSESSMENT — PAIN SCALES - GENERAL: PAINLEVEL: MILD PAIN (3)

## 2024-03-15 NOTE — LETTER
3/15/2024         RE: Syed Hutchinson  [address]  [facility] [address]        Dear Colleague,    Thank you for referring your patient, Syed Hutchinson, to the [facility] SPINE AND NEUROSURGERY. Please see a copy of my visit note below.    ASSESSMENT:  Syed Hutchinson is a 28 year old adult presents for consultation at the request of Ángela Muse who presents today for new patient evaluation of :         Diagnoses and all orders for this visit:  Lumbar radiculopathy  -     MR Lumbar Spine w/o Contrast; Future  Chronic right-sided low back pain with right-sided sciatica  -     Spine  Referral  -     MR Lumbar Spine w/o Contrast; Future  Hip pain, right  -     Spine  Referral  Lumbar disc herniation  -     MR Lumbar Spine w/o Contrast; Future       Patient is neurologically intact on exam. No myelopathic or red flag symptoms.    Patient is a 28-year-old who presents with 1 year of right-sided low back pain and lumbar radiculopathy in the setting of a lifting injury.  Patient has already tried 2 rounds of physical therapy including Neftali/extension-based therapy without relief of symptoms.  On exam he has positive neural tension testing on the right side.  In light of these findings, I recommend moving forward with an MRI lumbar spine to assess for any derangement of soft tissue or neural elements.  Suspect lower lumbosacral radiculopathy affecting the L5 or S1 segments in light of patient's radiating pain down the back of the right leg.  We will follow-up once the MRI is completed.  Patient is in agreement with this plan, and all questions were addressed.    PLAN:  Reviewed spine anatomy and disease process. Discussed diagnosis and treatment options with the patient today. A shared decision making model was used. The patient's values and choices were respected. The following represents what was discussed and decided upon by the provider and the patient.    1. DIAGNOSTIC  "[facility] Hospital Medicine  History & Physical    Patient Name: Tea Georges  MRN: [id number]  Admission Date: 6/21/2019  Attending Physician: Salvador Ayala MD   Primary Care Provider: Parth Posadas Ii, MD    [facility] Medicine Team: [facility] HOSP MED 3 Carmen Ross MD     Patient information was obtained from patient and past medical records.     Subjective:     Principal Problem:Subdural hemorrhage    Chief Complaint:   Chief Complaint   Patient presents with    Shortness of Breath     SOB after dialysis. pt received full run of dialysis. pt denies sob at this time        HPI: The patient is a 76 year old F with PMHx of ESRD (on HD M/W/F), HFpEF, COPD (2L O2 NC @ home), h/o SDH (w/ recent admission to [facility] on 5/21/19-w/bilateral SDH R>L s/p unwitnessed fall on plavix, no neurosurgical intervention) and right MCA stroke s/p thrombectomy in 2016 admitted to [facility] with increased R SDH.  After initial admission to [facility] on (5/21) pt was stepdown to hospital medicine, discharged to inpatient rehab/SNF and and then to home, however she failed to make it to her follow up appointment. Patient was admitted 06/21/19 to the hospital to hospital medicine with chief complaint of "not feeling right". Medicine team performed CT Head on 6/22 which demonstrated increase in size and chronicification of R convexity SDH with mass effect. Pt stated she had a fall 2 weeks prior to presentation w/LOC, not on any anticoagulants and had intermittent headaches since then however she denies any headaches at time of this presentation. 06/23/2019 s/p craniotomy by neurosurgery, SD placed. 6/25 Pt had MMA embolization. 6/27/19 SD drain removed, CT head was stable and patient stepped down to Hospital Medicine 3.     Past Medical History:   Diagnosis Date    Anemia in ESRD (end-stage renal disease) 5/29/2016    Anticoagulant long-term use     Aortic atherosclerosis 11/22/2016    Aortic stenosis, moderate 2/18/2016    " Asthma in adult without complication 1/8/2016    Bilateral low back pain without sciatica 11/17/2015    Blindness of right eye 11/12/2016    Brain compression 6/22/2019    CAD (coronary artery disease) 12/12/2016    Cataract     Central retinal vein occlusion, right eye 6/3/2014    CHF (congestive heart failure)     Chronic diastolic heart failure 1/8/2016    Chronic respiratory failure with hypoxia 5/29/2016    COPD (chronic obstructive pulmonary disease) 1/15/2017    Dependence on hemodialysis     Mon-Wed-Fri    Diverticulosis     Embolic stroke involving right middle cerebral artery     Encounter for blood transfusion     Enlarged LA (left atrium) 10/7/2016    Epiretinal membrane 7/17/2012    ESRD (end stage renal disease)     Essential hypertension 1/8/2016    History of GI diverticular bleed     5/22/16    Left flaccid hemiparesis 10/1/2016    Peripheral vascular disease, unspecified 11/22/2016    Right-sided cerebrovascular accident (CVA) 11/22/2016    Seizure 6/24/2019    Stroke due to embolism of right middle cerebral artery 11/13/2016    Type 2 diabetes mellitus with kidney complication, without long-term current use of insulin 5/1/2018    Type 2 diabetes mellitus with left eye affected by proliferative retinopathy without macular edema, without long-term current use of insulin 3/26/2013    Type 2 diabetes mellitus with severe nonproliferative retinopathy of right eye, without long-term current use of insulin 3/26/2013    Vitreomacular adhesion of right eye 7/17/2012       Past Surgical History:   Procedure Laterality Date    ABDOMINAL SURGERY      ANGIOGRAM-CEREBRAL N/A 6/24/2019    Performed by Kenisha Surgeon at [facility]    BREAST SURGERY      tumor removal x 2    KATE HOLES FOR SUBDURAL HEMATOMA EVACUATION Right 6/23/2019    Performed by Trevor Conner MD at [facility] OR 2ND FLR    CARDIAC SURGERY      CATARACT EXTRACTION      CHOLECYSTECTOMY      COLONOSCOPY N/A  TESTS  MRI of lumbar spine was ordered for further characterization of soft tissues and/or neural compression    2. PHYSICAL THERAPY  Patient is already performing a home program, and was encouraged to continue doing so.  Patient previously completed 2 rounds of physical therapy, second round was just completed in February 2024.  Discussed the importance of core strengthening, ROM, stretching exercises with the patient and how each of these entities is important in decreasing pain.  Explained to the patient that the purpose of physical therapy is to teach the patient a home exercise program.  These exercises need to be performed every day in order to decrease pain and prevent future occurrences of pain.  Likened it to brushing one's teeth.      3. MEDICATIONS:  Discussed multiple medication options today with patient. Discussed risks, side effects, and proper use of medications. Patient verbalized understanding.  No new medications ordered at this visit.  Patient advised to call our clinic's nurse navigation line (number provided) if they experience any side effects or intolerances with prescribed medication.    4. INTERVENTIONS:  Patient requires further imaging to assess what interventional options may be best for them.    5. OTHER REFERRALS:  No other referrals at this time.    6. FOLLOW-UP  To review imaging results once imaging is completed  Patient advised to contact our office for earlier appointment if symptoms worsening or not improving, or any side effects are noticed.    Advised patient to call the Spine Center if symptoms worsen or you have problems controlling bladder and bowel function.   ______________________________________________________________________    SUBJECTIVE:   Syed Hutchinson  is a 28 year old adult who presents today for new patient evaluation.    Patient reports about 1 year ago they had a lifting injury while dead lifting, and had immediate onset of right-sided low back pain  5/30/2016    Performed by Sam Davis MD at [facility] ENDO (2ND FLR)    COLONOSCOPY N/A 5/23/2016    Performed by WILLIAM Colvin MD at [facility] ENDO (2ND FLR)    CREATION, CRANIAL KATE HOLE, WITH HEMATOMA EVACUATION, SUBDURAL, possible crani Right 6/23/2019    [facility]    ESOPHAGOGASTRODUODENOSCOPY (EGD) N/A 5/30/2016    Performed by Sam Davis MD at [facility] ENDO (2ND FLR)    ESOPHAGOGASTRODUODENOSCOPY (EGD) N/A 5/27/2016    Performed by Cesario Rubio MD at [facility] (2ND FLR)    EXCISION, ANEURYSM Left 11/29/2018    Performed by NADINE Blum III, MD at [facility] OR [address] [place] [address]    EYE SURGERY      Fistulogram Left 11/28/2018    Performed by NADINE Blum III, MD at [facility] CATH LAB    INSERTION, CATHETER, VASCULAR, DUAL LUMEN Right 11/29/2018    Performed by NADINE Blum III, MD at [facility] OR [address] [place] [address]    PTA, Fistula  11/28/2018    Performed by NADINE Blum III, MD at [facility] CATH LAB    REVISION, AV FISTULA, Left 11/29/2018    Performed by NADINE Blum III, MD at [facility] OR [address] [place] [address]    UPPER GASTROINTESTINAL ENDOSCOPY         Review of patient's allergies indicates:  No Known Allergies    No current facility-administered medications on file prior to encounter.      Current Outpatient Medications on File Prior to Encounter   Medication Sig    acetaminophen (TYLENOL) 325 MG tablet Take 2 tablets (650 mg total) by mouth every 6 (six) hours as needed for Pain.    albuterol (PROVENTIL/VENTOLIN HFA) 90 mcg/actuation inhaler Inhale 1-2 puffs into the lungs every 6 (six) hours as needed for Wheezing.    amLODIPine (NORVASC) 10 MG tablet Take 1 tablet (10 mg total) by mouth once daily.    artificial tears (ISOPTO TEARS) 0.5 % ophthalmic solution Place 2 drops into both eyes 4 (four) times daily as needed.    carvedilol (COREG) 12.5 MG tablet Take 1 tablet (12.5 mg total) by mouth 2 (two) times daily.    ergocalciferol (ERGOCALCIFEROL) 50,000 unit Cap Take 1  associated with stiffness and radicular symptoms going down primarily the back of the right leg but at times the side in front as well.  Patient went through 2 rounds of physical therapy, first in March and April 2023 and then second from October 2023 through February 2024.  Reports that the second round of physical therapy was more extension-based and was more helpful than the first, but he is continuing to have pain both in the back and the leg.  Pain worsens with bending or sitting, improves with standing or walking.  No associated numbness or paresthesias in the leg, no weakness, no bladder or bowel incontinence reported.    No prior back surgeries or injections    -Treatment to Date: As above, also using OTC medications      Oswestry (MARIO) Questionnaire        3/12/2024    11:57 AM   OSWESTRY DISABILITY INDEX   Count 10   Sum 9   Oswestry Score (%) 18 %       Neck Disability Index:      3/12/2024    11:58 AM   Neck Disability Index (  Seb JOSEPH. and Johnie LEBRON. 1991. All rights reserved.; used with permission)   SECTION 1 - PAIN INTENSITY 0   SECTION 2 - PERSONAL CARE 0   SECTION 3 - LIFTING 0   SECTION 4 - READING 0   SECTION 5 - HEADACHES 0   SECTION 6 - CONCENTRATION 0   SECTION 7 - WORK 0   SECTION 8 - DRIVING 0   SECTION 9 - SLEEPING 0   SECTION 10 - RECREATION 0   Count 10   Sum 0   Raw Score: /50 0   Neck Disability Index Score: (%) 0 %              -Medications:    Current Outpatient Medications   Medication     emtricitabine-tenofovir (TRUVADA) 200-300 MG per tablet     finasteride (PROSCAR) 5 MG tablet     Loratadine (CLARITIN PO)     testosterone cypionate (DEPOTESTOSTERONE) 200 MG/ML injection     No current facility-administered medications for this visit.       Allergies   Allergen Reactions     Amoxicillin      hives       Past Medical History:   Diagnosis Date     Depersonalization-derealization disorder (H) 8/20/2018     Depressive disorder      PTSD (post-traumatic stress disorder) 7/12/2018     "    Patient Active Problem List   Diagnosis     Major depressive disorder, recurrent episode, moderate (H)     Female-to-male transgender person     Unsatisfactory cervical Papanicolaou smear     Chronic right-sided low back pain with right-sided sciatica       Past Surgical History:   Procedure Laterality Date     BREAST SURGERY  2016    double mastectomy     ganglion cyst removal- right  01/2022     TRANSGENDER MASTECTOMY Bilateral 3/21/2022    Procedure: REVISION MASTECTOMY, BILATERAL, SIMPLE WITH BILATERAL NIPPLE REGRAFTING - OnQ pump;  Surgeon: Deandra Busch MD;  Location: [facility] OR       Family History   Problem Relation Age of Onset     No Known Problems Mother      Depression Father      Anxiety Disorder Father      Hypertension Paternal Grandfather      Hyperlipidemia Paternal Grandfather      Other Cancer Paternal Grandfather         skin     Breast Cancer Paternal Grandmother      Depression Sister      Anxiety Disorder Sister      Thyroid Disease Sister        Reviewed past medical, surgical, and family history with patient found on new patient intake packet located in EMR Media tab.     SOCIAL HX: Reviewed    ROS: Specifically negative for bowel/bladder dysfunction, balance changes, headache, dizziness, foot drop, fevers, chills, appetite changes, nausea/vomiting, unexplained weight loss. Otherwise 13 systems reviewed are negative. Please see the patient's intake questionnaire from today for details.    OBJECTIVE:  BP (!) 142/83 (BP Location: Right arm, Patient Position: Sitting)   Pulse 63   Ht 5' 7\" (1.702 m)   Wt 194 lb (88 kg)   SpO2 99%   BMI 30.38 kg/m      PHYSICAL EXAMINATION:  --CONSTITUTIONAL: Vital signs as above. No acute distress. The patient is well nourished and well groomed.  --PSYCHIATRIC: The patient is awake, alert, oriented to person, place, time and answering questions appropriately with clear speech. Appropriate mood and affect   --RESPIRATORY: Normal rhythm and effort. " capsule (50,000 Units total) by mouth every 7 days.    fluticasone-vilanterol (BREO ELLIPTA) 200-25 mcg/dose DsDv diskus inhaler Inhale 1 puff into the lungs once daily.    hydrALAZINE (APRESOLINE) 100 MG tablet Take 1 tablet (100 mg total) by mouth every 8 (eight) hours.    losartan (COZAAR) 50 MG tablet Take 1 tablet (50 mg total) by mouth once daily.    ondansetron (ZOFRAN-ODT) 8 MG TbDL Take 1 tablet (8 mg total) by mouth every 6 (six) hours as needed (nausea).    polyethylene glycol (GLYCOLAX) 17 gram PwPk Take 17 g by mouth once daily.    RENVELA 800 mg Tab Take 1 tablet (800 mg total) by mouth 3 (three) times daily with meals.    senna-docusate 8.6-50 mg (PERICOLACE) 8.6-50 mg per tablet Take 1 tablet by mouth daily as needed for Constipation.    senna-docusate 8.6-50 mg (PERICOLACE) 8.6-50 mg per tablet Take 1 tablet by mouth 2 (two) times daily.     Family History     Problem Relation (Age of Onset)    Cancer Sister    Cataracts Mother    Esophageal cancer Sister    Glaucoma Brother, Maternal Aunt    Heart disease Brother    Heart failure Sister    Hypertension Mother, Sister, Brother, Father    No Known Problems Maternal Uncle, Paternal Aunt, Paternal Uncle, Maternal Grandmother, Maternal Grandfather, Paternal Grandmother, Paternal Grandfather    Stroke Mother        Tobacco Use    Smoking status: Never Smoker    Smokeless tobacco: Never Used   Substance and Sexual Activity    Alcohol use: No     Comment: Reports occasional 1-2 drinks     Drug use: No    Sexual activity: Never     Review of Systems   Constitutional: Negative for chills, fatigue and fever.   HENT: Negative.    Respiratory: Negative.    Cardiovascular: Negative.    Gastrointestinal: Negative.    Genitourinary: Negative.    Musculoskeletal: Negative.    Neurological: Positive for weakness.     Objective:     Vital Signs (Most Recent):  Temp: 97.3 °F (36.3 °C) (06/28/19 0855)  Pulse: (!) 52 (06/28/19 1134)  Resp: 18 (06/28/19  1056)  BP: (!) 110/59 (06/28/19 1056)  SpO2: 97 % (06/28/19 1056) Vital Signs (24h Range):  Temp:  [96 °F (35.6 °C)-97.9 °F (36.6 °C)] 97.3 °F (36.3 °C)  Pulse:  [47-60] 52  Resp:  [11-33] 18  SpO2:  [97 %-100 %] 97 %  BP: (110-143)/(56-86) 110/59     Weight: 51.7 kg (113 lb 15.7 oz)  Body mass index is 20.19 kg/m².    Physical Exam   Constitutional: She appears well-developed. No distress.   HENT:   Head: Normocephalic.   R sided cranial staples, clean and dry. Without sign of infection.    Eyes:   R pupil total opacification   Cardiovascular: Normal rate and regular rhythm.   RAMIRO   Pulmonary/Chest: Effort normal. No respiratory distress.   Abdominal: Soft. She exhibits no distension.   Neurological: She is alert.   Oriented to person and place.    Skin: She is not diaphoretic.           Significant Labs: All pertinent labs within the past 24 hours have been reviewed.    Significant Imaging: I have reviewed all pertinent imaging results/findings within the past 24 hours.    Assessment/Plan:     * Subdural hemorrhage  H/o recent bilateral acute SDH, R>L with right to left midline shift on plavix and reversed with platelets. No surgical intervention prformed per Nsgy recs. Appears plavix and ASA held since this event. Re-presented for malaise found to have enlarged SDH. 06/23/2019 s/p craniotomy by neurosurgery, SD placed. 6/25 Pt had MMA embolization. 6/27/19 SD drain removed, CT head was stable, stepped down to medicine 06/28/2019.    - Follow NSGY recs  - will need placement, SNF vs rehab  - PT/OT/Rehab consult    Type 2 diabetes mellitus with chronic kidney disease on chronic dialysis, without long-term current use of insulin  Place on LDSS      (HFpEF) heart failure with preserved ejection fraction  Nonrheumatic aortic valve stenosis  Non-rheumatic tricuspid valve insufficiency    - TTE 5/22 shows EF 53, grade II DD, severe LAE, moderate TR and MR, PASP 77, CVP 15 mm  - continue GDMT, as tolerated    ESRD on  No abnormal accessory muscle breathing patterns noted.   --GROSS MOTOR: Easily arises from a seated position.  --LUMBAR SPINE: Inspection reveals no evidence of deformity. Range of motion is not limited in flexion, extension, lateral rotation but pain worsens with flexion. Mild tenderness to palpation of lumbar paraspinals, no tenderness in spinous processes.  Facet loading (Doe test) negative, seated SLR positive on the right side  --HIPS: Full range of motion bilaterally.  --LOWER EXTREMITY MOTOR TESTING:  Hip flexion left 5/5, right 5/5  Knee extension left 5/5, right 5/5  Ankle dorsiflexors left 5/5, right 5/5  Ankle plantarflexors left 5/5, right 5/5   Great toe extension left 5/5, right 5/5   Knee flexion left 5/5, right 5/5  --NEUROLOGIC: Sensation to lower extremities is intact bilaterally in L2-S1 dermatomes.  Negative Schilling's bilaterally. Reflexes intact in BLE, no clonus.  --VASCULAR: Warm upper limbs bilaterally. Capillary refill in the upper extremities is less than 1 second.    RESULTS: Available medical records from [facility] and any other outside records were reviewed today.     Imaging:  Available relevant imaging was personally reviewed and interpreted today. The images were shown to the patient and the findings were explained using a spine model.    No results found.       Again, thank you for allowing me to participate in the care of your patient.        Sincerely,        Rick Leigh MD   hemodialysis  Hyperparathyroidism, secondary renal  Anemia in ESRD (end-stage renal disease)  HD MWF via LUE AVF  - nephrology consulted for HD while admitted  - H/H stable; EPO with dialysis, PRN  - strict I/O, daily weights    Pulmonary hypertension        Chronic respiratory failure with hypoxia                                     Moderate malnutrition  - c/w beneprotein supplements    Subdural hematoma  See SDH      Non-rheumatic tricuspid valve insufficiency                          Pulmonary emphysema  Chronic respiratory failure with hypoxia  - c/w ICS/LABA, PRN duonebs  - c/w 2L NC O2    CAD (coronary artery disease)        Physical debility  PT/OT consulted  appears there are social issues and home and family can no longer care for patient who requires 24 hour care.     Blindness of right eye        Left spastic hemiparesis        Hyperparathyroidism, secondary renal        Anemia in ESRD (end-stage renal disease)            Nonrheumatic aortic valve stenosis        Renovascular hypertension  Continue home antihypertensives      Pleural effusion on right  CXR reveals chronic R moderate pleural effusion (has been present since at least 2016), now appears to be more symptomatic than baseline. Stable on home 2L NC          VTE Risk Mitigation (From admission, onward)        Ordered     IP VTE HIGH RISK PATIENT  Once      06/22/19 1515     Place sequential compression device  Until discontinued      06/22/19 0017             Carmen Ross MD  Department of Hospital Medicine   [facility]

## 2024-03-15 NOTE — PROGRESS NOTES
ASSESSMENT:  Syed Hutchinson is a 28 year old adult presents for consultation at the request of Ángela Muse who presents today for new patient evaluation of :         Diagnoses and all orders for this visit:  Lumbar radiculopathy  -     MR Lumbar Spine w/o Contrast; Future  Chronic right-sided low back pain with right-sided sciatica  -     Spine  Referral  -     MR Lumbar Spine w/o Contrast; Future  Hip pain, right  -     Spine  Referral  Lumbar disc herniation  -     MR Lumbar Spine w/o Contrast; Future       Patient is neurologically intact on exam. No myelopathic or red flag symptoms.    Patient is a 28-year-old who presents with 1 year of right-sided low back pain and lumbar radiculopathy in the setting of a lifting injury.  Patient has already tried 2 rounds of physical therapy including Neftali/extension-based therapy without relief of symptoms.  On exam he has positive neural tension testing on the right side.  In light of these findings, I recommend moving forward with an MRI lumbar spine to assess for any derangement of soft tissue or neural elements.  Suspect lower lumbosacral radiculopathy affecting the L5 or S1 segments in light of patient's radiating pain down the back of the right leg.  We will follow-up once the MRI is completed.  Patient is in agreement with this plan, and all questions were addressed.    PLAN:  Reviewed spine anatomy and disease process. Discussed diagnosis and treatment options with the patient today. A shared decision making model was used. The patient's values and choices were respected. The following represents what was discussed and decided upon by the provider and the patient.    1. DIAGNOSTIC TESTS  MRI of lumbar spine was ordered for further characterization of soft tissues and/or neural compression    2. PHYSICAL THERAPY  Patient is already performing a home program, and was encouraged to continue doing so.  Patient previously completed 2 rounds of  physical therapy, second round was just completed in February 2024.  Discussed the importance of core strengthening, ROM, stretching exercises with the patient and how each of these entities is important in decreasing pain.  Explained to the patient that the purpose of physical therapy is to teach the patient a home exercise program.  These exercises need to be performed every day in order to decrease pain and prevent future occurrences of pain.  Likened it to brushing one's teeth.      3. MEDICATIONS:  Discussed multiple medication options today with patient. Discussed risks, side effects, and proper use of medications. Patient verbalized understanding.  No new medications ordered at this visit.  Patient advised to call our clinic's nurse navigation line (number provided) if they experience any side effects or intolerances with prescribed medication.    4. INTERVENTIONS:  Patient requires further imaging to assess what interventional options may be best for them.    5. OTHER REFERRALS:  No other referrals at this time.    6. FOLLOW-UP  To review imaging results once imaging is completed  Patient advised to contact our office for earlier appointment if symptoms worsening or not improving, or any side effects are noticed.    Advised patient to call the Spine Center if symptoms worsen or you have problems controlling bladder and bowel function.   ______________________________________________________________________    SUBJECTIVE:   Syed Hutchinson  is a 28 year old adult who presents today for new patient evaluation.    Patient reports about 1 year ago they had a lifting injury while dead lifting, and had immediate onset of right-sided low back pain associated with stiffness and radicular symptoms going down primarily the back of the right leg but at times the side in front as well.  Patient went through 2 rounds of physical therapy, first in March and April 2023 and then second from October 2023 through February  2024.  Reports that the second round of physical therapy was more extension-based and was more helpful than the first, but he is continuing to have pain both in the back and the leg.  Pain worsens with bending or sitting, improves with standing or walking.  No associated numbness or paresthesias in the leg, no weakness, no bladder or bowel incontinence reported.    No prior back surgeries or injections    -Treatment to Date: As above, also using OTC medications      Oswestry (MARIO) Questionnaire        3/12/2024    11:57 AM   OSWESTRY DISABILITY INDEX   Count 10   Sum 9   Oswestry Score (%) 18 %       Neck Disability Index:      3/12/2024    11:58 AM   Neck Disability Index (  Seb H. and Johnie LEBRON. 1991. All rights reserved.; used with permission)   SECTION 1 - PAIN INTENSITY 0   SECTION 2 - PERSONAL CARE 0   SECTION 3 - LIFTING 0   SECTION 4 - READING 0   SECTION 5 - HEADACHES 0   SECTION 6 - CONCENTRATION 0   SECTION 7 - WORK 0   SECTION 8 - DRIVING 0   SECTION 9 - SLEEPING 0   SECTION 10 - RECREATION 0   Count 10   Sum 0   Raw Score: /50 0   Neck Disability Index Score: (%) 0 %              -Medications:    Current Outpatient Medications   Medication    emtricitabine-tenofovir (TRUVADA) 200-300 MG per tablet    finasteride (PROSCAR) 5 MG tablet    Loratadine (CLARITIN PO)    testosterone cypionate (DEPOTESTOSTERONE) 200 MG/ML injection     No current facility-administered medications for this visit.       Allergies   Allergen Reactions    Amoxicillin      hives       Past Medical History:   Diagnosis Date    Depersonalization-derealization disorder (H) 8/20/2018    Depressive disorder     PTSD (post-traumatic stress disorder) 7/12/2018        Patient Active Problem List   Diagnosis    Major depressive disorder, recurrent episode, moderate (H)    Female-to-male transgender person    Unsatisfactory cervical Papanicolaou smear    Chronic right-sided low back pain with right-sided sciatica       Past Surgical  "History:   Procedure Laterality Date    BREAST SURGERY  2016    double mastectomy    ganglion cyst removal- right  01/2022    TRANSGENDER MASTECTOMY Bilateral 3/21/2022    Procedure: REVISION MASTECTOMY, BILATERAL, SIMPLE WITH BILATERAL NIPPLE REGRAFTING - OnQ pump;  Surgeon: Deandra Busch MD;  Location: [facility] OR       Family History   Problem Relation Age of Onset    No Known Problems Mother     Depression Father     Anxiety Disorder Father     Hypertension Paternal Grandfather     Hyperlipidemia Paternal Grandfather     Other Cancer Paternal Grandfather         skin    Breast Cancer Paternal Grandmother     Depression Sister     Anxiety Disorder Sister     Thyroid Disease Sister        Reviewed past medical, surgical, and family history with patient found on new patient intake packet located in EMR Media tab.     SOCIAL HX: Reviewed    ROS: Specifically negative for bowel/bladder dysfunction, balance changes, headache, dizziness, foot drop, fevers, chills, appetite changes, nausea/vomiting, unexplained weight loss. Otherwise 13 systems reviewed are negative. Please see the patient's intake questionnaire from today for details.    OBJECTIVE:  BP (!) 142/83 (BP Location: Right arm, Patient Position: Sitting)   Pulse 63   Ht 5' 7\" (1.702 m)   Wt 194 lb (88 kg)   SpO2 99%   BMI 30.38 kg/m      PHYSICAL EXAMINATION:  --CONSTITUTIONAL: Vital signs as above. No acute distress. The patient is well nourished and well groomed.  --PSYCHIATRIC: The patient is awake, alert, oriented to person, place, time and answering questions appropriately with clear speech. Appropriate mood and affect   --RESPIRATORY: Normal rhythm and effort. No abnormal accessory muscle breathing patterns noted.   --GROSS MOTOR: Easily arises from a seated position.  --LUMBAR SPINE: Inspection reveals no evidence of deformity. Range of motion is not limited in flexion, extension, lateral rotation but pain worsens with flexion. Mild tenderness " to palpation of lumbar paraspinals, no tenderness in spinous processes.  Facet loading (Doe test) negative, seated SLR positive on the right side  --HIPS: Full range of motion bilaterally.  --LOWER EXTREMITY MOTOR TESTING:  Hip flexion left 5/5, right 5/5  Knee extension left 5/5, right 5/5  Ankle dorsiflexors left 5/5, right 5/5  Ankle plantarflexors left 5/5, right 5/5   Great toe extension left 5/5, right 5/5   Knee flexion left 5/5, right 5/5  --NEUROLOGIC: Sensation to lower extremities is intact bilaterally in L2-S1 dermatomes.  Negative Schilling's bilaterally. Reflexes intact in BLE, no clonus.  --VASCULAR: Warm upper limbs bilaterally. Capillary refill in the upper extremities is less than 1 second.    RESULTS: Available medical records from [facility] and any other outside records were reviewed today.     Imaging:  Available relevant imaging was personally reviewed and interpreted today. The images were shown to the patient and the findings were explained using a spine model.    No results found.

## 2024-03-15 NOTE — PROGRESS NOTES
12/01/23 0500   Appointment Info   Signing clinician's name / credentials Susan Hawthorne PT   Total/Authorized Visits E &T 8   Visits Used 3   Medical Diagnosis Chronic right sided low back pain with right sided sciatica; hip pain right   PT Tx Diagnosis Chronic right sided low back pain with right sided sciatica; hip pain right   Progress Note/Certification   Onset of illness/injury or Date of Surgery 06/27/23  (md referral date)   Therapy Frequency 1x/week x 8 weeks   Predicted Duration 8 weeks   Progress Note Due Date 12/14/23   Progress Note Completed Date 10/19/23   PT Goal 1   Goal Identifier Sitting   Goal Description Minutes patient will be able to sit: 60 min, pain 1/10  for personal hygiene; to allow rest from standing; for job requirements in their work place; for community transportation   Rationale to maximize safety and independence with performance of ADLs and functional tasks;to maximize safety and independence within the home;to maximize safety and independence within the community;to maximize safety and independence with transportation;to maximize safety and independence with self cares   Goal Progress improving, goal is nearing can sit 60 min without pain at times. still feels likes needs to get up and move around with prolonged sitting   Target Date 12/14/23   Subjective Report   Subjective Report States prolonged sitting > 2 hours will get some low back pain. If gets up and moves around then is better. Any overhead lifting with exercise such as with press up and hold type position will cause pain, no pain with pull down. Overall improved, no LE symptoms   Treatment Interventions (PT)   Interventions Therapeutic Procedure/Exercise   Therapeutic Procedure/Exercise   Therapeutic Procedures: strength, endurance, ROM, flexibillity minutes (11683) 33   Ther Proc 1 reviewed current work out exercises and updated and corrected technique; discussed using lighter weight until can do consistently without  "pain, then can gradually increase . also decrease to every other day so having day of recovery.   PTRx Ther Proc 1 Standing Extension   PTRx Ther Proc 1 - Details x 5   PTRx Ther Proc 2 Prone On Elbows   PTRx Ther Proc 2 - Details without pillow x 30 sec   PTRx Ther Proc 3 Prone Press Ups   PTRx Ther Proc 3 - Details without pillow x 10; then added \"Sag\" last 2-3 reps with letting breath out at top of range. end range tightness but no increased pain   PTRx Ther Proc 4 Bridges with Theraband   PTRx Ther Proc 4 - Details green x 10 x 2   PTRx Ther Proc 5 birddog   PTRx Ther Proc 5 - Details x10 reps vc for keeping spine in neutral good form with this   PTRx Ther Proc 6 Standing Fire Hydrant   PTRx Ther Proc 6 - Details x 10 with good form   PTRx Ther Proc 7 Standing Fire Hydrant without Support   PTRx Ther Proc 7 - Details red band x 10 x 2   Skilled Intervention Verbal cues and demonstration used for proper performance of exercise including cues for proper form to decrease substitutions and isolate muscle being targeted; cues for slow and controlled motion both concentrically and eccentrically; cueing for good starting position and posture, and to ensure safe performance of motion   Ther Proc 2 standing press overhead with weight   Ther Proc 2 - Details pt has been using 20 lb. able to do with 10 lb without weight x 10 reps, instructed to decrease weight to 10 lb and then can gradually increase as able.   Therapeutic Procedures Ther Proc 2   Education   Learner/Method Patient;Pictures/Video;No Barriers to Learning   Education Comments pt prefers handouts for HEP   Plan   Home program see PTRX   Updates to plan of care issued updated handouts for HEP   Plan for next session progress with spinal stab as able; extension ROM.   Total Session Time   Timed Code Treatment Minutes 33   Total Treatment Time (sum of timed and untimed services) 33         DISCHARGE  Reason for Discharge: Patient has failed to schedule further " appointments.    Equipment Issued: none    Discharge Plan: Patient to continue home program.    Referring Provider:  No ref. provider found     4 = No assist / stand by assistance

## 2024-03-15 NOTE — PATIENT INSTRUCTIONS
~Spine Center Scheduling #[phone].  ~Please call our [facility] Spine Nurse Navigation #[phone] with any questions or concerns about your treatment plan, if symptoms worsen and you would like to be seen urgently, or if you have problems controlling bladder and bowel function.  ~For any future flareups or new symptoms, recommend follow-up in clinic or contact the nurse navigator line.  ~Please note that any My Chart messages may take multiple days for a response due to the high volume of patients seen in clinic.  Anything sent Friday night or after will be answered the following week when able.     Imaging has been ordered. Radiology will call you to schedule. Please call below if you do not hear from them in the next couple of days.     [facility] Radiology Scheduling    Please call [phone] to schedule your image(s) (select option #1). There are 3 different locations, see below.     [facility]  [address] [place] [address] Imaging - [place]  [address], [address]   [facility] [address]    [place] [id number] [place] [address]

## 2024-03-22 ENCOUNTER — VIRTUAL VISIT (OUTPATIENT)
Dept: PEDIATRICS | Facility: CLINIC | Age: 29
End: 2024-03-22
Attending: NURSE PRACTITIONER
Payer: COMMERCIAL

## 2024-03-22 DIAGNOSIS — L64.9 MALE PATTERN BALDNESS: ICD-10-CM

## 2024-03-22 DIAGNOSIS — F32.A DEPRESSIVE DISORDER IN REMISSION: ICD-10-CM

## 2024-03-22 DIAGNOSIS — Z78.9 FEMALE-TO-MALE TRANSGENDER PERSON: Primary | ICD-10-CM

## 2024-03-22 PROCEDURE — 99214 OFFICE O/P EST MOD 30 MIN: CPT | Mod: 95 | Performed by: NURSE PRACTITIONER

## 2024-03-22 RX ORDER — FINASTERIDE 5 MG/1
5 TABLET, FILM COATED ORAL DAILY
Qty: 90 TABLET | Refills: 1 | Status: SHIPPED | OUTPATIENT
Start: 2024-03-22 | End: 2024-05-28

## 2024-03-22 RX ORDER — TESTOSTERONE CYPIONATE 200 MG/ML
80 INJECTION, SOLUTION INTRAMUSCULAR WEEKLY
Qty: 10 ML | Refills: 0 | Status: SHIPPED | OUTPATIENT
Start: 2024-03-22 | End: 2024-08-24

## 2024-03-22 NOTE — PROGRESS NOTES
"David is a 28 year old who is being evaluated via a billable video visit.          Assessment & Plan     Female-to-male transgender person  Stable, no concerns. Due for labs, will schedule  - Lipid panel reflex to direct LDL Fasting; Future  - HIV Antigen Antibody Combo; Future  - testosterone cypionate (DEPOTESTOSTERONE) 200 MG/ML injection; Inject 0.4 mLs (80 mg) into the muscle once a week    Depressive disorder in remission  Stable, came off meds and doing ok.   - Lipid panel reflex to direct LDL Fasting; Future  - HIV Antigen Antibody Combo; Future    Male pattern baldness  Stable, feels it has improved  - finasteride (PROSCAR) 5 MG tablet; Take 1 tablet (5 mg) by mouth daily            BMI  Estimated body mass index is 30.38 kg/m  as calculated from the following:    Height as of 3/15/24: 1.702 m (5' 7\").    Weight as of 3/15/24: 88 kg (194 lb).             Subjective   David is a 28 year old, presenting for the following health issues:  No chief complaint on file.    HPI     History of depression, notes came off antidepressants last year, no concerns.     Franky, no concersn for T. Is on finasteride for hair loss, doesn't notice any concerns.     No longer taking truvada, not sexually active.         Objective           Vitals:  No vitals were obtained today due to virtual visit.    Physical Exam   GENERAL: alert and no distress  EYES: Eyes grossly normal to inspection.  No discharge or erythema, or obvious scleral/conjunctival abnormalities.  RESP: No audible wheeze, cough, or visible cyanosis.    SKIN: Visible skin clear. No significant rash, abnormal pigmentation or lesions.  NEURO: Cranial nerves grossly intact.  Mentation and speech appropriate for age.  PSYCH: Appropriate affect, tone, and pace of words          Video-Visit Details    Type of service:  Video Visit   Originating Location (pt. Location): Home    Distant Location (provider location):  On-site  Platform used for Video Visit: Cristi  Signed " Electronically by: ELBA Jenkins CNP

## 2024-03-26 ENCOUNTER — HOSPITAL ENCOUNTER (OUTPATIENT)
Dept: MRI IMAGING | Facility: CLINIC | Age: 29
Discharge: HOME | End: 2024-03-26
Attending: STUDENT IN AN ORGANIZED HEALTH CARE EDUCATION/TRAINING PROGRAM | Admitting: STUDENT IN AN ORGANIZED HEALTH CARE EDUCATION/TRAINING PROGRAM
Payer: COMMERCIAL

## 2024-03-26 DIAGNOSIS — M54.16 LUMBAR RADICULOPATHY: ICD-10-CM

## 2024-03-26 DIAGNOSIS — M51.26 LUMBAR DISC HERNIATION: ICD-10-CM

## 2024-03-26 DIAGNOSIS — M54.41 CHRONIC RIGHT-SIDED LOW BACK PAIN WITH RIGHT-SIDED SCIATICA: ICD-10-CM

## 2024-03-26 DIAGNOSIS — G89.29 CHRONIC RIGHT-SIDED LOW BACK PAIN WITH RIGHT-SIDED SCIATICA: ICD-10-CM

## 2024-03-26 PROCEDURE — 72148 MRI LUMBAR SPINE W/O DYE: CPT

## 2024-03-27 ENCOUNTER — TELEPHONE (OUTPATIENT)
Dept: PHYSICAL MEDICINE AND REHAB | Facility: CLINIC | Age: 29
End: 2024-03-27
Payer: COMMERCIAL

## 2024-03-27 NOTE — TELEPHONE ENCOUNTER
Phone call to patient to review results and provider's recommendations. Left message to return call.     *Patient is scheduled for follow up with [facility] on 3/29.

## 2024-03-27 NOTE — TELEPHONE ENCOUNTER
----- Message from Rick Leigh MD sent at 3/27/2024  7:57 AM CDT -----  No emergent findings, does show some early arthritis/degenerative changes at L3-4 and L4-5. Recommend following up in clinic to review further and discuss treatment options

## 2024-03-27 NOTE — TELEPHONE ENCOUNTER
Patient returned call. Results given and explained. Discussed returning for his scheduled follow up this Friday. Results will be further reviewed/explained and next steps in plan of care determined. Stated understanding.

## 2024-03-29 ENCOUNTER — OFFICE VISIT (OUTPATIENT)
Dept: PHYSICAL MEDICINE AND REHAB | Facility: CLINIC | Age: 29
End: 2024-03-29
Payer: COMMERCIAL

## 2024-03-29 VITALS — OXYGEN SATURATION: 100 % | HEART RATE: 66 BPM | DIASTOLIC BLOOD PRESSURE: 69 MMHG | SYSTOLIC BLOOD PRESSURE: 122 MMHG

## 2024-03-29 DIAGNOSIS — M54.16 LUMBAR RADICULOPATHY: ICD-10-CM

## 2024-03-29 DIAGNOSIS — G89.29 CHRONIC RIGHT-SIDED LOW BACK PAIN WITH RIGHT-SIDED SCIATICA: Primary | ICD-10-CM

## 2024-03-29 DIAGNOSIS — M54.41 CHRONIC RIGHT-SIDED LOW BACK PAIN WITH RIGHT-SIDED SCIATICA: Primary | ICD-10-CM

## 2024-03-29 PROCEDURE — 99214 OFFICE O/P EST MOD 30 MIN: CPT | Performed by: STUDENT IN AN ORGANIZED HEALTH CARE EDUCATION/TRAINING PROGRAM

## 2024-03-29 ASSESSMENT — PAIN SCALES - GENERAL: PAINLEVEL: MILD PAIN (3)

## 2024-03-29 NOTE — LETTER
3/29/2024         RE: Syed Hutchinson  [address]  [facility] [address]        Dear Colleague,    Thank you for referring your patient, Syed Hutchinson, to the [facility]. Please see a copy of my visit note below.    ASSESSMENT:  Syed Hutchinson is a 28 year old adult presents for follow-up evaluation of :         Diagnoses and all orders for this visit:  Chronic right-sided low back pain with right-sided sciatica  -     PAIN Transforaminal MARIZA Inj Lumbosacral Two Levels Right; Future  Lumbar radiculopathy  -     PAIN Transforaminal MARIZA Inj Lumbosacral Two Levels Right; Future       Patient is neurologically intact on exam. No myelopathic or red flag symptoms.    Patient is a 28-year-old who presents for follow-up and imaging review of right-sided low back pain and lumbar radiculopathy suggesting involvement of the L3-L4 and L4-L5 segments.  We reviewed their MRI today in clinic which does show disc protrusions at the L3-4 and L4-5 levels corresponding to the area of involvement.  As the patient has been trying conservative management thus far without significant improvement, we discussed trialing interventional options which patient is interested in.  Will plan for L3-4 and L4-5 TFESI on the right side to target the suspected area of involvement.    PLAN:  Reviewed spine anatomy and disease process. Discussed diagnosis and treatment options with the patient today. A shared decision making model was used. The patient's values and choices were respected. The following represents what was discussed and decided upon by the provider and the patient.    1. DIAGNOSTIC TESTS  MRI of lumbar spine was ordered for further characterization of soft tissues and/or neural compression    2. PHYSICAL THERAPY  Patient is already performing a home program, and was encouraged to continue doing so.  Patient previously completed 2 rounds of physical therapy, second round was just completed in  February 2024.  Discussed the importance of core strengthening, ROM, stretching exercises with the patient and how each of these entities is important in decreasing pain.  Explained to the patient that the purpose of physical therapy is to teach the patient a home exercise program.  These exercises need to be performed every day in order to decrease pain and prevent future occurrences of pain.  Likened it to brushing one's teeth.      3. MEDICATIONS:  Discussed multiple medication options today with patient. Discussed risks, side effects, and proper use of medications. Patient verbalized understanding.  No new medications ordered at this visit.    4. INTERVENTIONS:  Right L3-L4 and L4-L5 Transforaminal Epidural Steroid Injection  Benefits of epidural steroid injection were reviewed including potential improvements in pain, function, and sleep. Potential risks also reviewed, including but not limited to bleeding, bruising, infection, increased or non-improvement of pain, injury to local structures, spinal infection or bleeding, nerve or spinal cord injury which could be temporary or permanent. Risks of steroid use can include increased blood sugar or blood pressure, hormonal disruption, increased fracture risk. After reviewing the risks, benefits, and alternatives, the patient was given an opportunity to ask questions. All questions were addressed, and the patient wishes to move forward with the selected injection.    5. OTHER REFERRALS:  No other referrals at this time.    6. FOLLOW-UP  In approximately 2-4 weeks to assess response to injection.  Patient advised to contact our office for earlier appointment if symptoms worsening or not improving, or any side effects are noticed.    Advised patient to call the Spine Center if symptoms worsen or you have problems controlling bladder and bowel function.   ______________________________________________________________________    SUBJECTIVE:   Syed Hutchinson  is a 28  year old adult who presents today for follow-up evaluation.    Patient reports no significant changes in their symptoms.  Continues to have low back pain more on the right side and going down the front of the right leg to the thigh and the back of the right leg to the ankle.  Pain continues to be worse with bending and lifting as well as sitting.  No new numbness or paresthesias in the leg, no weakness, no bladder or bowel incontinence reported.    No prior back surgeries or injections.  Has been doing home exercises from his last PT sessions.    -Treatment to Date: As above, also using OTC medications      Oswestry (MARIO) Questionnaire        3/12/2024    11:57 AM   OSWESTRY DISABILITY INDEX   Count 10   Sum 9   Oswestry Score (%) 18 %       Neck Disability Index:      3/12/2024    11:58 AM   Neck Disability Index (  Seb JOSEPH. and Johnie LEBRON. 1991. All rights reserved.; used with permission)   SECTION 1 - PAIN INTENSITY 0   SECTION 2 - PERSONAL CARE 0   SECTION 3 - LIFTING 0   SECTION 4 - READING 0   SECTION 5 - HEADACHES 0   SECTION 6 - CONCENTRATION 0   SECTION 7 - WORK 0   SECTION 8 - DRIVING 0   SECTION 9 - SLEEPING 0   SECTION 10 - RECREATION 0   Count 10   Sum 0   Raw Score: /50 0   Neck Disability Index Score: (%) 0 %              -Medications:    Current Outpatient Medications   Medication     emtricitabine-tenofovir (TRUVADA) 200-300 MG per tablet     finasteride (PROSCAR) 5 MG tablet     Loratadine (CLARITIN PO)     testosterone cypionate (DEPOTESTOSTERONE) 200 MG/ML injection     No current facility-administered medications for this visit.       Allergies   Allergen Reactions     Amoxicillin Rash     hives       Past Medical History:   Diagnosis Date     Depersonalization-derealization disorder (H) 8/20/2018     Depressive disorder      PTSD (post-traumatic stress disorder) 7/12/2018        Patient Active Problem List   Diagnosis     Major depressive disorder, recurrent episode, moderate (H)      Female-to-male transgender person     Unsatisfactory cervical Papanicolaou smear     Chronic right-sided low back pain with right-sided sciatica       Past Surgical History:   Procedure Laterality Date     BREAST SURGERY  2016    double mastectomy     ganglion cyst removal- right  01/2022     TRANSGENDER MASTECTOMY Bilateral 3/21/2022    Procedure: REVISION MASTECTOMY, BILATERAL, SIMPLE WITH BILATERAL NIPPLE REGRAFTING - OnQ pump;  Surgeon: Deandra Busch MD;  Location: [facility]       Family History   Problem Relation Age of Onset     No Known Problems Mother      Depression Father      Anxiety Disorder Father      Hypertension Paternal Grandfather      Hyperlipidemia Paternal Grandfather      Other Cancer Paternal Grandfather         skin     Breast Cancer Paternal Grandmother      Depression Sister      Anxiety Disorder Sister      Thyroid Disease Sister        Reviewed past medical, surgical, and family history with patient found on new patient intake packet located in EMR Media tab.     SOCIAL HX: Reviewed    ROS: Specifically negative for bowel/bladder dysfunction, balance changes, headache, dizziness, foot drop, fevers, chills, appetite changes, nausea/vomiting, unexplained weight loss. Otherwise 13 systems reviewed are negative. Please see the patient's intake questionnaire from today for details.    OBJECTIVE:  /69   Pulse 66   SpO2 100%     PHYSICAL EXAMINATION: Reviewed and updated as needed  --CONSTITUTIONAL: Vital signs as above. No acute distress. The patient is well nourished and well groomed.  --PSYCHIATRIC: The patient is awake, alert, oriented to person, place, time and answering questions appropriately with clear speech. Appropriate mood and affect   --RESPIRATORY: Normal rhythm and effort. No abnormal accessory muscle breathing patterns noted.   --GROSS MOTOR: Easily arises from a seated position.  --LUMBAR SPINE: Inspection reveals no evidence of deformity. Range of motion is not  limited in flexion, extension, lateral rotation but pain worsens with flexion. Mild tenderness to palpation of lumbar paraspinals, no tenderness in spinous processes.  Facet loading (Doe test) negative, seated SLR positive on the right side  --HIPS: Full range of motion bilaterally.  --LOWER EXTREMITY MOTOR TESTING:  Hip flexion left 5/5, right 5/5  Knee extension left 5/5, right 5/5  Ankle dorsiflexors left 5/5, right 5/5  Ankle plantarflexors left 5/5, right 5/5   Great toe extension left 5/5, right 5/5   Knee flexion left 5/5, right 5/5  --NEUROLOGIC: Sensation to lower extremities is intact bilaterally in L2-S1 dermatomes.  Negative Schilling's bilaterally. Reflexes intact in BLE, no clonus.  --VASCULAR: Warm upper limbs bilaterally. Capillary refill in the upper extremities is less than 1 second.    RESULTS: Available medical records from [facility] and any other outside records were reviewed today.     Imaging:  Available relevant imaging was personally reviewed and interpreted today. The images were shown to the patient and the findings were explained using a spine model.    3/26/2024 MRI lumbar spine:  IMPRESSION:  1.  Levoconvex scoliosis of the thoracolumbar spine.  2.  Central disc herniation at L3-L4.  3.  No high-grade stenoses.       Again, thank you for allowing me to participate in the care of your patient.        Sincerely,        Rick Leigh MD

## 2024-03-29 NOTE — PROGRESS NOTES
ASSESSMENT:  Syed Hutchinson is a 28 year old adult presents for follow-up evaluation of :         Diagnoses and all orders for this visit:  Chronic right-sided low back pain with right-sided sciatica  -     PAIN Transforaminal MARIZA Inj Lumbosacral Two Levels Right; Future  Lumbar radiculopathy  -     PAIN Transforaminal MARIZA Inj Lumbosacral Two Levels Right; Future       Patient is neurologically intact on exam. No myelopathic or red flag symptoms.    Patient is a 28-year-old who presents for follow-up and imaging review of right-sided low back pain and lumbar radiculopathy suggesting involvement of the L3-L4 and L4-L5 segments.  We reviewed their MRI today in clinic which does show disc protrusions at the L3-4 and L4-5 levels corresponding to the area of involvement.  As the patient has been trying conservative management thus far without significant improvement, we discussed trialing interventional options which patient is interested in.  Will plan for L3-4 and L4-5 TFESI on the right side to target the suspected area of involvement.    PLAN:  Reviewed spine anatomy and disease process. Discussed diagnosis and treatment options with the patient today. A shared decision making model was used. The patient's values and choices were respected. The following represents what was discussed and decided upon by the provider and the patient.    1. DIAGNOSTIC TESTS  MRI of lumbar spine was ordered for further characterization of soft tissues and/or neural compression    2. PHYSICAL THERAPY  Patient is already performing a home program, and was encouraged to continue doing so.  Patient previously completed 2 rounds of physical therapy, second round was just completed in February 2024.  Discussed the importance of core strengthening, ROM, stretching exercises with the patient and how each of these entities is important in decreasing pain.  Explained to the patient that the purpose of physical therapy is to teach the patient a  home exercise program.  These exercises need to be performed every day in order to decrease pain and prevent future occurrences of pain.  Likened it to brushing one's teeth.      3. MEDICATIONS:  Discussed multiple medication options today with patient. Discussed risks, side effects, and proper use of medications. Patient verbalized understanding.  No new medications ordered at this visit.    4. INTERVENTIONS:  Right L3-L4 and L4-L5 Transforaminal Epidural Steroid Injection  Benefits of epidural steroid injection were reviewed including potential improvements in pain, function, and sleep. Potential risks also reviewed, including but not limited to bleeding, bruising, infection, increased or non-improvement of pain, injury to local structures, spinal infection or bleeding, nerve or spinal cord injury which could be temporary or permanent. Risks of steroid use can include increased blood sugar or blood pressure, hormonal disruption, increased fracture risk. After reviewing the risks, benefits, and alternatives, the patient was given an opportunity to ask questions. All questions were addressed, and the patient wishes to move forward with the selected injection.    5. OTHER REFERRALS:  No other referrals at this time.    6. FOLLOW-UP  In approximately 2-4 weeks to assess response to injection.  Patient advised to contact our office for earlier appointment if symptoms worsening or not improving, or any side effects are noticed.    Advised patient to call the Spine Center if symptoms worsen or you have problems controlling bladder and bowel function.   ______________________________________________________________________    SUBJECTIVE:   Syed Hutchinson  is a 28 year old adult who presents today for follow-up evaluation.    Patient reports no significant changes in their symptoms.  Continues to have low back pain more on the right side and going down the front of the right leg to the thigh and the back of the right leg  to the ankle.  Pain continues to be worse with bending and lifting as well as sitting.  No new numbness or paresthesias in the leg, no weakness, no bladder or bowel incontinence reported.    No prior back surgeries or injections.  Has been doing home exercises from his last PT sessions.    -Treatment to Date: As above, also using OTC medications      Oswestry (MARIO) Questionnaire        3/12/2024    11:57 AM   OSWESTRY DISABILITY INDEX   Count 10   Sum 9   Oswestry Score (%) 18 %       Neck Disability Index:      3/12/2024    11:58 AM   Neck Disability Index (  Seb JOSEPH. and Johnie LEBRON. 1991. All rights reserved.; used with permission)   SECTION 1 - PAIN INTENSITY 0   SECTION 2 - PERSONAL CARE 0   SECTION 3 - LIFTING 0   SECTION 4 - READING 0   SECTION 5 - HEADACHES 0   SECTION 6 - CONCENTRATION 0   SECTION 7 - WORK 0   SECTION 8 - DRIVING 0   SECTION 9 - SLEEPING 0   SECTION 10 - RECREATION 0   Count 10   Sum 0   Raw Score: /50 0   Neck Disability Index Score: (%) 0 %              -Medications:    Current Outpatient Medications   Medication    emtricitabine-tenofovir (TRUVADA) 200-300 MG per tablet    finasteride (PROSCAR) 5 MG tablet    Loratadine (CLARITIN PO)    testosterone cypionate (DEPOTESTOSTERONE) 200 MG/ML injection     No current facility-administered medications for this visit.       Allergies   Allergen Reactions    Amoxicillin Rash     hives       Past Medical History:   Diagnosis Date    Depersonalization-derealization disorder (H) 8/20/2018    Depressive disorder     PTSD (post-traumatic stress disorder) 7/12/2018        Patient Active Problem List   Diagnosis    Major depressive disorder, recurrent episode, moderate (H)    Female-to-male transgender person    Unsatisfactory cervical Papanicolaou smear    Chronic right-sided low back pain with right-sided sciatica       Past Surgical History:   Procedure Laterality Date    BREAST SURGERY  2016    double mastectomy    ganglion cyst removal- right   01/2022    TRANSGENDER MASTECTOMY Bilateral 3/21/2022    Procedure: REVISION MASTECTOMY, BILATERAL, SIMPLE WITH BILATERAL NIPPLE REGRAFTING - OnQ pump;  Surgeon: Deandra Busch MD;  Location: [facility] OR       Family History   Problem Relation Age of Onset    No Known Problems Mother     Depression Father     Anxiety Disorder Father     Hypertension Paternal Grandfather     Hyperlipidemia Paternal Grandfather     Other Cancer Paternal Grandfather         skin    Breast Cancer Paternal Grandmother     Depression Sister     Anxiety Disorder Sister     Thyroid Disease Sister        Reviewed past medical, surgical, and family history with patient found on new patient intake packet located in EMR Media tab.     SOCIAL HX: Reviewed    ROS: Specifically negative for bowel/bladder dysfunction, balance changes, headache, dizziness, foot drop, fevers, chills, appetite changes, nausea/vomiting, unexplained weight loss. Otherwise 13 systems reviewed are negative. Please see the patient's intake questionnaire from today for details.    OBJECTIVE:  /69   Pulse 66   SpO2 100%     PHYSICAL EXAMINATION: Reviewed and updated as needed  --CONSTITUTIONAL: Vital signs as above. No acute distress. The patient is well nourished and well groomed.  --PSYCHIATRIC: The patient is awake, alert, oriented to person, place, time and answering questions appropriately with clear speech. Appropriate mood and affect   --RESPIRATORY: Normal rhythm and effort. No abnormal accessory muscle breathing patterns noted.   --GROSS MOTOR: Easily arises from a seated position.  --LUMBAR SPINE: Inspection reveals no evidence of deformity. Range of motion is not limited in flexion, extension, lateral rotation but pain worsens with flexion. Mild tenderness to palpation of lumbar paraspinals, no tenderness in spinous processes.  Facet loading (Doe test) negative, seated SLR positive on the right side  --HIPS: Full range of motion bilaterally.  --LOWER  EXTREMITY MOTOR TESTING:  Hip flexion left 5/5, right 5/5  Knee extension left 5/5, right 5/5  Ankle dorsiflexors left 5/5, right 5/5  Ankle plantarflexors left 5/5, right 5/5   Great toe extension left 5/5, right 5/5   Knee flexion left 5/5, right 5/5  --NEUROLOGIC: Sensation to lower extremities is intact bilaterally in L2-S1 dermatomes.  Negative Schilling's bilaterally. Reflexes intact in BLE, no clonus.  --VASCULAR: Warm upper limbs bilaterally. Capillary refill in the upper extremities is less than 1 second.    RESULTS: Available medical records from [facility] and any other outside records were reviewed today.     Imaging:  Available relevant imaging was personally reviewed and interpreted today. The images were shown to the patient and the findings were explained using a spine model.    3/26/2024 MRI lumbar spine:  IMPRESSION:  1.  Levoconvex scoliosis of the thoracolumbar spine.  2.  Central disc herniation at L3-L4.  3.  No high-grade stenoses.

## 2024-03-29 NOTE — PATIENT INSTRUCTIONS
~Spine Center Scheduling #[phone].  ~Please call our [facility] Spine Nurse Navigation #[phone] with any questions or concerns about your treatment plan, if symptoms worsen and you would like to be seen urgently, or if you have problems controlling bladder and bowel function.  ~For any future flareups or new symptoms, recommend follow-up in clinic or contact the nurse navigator line.  ~Please note that any My Chart messages may take multiple days for a response due to the high volume of patients seen in clinic.  Anything sent Friday night or after will be answered the following week when able.     An injection has been ordered today to potentially help with your pain symptoms. These injections do not fix what is going on in your back, therefore they typically do not take away the pain completely, however they can many times help improve symptoms. Injections should always be completed along with other modalities such as physical therapy for the best long term outcomes. If injections alone are done, then pain will likely return.     [facility] Injection Requirements    A  is required for all fluoroscopically-guided injections.  Injection appointments may be cancelled if there are signs/symptoms of an active infection or if the patient is being actively treated with antibiotics for a diagnosed infection.  Patients may have their steroid injection cancelled if they have had another steroid injection within 2 weeks.  Diabetic patients will have their blood glucose levels checked the day of their injection and the appointment will be rescheduled if the blood glucose level is 300 or higher.  Patients with allergies to cortisone, local anesthetics, iodine, or contrast dye should contact the Spine Center to further discuss these considerations.  Patients scheduled for medial branch block diagnostic injections should refrain from taking pain medication the day of the  procedure.  The medial branch block injection appointment will be rescheduled if the patient's pain rating is not 5/10 or greater at the time of the procedure.  Patients taking warfarin/Coumadin will have their INR checked the day of the procedure and the procedure may be rescheduled if the INR is greater than 3.0.  Please contact the Spine Center (#[phone]) if you are taking any prescription blood-thinning medications (warfarin, Plavix, Lovenox, Eliquis, Brilinta, Effient, etc.) as special dosing adjustments may need to be made depending on the type of injection you are scheduled to receive.  It is recommended that you delay having your steroid injection if you have received a flu shot or shingles vaccine within 2 weeks.

## 2024-04-12 ENCOUNTER — RADIOLOGY INJECTION OFFICE VISIT (OUTPATIENT)
Dept: PHYSICAL MEDICINE AND REHAB | Facility: CLINIC | Age: 29
End: 2024-04-12
Attending: STUDENT IN AN ORGANIZED HEALTH CARE EDUCATION/TRAINING PROGRAM
Payer: COMMERCIAL

## 2024-04-12 VITALS
WEIGHT: 190 LBS | BODY MASS INDEX: 29.82 KG/M2 | RESPIRATION RATE: 16 BRPM | SYSTOLIC BLOOD PRESSURE: 126 MMHG | HEART RATE: 73 BPM | HEIGHT: 67 IN | TEMPERATURE: 98.1 F | OXYGEN SATURATION: 98 % | DIASTOLIC BLOOD PRESSURE: 82 MMHG

## 2024-04-12 DIAGNOSIS — G89.29 CHRONIC RIGHT-SIDED LOW BACK PAIN WITH RIGHT-SIDED SCIATICA: ICD-10-CM

## 2024-04-12 DIAGNOSIS — M54.41 CHRONIC RIGHT-SIDED LOW BACK PAIN WITH RIGHT-SIDED SCIATICA: ICD-10-CM

## 2024-04-12 DIAGNOSIS — M54.16 LUMBAR RADICULOPATHY: ICD-10-CM

## 2024-04-12 PROCEDURE — 64484 NJX AA&/STRD TFRM EPI L/S EA: CPT | Mod: RT | Performed by: STUDENT IN AN ORGANIZED HEALTH CARE EDUCATION/TRAINING PROGRAM

## 2024-04-12 PROCEDURE — 64483 NJX AA&/STRD TFRM EPI L/S 1: CPT | Mod: RT | Performed by: STUDENT IN AN ORGANIZED HEALTH CARE EDUCATION/TRAINING PROGRAM

## 2024-04-12 RX ORDER — BUPIVACAINE HYDROCHLORIDE 2.5 MG/ML
INJECTION, SOLUTION EPIDURAL; INFILTRATION; INTRACAUDAL
Status: COMPLETED | OUTPATIENT
Start: 2024-04-12 | End: 2024-04-12

## 2024-04-12 RX ORDER — DEXAMETHASONE SODIUM PHOSPHATE 10 MG/ML
INJECTION, SOLUTION INTRAMUSCULAR; INTRAVENOUS
Status: COMPLETED | OUTPATIENT
Start: 2024-04-12 | End: 2024-04-12

## 2024-04-12 RX ORDER — LIDOCAINE HYDROCHLORIDE 10 MG/ML
INJECTION, SOLUTION EPIDURAL; INFILTRATION; INTRACAUDAL; PERINEURAL
Status: COMPLETED | OUTPATIENT
Start: 2024-04-12 | End: 2024-04-12

## 2024-04-12 RX ADMIN — BUPIVACAINE HYDROCHLORIDE 2 ML: 2.5 INJECTION, SOLUTION EPIDURAL; INFILTRATION; INTRACAUDAL at 12:59

## 2024-04-12 RX ADMIN — DEXAMETHASONE SODIUM PHOSPHATE 10 MG: 10 INJECTION, SOLUTION INTRAMUSCULAR; INTRAVENOUS at 12:59

## 2024-04-12 RX ADMIN — LIDOCAINE HYDROCHLORIDE 4 ML: 10 INJECTION, SOLUTION EPIDURAL; INFILTRATION; INTRACAUDAL; PERINEURAL at 12:59

## 2024-04-12 ASSESSMENT — PAIN SCALES - GENERAL
PAINLEVEL: MILD PAIN (2)
PAINLEVEL: MILD PAIN (3)

## 2024-04-12 NOTE — PATIENT INSTRUCTIONS
DISCHARGE INSTRUCTIONS    During office hours (8:00 a.m.- 4:00 p.m.) questions or concerns may be answered  by calling Spine Center Navigation Nurses at  [phone].  Messages received after hours will be returned the following business day.      In the case of an emergency, please dial 911 or seek assistance at the nearest Emergency Room/Urgent Care facility.     All Patients:    You may experience an increase in your symptoms for the first 2 days (It may take anywhere between 2 days- 2 weeks for the steroid to have maximum effect).    You may use ice on the injection site, as frequently as 20 minutes each hour if needed.    You may take your pain medicine.    You may continue taking your regular medication after your injection. If you have had a Medial Branch Block you may resume pain medication once your pain diary is completed.    You may shower. No swimming, tub bath or hot tub for 48 hours.  You may remove your bandaid/bandage as soon as you are home.    You may resume light activities, as tolerated.    Resume your usual diet as tolerated.    It is strongly advised that you do not drive for 1-3 hours post injection.    If you have had oral sedation:  Do not drive for 8 hours post injection.      If you have had IV sedation:  Do not drive for 24 hours post injection.  Do not operate hazardous machinery or make important personal/business decisions for 24 hours.      POSSIBLE STEROID SIDE EFFECTS (If steroid/cortisone was used for your procedure)    -If you experience these symptoms, it should only last for a short period    Swelling of the legs              Skin redness (flushing)     Mouth (oral) irritation   Blood sugar (glucose) levels            Sweats                    Mood changes  Headache  Sleeplessness  Weakened immune system for up to 14 days, which could increase the risk of kelli the COVID-19 virus and/or experiencing more severe symptoms of the disease, if exposed.  Decreased  effectiveness of the flu vaccine if given within 2 weeks of the steroid.         POSSIBLE PROCEDURE SIDE EFFECTS  -Call the Spine Center if you are concerned  Increased Pain           Increased numbness/tingling      Nausea/Vomiting          Bruising/bleeding at site      Redness or swelling                                              Difficulty walking      Weakness           Fever greater than 100.5    *In the event of a severe headache after an epidural steroid injection that is relieved by lying down, please call the [facility] to speak with a clinical staff member*

## 2024-04-19 ENCOUNTER — MYC MEDICAL ADVICE (OUTPATIENT)
Dept: PHYSICAL MEDICINE AND REHAB | Facility: CLINIC | Age: 29
End: 2024-04-19
Payer: COMMERCIAL

## 2024-04-19 NOTE — TELEPHONE ENCOUNTER
"Patient had returned call, but had to leave message on nurse line. Stated it \"would be easier for him if his questions were addressed through MyChart.\"   "

## 2024-04-29 ENCOUNTER — THERAPY VISIT (OUTPATIENT)
Dept: PHYSICAL THERAPY | Facility: CLINIC | Age: 29
End: 2024-04-29
Payer: COMMERCIAL

## 2024-04-29 DIAGNOSIS — M54.41 CHRONIC RIGHT-SIDED LOW BACK PAIN WITH RIGHT-SIDED SCIATICA: Primary | ICD-10-CM

## 2024-04-29 DIAGNOSIS — G89.29 CHRONIC RIGHT-SIDED LOW BACK PAIN WITH RIGHT-SIDED SCIATICA: Primary | ICD-10-CM

## 2024-04-29 PROCEDURE — 97110 THERAPEUTIC EXERCISES: CPT | Mod: GP | Performed by: PHYSICAL THERAPIST

## 2024-04-29 NOTE — PROGRESS NOTES
04/29/24 0500   Appointment Info   Signing clinician's name / credentials Susan Hawthorne, PT   Total/Authorized Visits 4 + additional 4 for total of 8   Visits Used 5   Medical Diagnosis Chronic right sided low back pain with right sided sciatica; hip pain right   PT Tx Diagnosis Chronic right sided low back pain with right sided sciatica; hip pain right   Progress Note/Certification   Onset of illness/injury or Date of Surgery 06/27/23  (md referral date)   Therapy Frequency every 2 weeks   Predicted Duration 8 weeks   Progress Note Due Date 04/16/24   Progress Note Completed Date 02/16/24   PT Goal 1   Goal Identifier Sitting   Goal Description Minutes patient will be able to sit: 60 min, pain 1/10  for personal hygiene; to allow rest from standing; for job requirements in their work place; for community transportation   Rationale to maximize safety and independence with performance of ADLs and functional tasks;to maximize safety and independence within the home;to maximize safety and independence within the community;to maximize safety and independence with transportation;to maximize safety and independence with self cares   Goal Progress goal not met, continues to have pain with sititng. goal is ongoing, timeframe updated   Target Date 06/24/24   Subjective Report   Subjective Report Pt returns after last being seen 2/16/2024. States saw spine specialist. Reports had injection at 2 levels in low back for disc herniation on 4/12/2024. States maybe some relief x 1-2days after, but then pain returned to previous level. Still pain right low back and into right LE, and will go into right foot. At times pain only to right knee level, but some pain into right LE every day. Sitting continues to be irritant and peripheralizes symptoms. Some decrease in symptoms with exercises, but nothing takes it away. Doing exercises given last visit consistently without increase in symptoms. Has stopped doing much of gym workout,  states was told not to do any weightlifting. Wants to know exactly what he can and can't do for HEP.   Objective Measures   Objective Measures Objective Measure 1;Objective Measure 2   Objective Measure 1   Objective Measure Strength   Details bilateral LE strength 5/5; core stabilization 5/5   Objective Measure 2   Objective Measure Lumbar AROM   Details Standing lumbar AROM full into flexion, extension; sidebending.  SLR and slump are negative. SI testing negative. Right hip WNL and no increased pain; right hip testing negative. Repeated lumbar flexion increases back and right LE symptoms. Lumbar extension decreases intensity of symptoms, but does not centralize.   Treatment Interventions (PT)   Interventions Therapeutic Procedure/Exercise   Therapeutic Procedure/Exercise   Therapeutic Procedures: strength, endurance, ROM, flexibility minutes (68323) 35   Therapeutic Procedures Ther Proc 2   Ther Proc 1 HEP   PTRx Ther Proc 1 Standing Extension   PTRx Ther Proc 1 - Details x 5   PTRx Ther Proc 2 Prone On Elbows   PTRx Ther Proc 2 - Details without pillow x 30 sec   PTRx Ther Proc 3 Prone Press Ups   PTRx Ther Proc 3 - Details without pillow x 10;    PTRx Ther Proc 4 Bridging #4 Bent leg   PTRx Ther Proc 4 - Details x 3 right  then left x 2 reps. added to HEP   PTRx Ther Proc 5 Supine Abdominal Exercise #3B (Two Leg Marching)   PTRx Ther Proc 5 - Details x 3 alternating right and left. added to HEP   PTRx Ther Proc 6 birddog   PTRx Ther Proc 6 - Details x10 reps  good form with this   PTRx Ther Proc 7 Prone Plank   PTRx Ther Proc 7 - Details able to do x 1 min without difficulty with HEP can continue with these   Skilled Intervention Verbal cues and demonstration used for proper performance of exercise including cues for proper form to decrease substitutions and isolate muscle being targeted; cues for slow and controlled motion both concentrically and eccentrically; cueing for good starting position and posture,  and to ensure safe performance of motion   Ther Proc 1 - Details discussed at length HEP and that symptoms are the guide as to whether an exercise is irritating or not. He is able to do current program without increased symptoms right LE, and discussed any peripherilization of symptoms should not occur with exercises.   PTRx Ther Proc 8 Side Plank Modified Knees   PTRx Ther Proc 8 - Details x 10-15 sec then fatigue. can gradually increase as able   PTRx Ther Proc 9 Standing Fire Hydrant without Support   PTRx Ther Proc 9 - Details yellow band x 10 work on progressing with reps and to red band as able   Education   Learner/Method Patient;Pictures/Video;No Barriers to Learning   Education Comments pt prefers handouts for HEP   Plan   Home program see PTRX   Updates to plan of care issued updated handouts for HEP   Plan for next session pt following up with spine specialist 5/3/2024.   Comments   Comments Overall good core strength and good compliance with HEP. Does get some relief with decreased intensity of symptoms with extension and strengthening exercises, but overall no centralization of symptoms. Overall slow progress with PT due to persistance of symptoms.   Total Session Time   Timed Code Treatment Minutes 35   Total Treatment Time (sum of timed and untimed services) 35         PLAN  Continue therapy per current plan of care.  Pt has follow up with physician for further evaluation 5/3/2024    Beginning/End Dates of Progress Note Reporting Period:  02/16/24 to 04/29/2024    Referring Provider:  Ángela Muse

## 2024-05-03 ENCOUNTER — OFFICE VISIT (OUTPATIENT)
Dept: PHYSICAL MEDICINE AND REHAB | Facility: CLINIC | Age: 29
End: 2024-05-03
Payer: COMMERCIAL

## 2024-05-03 VITALS
DIASTOLIC BLOOD PRESSURE: 82 MMHG | SYSTOLIC BLOOD PRESSURE: 134 MMHG | HEART RATE: 74 BPM | WEIGHT: 191.7 LBS | OXYGEN SATURATION: 98 % | BODY MASS INDEX: 30.09 KG/M2 | HEIGHT: 67 IN

## 2024-05-03 DIAGNOSIS — G89.29 CHRONIC RIGHT-SIDED LOW BACK PAIN WITH RIGHT-SIDED SCIATICA: Primary | ICD-10-CM

## 2024-05-03 DIAGNOSIS — M54.41 CHRONIC RIGHT-SIDED LOW BACK PAIN WITH RIGHT-SIDED SCIATICA: Primary | ICD-10-CM

## 2024-05-03 DIAGNOSIS — M54.16 LUMBAR RADICULOPATHY: ICD-10-CM

## 2024-05-03 PROCEDURE — 99214 OFFICE O/P EST MOD 30 MIN: CPT | Performed by: STUDENT IN AN ORGANIZED HEALTH CARE EDUCATION/TRAINING PROGRAM

## 2024-05-03 ASSESSMENT — PAIN SCALES - GENERAL: PAINLEVEL: MILD PAIN (2)

## 2024-05-03 NOTE — LETTER
5/3/2024         RE: Syed Hutchinson  [address]  [place] [facility] [address]        Dear Colleague,    Thank you for referring your patient, Syed Hutchinson, to the [facility]. Please see a copy of my visit note below.    ASSESSMENT:  Syed Hutchinson is a 28 year old adult presents for follow-up evaluation of :         Diagnoses and all orders for this visit:  Chronic right-sided low back pain with right-sided sciatica  -     Adult Neurosurgery  Referral; Future  Lumbar radiculopathy  -     Adult Neurosurgery  Referral; Future       Patient is neurologically intact on exam. No myelopathic or red flag symptoms.    Patient presents for follow-up after right L3-L4 and L4-L5 TFESI with only minimal short-term relief after the injection.  Continues to have right-sided low back and leg symptoms, and at this point has exhausted conservative and interventional treatment options.  Given the only short-term relief with the injection, patient would like to talk to a surgeon to see what their options are.  Patient also requested a medical support letter for an ergonomic chair due to their back pain, which was provided today.    PLAN:  Reviewed spine anatomy and disease process. Discussed diagnosis and treatment options with the patient today. A shared decision making model was used. The patient's values and choices were respected. The following represents what was discussed and decided upon by the provider and the patient.    1. DIAGNOSTIC TESTS  No new imaging orders at this time.    2. PHYSICAL THERAPY  Patient is already performing a home program, and was encouraged to continue doing so.  Patient previously completed 2 rounds of physical therapy, second round was just completed in February 2024.  Discussed the importance of core strengthening, ROM, stretching exercises with the patient and how each of these entities is important in decreasing pain.  Explained to the patient  that the purpose of physical therapy is to teach the patient a home exercise program.  These exercises need to be performed every day in order to decrease pain and prevent future occurrences of pain.  Likened it to brushing one's teeth.      3. MEDICATIONS:  Discussed multiple medication options today with patient. Discussed risks, side effects, and proper use of medications. Patient verbalized understanding.  No new medications ordered at this visit.    4. INTERVENTIONS:  Patient wishes to hold off on injections at this time.  Patient already tried TFESI with only short-term relief    5. OTHER REFERRALS:  No other referrals at this time.    6. FOLLOW-UP  As needed.    Advised patient to call the [facility] if symptoms worsen or you have problems controlling bladder and bowel function.   ______________________________________________________________________    SUBJECTIVE:   Syed Hutchinson  is a 29year old adult who presents today for follow-up evaluation.    Patient reports the right L3-L4 and L4-L5 TFESI yielded some noticeable relief only in the early.  Following the injection, and has not given any long-term benefit.  Pain continues to be in the low back primarily on the right side and going down the right leg to the ankle. Pain continues to be worse with bending and lifting as well as sitting.  No new numbness or paresthesias in the leg, no weakness, no bladder or bowel incontinence reported.    Has been doing home exercises from his last PT sessions.    -Treatment to Date: As above, also using OTC medications    4/12/24 - Leigh - Right L3-L4 and L4-L5 TFESI - short term relief only      Oswestry (MARIO) Questionnaire        3/12/2024    11:57 AM   OSWESTRY DISABILITY INDEX   Count 10   Sum 9   Oswestry Score (%) 18 %       Neck Disability Index:      3/12/2024    11:58 AM   Neck Disability Index (  Seb JOSEPH. and Johnie LEBRON. 1991. All rights reserved.; used with permission)   SECTION 1 - PAIN INTENSITY 0    SECTION 2 - PERSONAL CARE 0   SECTION 3 - LIFTING 0   SECTION 4 - READING 0   SECTION 5 - HEADACHES 0   SECTION 6 - CONCENTRATION 0   SECTION 7 - WORK 0   SECTION 8 - DRIVING 0   SECTION 9 - SLEEPING 0   SECTION 10 - RECREATION 0   Count 10   Sum 0   Raw Score: /50 0   Neck Disability Index Score: (%) 0 %              -Medications:    Current Outpatient Medications   Medication Sig Dispense Refill     emtricitabine-tenofovir (TRUVADA) 200-300 MG per tablet Take 1 tablet by mouth daily 90 tablet 0     finasteride (PROSCAR) 5 MG tablet Take 1 tablet (5 mg) by mouth daily 90 tablet 1     Loratadine (CLARITIN PO)        testosterone cypionate (DEPOTESTOSTERONE) 200 MG/ML injection Inject 0.4 mLs (80 mg) into the muscle once a week 10 mL 0     No current facility-administered medications for this visit.       Allergies   Allergen Reactions     Amoxicillin Rash     hives       Past Medical History:   Diagnosis Date     Depersonalization-derealization disorder (H) 8/20/2018     Depressive disorder      PTSD (post-traumatic stress disorder) 7/12/2018        Patient Active Problem List   Diagnosis     Major depressive disorder, recurrent episode, moderate (H)     Female-to-male transgender person     Unsatisfactory cervical Papanicolaou smear     Chronic right-sided low back pain with right-sided sciatica       Past Surgical History:   Procedure Laterality Date     BREAST SURGERY  2016    double mastectomy     ganglion cyst removal- right  01/2022     TRANSGENDER MASTECTOMY Bilateral 3/21/2022    Procedure: REVISION MASTECTOMY, BILATERAL, SIMPLE WITH BILATERAL NIPPLE REGRAFTING - OnQ pump;  Surgeon: Deandra Busch MD;  Location: [facility]       Family History   Problem Relation Age of Onset     No Known Problems Mother      Depression Father      Anxiety Disorder Father      Hypertension Paternal Grandfather      Hyperlipidemia Paternal Grandfather      Other Cancer Paternal Grandfather         skin     Breast Cancer  "Paternal Grandmother      Depression Sister      Anxiety Disorder Sister      Thyroid Disease Sister        Reviewed past medical, surgical, and family history with patient found on new patient intake packet located in EMR Media tab.     SOCIAL HX: Reviewed    ROS: Specifically negative for bowel/bladder dysfunction, balance changes, headache, dizziness, foot drop, fevers, chills, appetite changes, nausea/vomiting, unexplained weight loss. Otherwise 13 systems reviewed are negative. Please see the patient's intake questionnaire from today for details.    OBJECTIVE:  /82 (BP Location: Left arm, Patient Position: Sitting, Cuff Size: Adult Regular)   Pulse 74   Ht 5' 7\" (1.702 m)   Wt 191 lb 11.2 oz (87 kg)   SpO2 98%   BMI 30.02 kg/m      PHYSICAL EXAMINATION: Reviewed and updated as needed  --CONSTITUTIONAL: Vital signs as above. No acute distress. The patient is well nourished and well groomed.  --PSYCHIATRIC: The patient is awake, alert, oriented to person, place, time and answering questions appropriately with clear speech. Appropriate mood and affect   --RESPIRATORY: Normal rhythm and effort. No abnormal accessory muscle breathing patterns noted.   --GROSS MOTOR: Easily arises from a seated position.  --LUMBAR SPINE: Inspection reveals no evidence of deformity. Range of motion is not limited in flexion, extension, lateral rotation but pain worsens with flexion. Mild tenderness to palpation of lumbar paraspinals, no tenderness in spinous processes.  Facet loading (Doe test) negative, seated SLR positive on the right side  --HIPS: Full range of motion bilaterally.  --LOWER EXTREMITY MOTOR TESTING:  Hip flexion left 5/5, right 5/5  Knee extension left 5/5, right 5/5  Ankle dorsiflexors left 5/5, right 5/5  Ankle plantarflexors left 5/5, right 5/5   Great toe extension left 5/5, right 5/5   Knee flexion left 5/5, right 5/5  --NEUROLOGIC: Sensation to lower extremities is intact bilaterally in L2-S1 " dermatomes.  Negative Schilling's bilaterally. Reflexes intact in BLE, no clonus.  --VASCULAR: Warm upper limbs bilaterally. Capillary refill in the upper extremities is less than 1 second.    RESULTS: Available medical records from [facility] and any other outside records were reviewed today.     Imaging:  Available relevant imaging was personally reviewed and interpreted today. The images were shown to the patient and the findings were explained using a spine model.    3/26/2024 MRI lumbar spine:  IMPRESSION:  1.  Levoconvex scoliosis of the thoracolumbar spine.  2.  Central disc herniation at L3-L4.  3.  No high-grade stenoses.     Again, thank you for allowing me to participate in the care of your patient.        Sincerely,        Rick Leigh MD

## 2024-05-03 NOTE — PROGRESS NOTES
ASSESSMENT:  Syed Hutchinson is a 28 year old adult presents for follow-up evaluation of :         Diagnoses and all orders for this visit:  Chronic right-sided low back pain with right-sided sciatica  -     Adult Neurosurgery  Referral; Future  Lumbar radiculopathy  -     Adult Neurosurgery  Referral; Future       Patient is neurologically intact on exam. No myelopathic or red flag symptoms.    Patient presents for follow-up after right L3-L4 and L4-L5 TFESI with only minimal short-term relief after the injection.  Continues to have right-sided low back and leg symptoms, and at this point has exhausted conservative and interventional treatment options.  Given the only short-term relief with the injection, patient would like to talk to a surgeon to see what their options are.  Patient also requested a medical support letter for an ergonomic chair due to their back pain, which was provided today.    PLAN:  Reviewed spine anatomy and disease process. Discussed diagnosis and treatment options with the patient today. A shared decision making model was used. The patient's values and choices were respected. The following represents what was discussed and decided upon by the provider and the patient.    1. DIAGNOSTIC TESTS  No new imaging orders at this time.    2. PHYSICAL THERAPY  Patient is already performing a home program, and was encouraged to continue doing so.  Patient previously completed 2 rounds of physical therapy, second round was just completed in February 2024.  Discussed the importance of core strengthening, ROM, stretching exercises with the patient and how each of these entities is important in decreasing pain.  Explained to the patient that the purpose of physical therapy is to teach the patient a home exercise program.  These exercises need to be performed every day in order to decrease pain and prevent future occurrences of pain.  Likened it to brushing one's teeth.      3.  MEDICATIONS:  Discussed multiple medication options today with patient. Discussed risks, side effects, and proper use of medications. Patient verbalized understanding.  No new medications ordered at this visit.    4. INTERVENTIONS:  Patient wishes to hold off on injections at this time.  Patient already tried TFESI with only short-term relief    5. OTHER REFERRALS:  No other referrals at this time.    6. FOLLOW-UP  As needed.    Advised patient to call the [facility] Center if symptoms worsen or you have problems controlling bladder and bowel function.   ______________________________________________________________________    SUBJECTIVE:   Syed Hutchinson  is a 29year old adult who presents today for follow-up evaluation.    Patient reports the right L3-L4 and L4-L5 TFESI yielded some noticeable relief only in the early.  Following the injection, and has not given any long-term benefit.  Pain continues to be in the low back primarily on the right side and going down the right leg to the ankle. Pain continues to be worse with bending and lifting as well as sitting.  No new numbness or paresthesias in the leg, no weakness, no bladder or bowel incontinence reported.    Has been doing home exercises from his last PT sessions.    -Treatment to Date: As above, also using OTC medications    4/12/24 - Leigh - Right L3-L4 and L4-L5 TFESI - short term relief only      Oswestry (MARIO) Questionnaire        3/12/2024    11:57 AM   OSWESTRY DISABILITY INDEX   Count 10   Sum 9   Oswestry Score (%) 18 %       Neck Disability Index:      3/12/2024    11:58 AM   Neck Disability Index (  Seb H. and Johnie C. 1991. All rights reserved.; used with permission)   SECTION 1 - PAIN INTENSITY 0   SECTION 2 - PERSONAL CARE 0   SECTION 3 - LIFTING 0   SECTION 4 - READING 0   SECTION 5 - HEADACHES 0   SECTION 6 - CONCENTRATION 0   SECTION 7 - WORK 0   SECTION 8 - DRIVING 0   SECTION 9 - SLEEPING 0   SECTION 10 - RECREATION 0   Count 10   Sum 0    Raw Score: /50 0   Neck Disability Index Score: (%) 0 %              -Medications:    Current Outpatient Medications   Medication Sig Dispense Refill    emtricitabine-tenofovir (TRUVADA) 200-300 MG per tablet Take 1 tablet by mouth daily 90 tablet 0    finasteride (PROSCAR) 5 MG tablet Take 1 tablet (5 mg) by mouth daily 90 tablet 1    Loratadine (CLARITIN PO)       testosterone cypionate (DEPOTESTOSTERONE) 200 MG/ML injection Inject 0.4 mLs (80 mg) into the muscle once a week 10 mL 0     No current facility-administered medications for this visit.       Allergies   Allergen Reactions    Amoxicillin Rash     hives       Past Medical History:   Diagnosis Date    Depersonalization-derealization disorder (H) 8/20/2018    Depressive disorder     PTSD (post-traumatic stress disorder) 7/12/2018        Patient Active Problem List   Diagnosis    Major depressive disorder, recurrent episode, moderate (H)    Female-to-male transgender person    Unsatisfactory cervical Papanicolaou smear    Chronic right-sided low back pain with right-sided sciatica       Past Surgical History:   Procedure Laterality Date    BREAST SURGERY  2016    double mastectomy    ganglion cyst removal- right  01/2022    TRANSGENDER MASTECTOMY Bilateral 3/21/2022    Procedure: REVISION MASTECTOMY, BILATERAL, SIMPLE WITH BILATERAL NIPPLE REGRAFTING - OnQ pump;  Surgeon: Deandra Busch MD;  Location: [facility]       Family History   Problem Relation Age of Onset    No Known Problems Mother     Depression Father     Anxiety Disorder Father     Hypertension Paternal Grandfather     Hyperlipidemia Paternal Grandfather     Other Cancer Paternal Grandfather         skin    Breast Cancer Paternal Grandmother     Depression Sister     Anxiety Disorder Sister     Thyroid Disease Sister        Reviewed past medical, surgical, and family history with patient found on new patient intake packet located in EMR Media tab.     SOCIAL HX: Reviewed    ROS:  "Specifically negative for bowel/bladder dysfunction, balance changes, headache, dizziness, foot drop, fevers, chills, appetite changes, nausea/vomiting, unexplained weight loss. Otherwise 13 systems reviewed are negative. Please see the patient's intake questionnaire from today for details.    OBJECTIVE:  /82 (BP Location: Left arm, Patient Position: Sitting, Cuff Size: Adult Regular)   Pulse 74   Ht 5' 7\" (1.702 m)   Wt 191 lb 11.2 oz (87 kg)   SpO2 98%   BMI 30.02 kg/m      PHYSICAL EXAMINATION: Reviewed and updated as needed  --CONSTITUTIONAL: Vital signs as above. No acute distress. The patient is well nourished and well groomed.  --PSYCHIATRIC: The patient is awake, alert, oriented to person, place, time and answering questions appropriately with clear speech. Appropriate mood and affect   --RESPIRATORY: Normal rhythm and effort. No abnormal accessory muscle breathing patterns noted.   --GROSS MOTOR: Easily arises from a seated position.  --LUMBAR SPINE: Inspection reveals no evidence of deformity. Range of motion is not limited in flexion, extension, lateral rotation but pain worsens with flexion. Mild tenderness to palpation of lumbar paraspinals, no tenderness in spinous processes.  Facet loading (Doe test) negative, seated SLR positive on the right side  --HIPS: Full range of motion bilaterally.  --LOWER EXTREMITY MOTOR TESTING:  Hip flexion left 5/5, right 5/5  Knee extension left 5/5, right 5/5  Ankle dorsiflexors left 5/5, right 5/5  Ankle plantarflexors left 5/5, right 5/5   Great toe extension left 5/5, right 5/5   Knee flexion left 5/5, right 5/5  --NEUROLOGIC: Sensation to lower extremities is intact bilaterally in L2-S1 dermatomes.  Negative Schilling's bilaterally. Reflexes intact in BLE, no clonus.  --VASCULAR: Warm upper limbs bilaterally. Capillary refill in the upper extremities is less than 1 second.    RESULTS: Available medical records from [facility] and any other outside " records were reviewed today.     Imaging:  Available relevant imaging was personally reviewed and interpreted today. The images were shown to the patient and the findings were explained using a spine model.    3/26/2024 MRI lumbar spine:  IMPRESSION:  1.  Levoconvex scoliosis of the thoracolumbar spine.  2.  Central disc herniation at L3-L4.  3.  No high-grade stenoses.

## 2024-05-03 NOTE — LETTER
To Whom it May Concern:    Syed Hutchinson has been under my care for chronic low back pain and may benefit from an ergonomic chair for their workstation. Our clinic recommends that an ergonomic chair include the following features:    - Adjustable seat height and rake angle  - Adjustable arm rests  - Adjustable lumbar support (height and curve)    Thank you for your consideration.    Rick Leigh MD

## 2024-05-06 NOTE — TELEPHONE ENCOUNTER
SPINE PATIENTS - NEW PROTOCOL PREVISIT    RECORDS RECEIVED FROM: JAMAAL DAUGHERTY   REASON FOR VISIT: CHRONIC RT SIDED LOW BACK PAIN W/ RT SIDED SCIATICA / LUMBAR RADICULOPATHY   PROVIDER: Reji   DATE OF APPT: 05/09/2024   NOTES (FOR ALL VISITS) STATUS DETAILS   OFFICE NOTE from referring provider Internal Referral and notes in chart   OFFICE NOTE from other specialist Internal INJ Lumbar 4/12/24  PT Lumbar last completed 04/29/2024   DISCHARGE SUMMARY from hospital N/A    DISCHARGE REPORT from ER N/A    OPERATIVE REPORT N/A    EMG REPORT N/A    MEDICATION LIST N/A    IMAGING  (FOR ALL VISITS)     MRI (HEAD, NECK, SPINE) Internal MRI Lumbar: 3/26/24   XRAY (SPINE) *NEUROSURGERY* N/A    CT (HEAD, NECK, SPINE) N/A

## 2024-05-07 ENCOUNTER — TELEPHONE (OUTPATIENT)
Dept: NEUROSURGERY | Facility: CLINIC | Age: 29
End: 2024-05-07
Payer: COMMERCIAL

## 2024-05-07 DIAGNOSIS — M54.41 CHRONIC RIGHT-SIDED LOW BACK PAIN WITH RIGHT-SIDED SCIATICA: Primary | ICD-10-CM

## 2024-05-07 DIAGNOSIS — G89.29 CHRONIC RIGHT-SIDED LOW BACK PAIN WITH RIGHT-SIDED SCIATICA: Primary | ICD-10-CM

## 2024-05-07 NOTE — TELEPHONE ENCOUNTER
Date: May 7, 2024  (Provide the date when patient was called)  Provider: OTHER     Provider/Other: needs to k[facility] imaging  (with whom  should patient kalie with)  Reason for out-going call: Other  (Reason patient was contacted)    Detailed message: ldvm for patient to c/b and kalie XR prior to his appt with Reji or do a walk in at [facility] prior

## 2024-05-09 ENCOUNTER — HOSPITAL ENCOUNTER (OUTPATIENT)
Dept: GENERAL RADIOLOGY | Facility: HOSPITAL | Age: 29
Discharge: HOME | End: 2024-05-09
Attending: SURGERY | Admitting: SURGERY
Payer: COMMERCIAL

## 2024-05-09 ENCOUNTER — OFFICE VISIT (OUTPATIENT)
Dept: NEUROSURGERY | Facility: CLINIC | Age: 29
End: 2024-05-09
Attending: STUDENT IN AN ORGANIZED HEALTH CARE EDUCATION/TRAINING PROGRAM
Payer: COMMERCIAL

## 2024-05-09 ENCOUNTER — PRE VISIT (OUTPATIENT)
Dept: NEUROSURGERY | Facility: CLINIC | Age: 29
End: 2024-05-09

## 2024-05-09 VITALS
DIASTOLIC BLOOD PRESSURE: 95 MMHG | SYSTOLIC BLOOD PRESSURE: 127 MMHG | HEIGHT: 67 IN | HEART RATE: 86 BPM | OXYGEN SATURATION: 98 % | WEIGHT: 191 LBS | BODY MASS INDEX: 29.98 KG/M2

## 2024-05-09 DIAGNOSIS — M54.41 CHRONIC RIGHT-SIDED LOW BACK PAIN WITH RIGHT-SIDED SCIATICA: Primary | ICD-10-CM

## 2024-05-09 DIAGNOSIS — M54.16 LUMBAR RADICULOPATHY: ICD-10-CM

## 2024-05-09 DIAGNOSIS — M54.41 CHRONIC RIGHT-SIDED LOW BACK PAIN WITH RIGHT-SIDED SCIATICA: ICD-10-CM

## 2024-05-09 DIAGNOSIS — G89.29 CHRONIC RIGHT-SIDED LOW BACK PAIN WITH RIGHT-SIDED SCIATICA: ICD-10-CM

## 2024-05-09 DIAGNOSIS — G89.29 CHRONIC RIGHT-SIDED LOW BACK PAIN WITH RIGHT-SIDED SCIATICA: Primary | ICD-10-CM

## 2024-05-09 PROCEDURE — G2211 COMPLEX E/M VISIT ADD ON: HCPCS | Performed by: SURGERY

## 2024-05-09 PROCEDURE — 72110 X-RAY EXAM L-2 SPINE 4/>VWS: CPT

## 2024-05-09 PROCEDURE — 99204 OFFICE O/P NEW MOD 45 MIN: CPT | Performed by: SURGERY

## 2024-05-09 ASSESSMENT — PAIN SCALES - GENERAL: PAINLEVEL: MODERATE PAIN (4)

## 2024-05-09 NOTE — PROGRESS NOTES
NEUROSURGERY CONSULTATION NOTE      Neurosurgery was asked to see this patient by Rick Leigh MD for evaluation of lumbar radiculopathy.       CONSULTATION ASSESSMENT AND PLAN:    28 yo who presents with low back and right leg pain, numbness and weakness.  Lumbar x-ray shows levocurvature from T12-L4.  Does not have any subluxations or dynamic instability.  Also appears to be fairly well-managed between PI and lumbar lordosis.  MRI of the lumbar spine was also reviewed with the patient, there is a central disc protrusion at lumbar 3-4 which creates mild spinal canal stenosis but I do not see any significant nerve impingement at this level due to the disc herniation.  Patient has no other foraminal or spinal canal stenosis at any level.  Recommend an EMG to the right lower extremity.  Also recommend scoliosis x-rays for baseline.  Patient will return after EMG is obtained to discuss findings.     Aida Mendoza MD      HISTORY OF PRESENTING ILLNESS:    28 yo who presents with low back and right leg pain. Occasional left sided symptoms. The right leg symptoms travel into buttocks and into posterolateral leg to the foot. Mostly pain deep aching. Occasional toe numbness on right. Pain limited weakness no focal weakness. No bowel or bladder loss.     Sitting worsens symptoms. Bending forward also worsens symptoms. Physical therapy stretching improves symptoms as well as walking.     PT helps symptoms. No resolution.   4/12/24 - Reees - Right L3-L4 and L4-L5 TFESI - short term relief only 3 days    Past Medical History:   Diagnosis Date    Depersonalization-derealization disorder (H) 8/20/2018    Depressive disorder     PTSD (post-traumatic stress disorder) 7/12/2018       Past Surgical History:   Procedure Laterality Date    BREAST SURGERY  2016    double mastectomy    ganglion cyst removal- right  01/2022    TRANSGENDER MASTECTOMY Bilateral 3/21/2022    Procedure: REVISION MASTECTOMY, BILATERAL, SIMPLE WITH  BILATERAL NIPPLE REGRAFTING - OnQ pump;  Surgeon: Deandra Busch MD;  Location: [facility]       REVIEW OF SYSTEMS:  See ROS form under media     MEDICATIONS:    Current Outpatient Medications   Medication Sig Dispense Refill    emtricitabine-tenofovir (TRUVADA) 200-300 MG per tablet Take 1 tablet by mouth daily 90 tablet 0    finasteride (PROSCAR) 5 MG tablet Take 1 tablet (5 mg) by mouth daily 90 tablet 1    Loratadine (CLARITIN PO)       testosterone cypionate (DEPOTESTOSTERONE) 200 MG/ML injection Inject 0.4 mLs (80 mg) into the muscle once a week 10 mL 0         ALLERGIES/SENSITIVITIES:     Allergies   Allergen Reactions    Amoxicillin Rash     hives       PERTINENT SOCIAL HISTORY:   Social History     Socioeconomic History    Marital status: Single   Tobacco Use    Smoking status: Never    Smokeless tobacco: Never   Vaping Use    Vaping status: Never Used   Substance and Sexual Activity    Alcohol use: Never    Drug use: Never    Sexual activity: Yes     Partners: Male     Birth control/protection: I.U.D.     Comment: my partner is not male, but they do produce sperm   Other Topics Concern    Parent/sibling w/ CABG, MI or angioplasty before 65F 55M? No     Social Determinants of Health     Financial Resource Strain: Low Risk  (11/10/2023)    Financial Resource Strain     Within the past 12 months, have you or your family members you live with been unable to get utilities (heat, electricity) when it was really needed?: No   Food Insecurity: Low Risk  (11/10/2023)    Food Insecurity     Within the past 12 months, did you worry that your food would run out before you got money to buy more?: No     Within the past 12 months, did the food you bought just not last and you didn t have money to get more?: No   Transportation Needs: Low Risk  (11/10/2023)    Transportation Needs     Within the past 12 months, has lack of transportation kept you from medical appointments, getting your medicines, non-medical meetings  "or appointments, work, or from getting things that you need?: No   Physical Activity: Sufficiently Active (4/13/2023)    Exercise Vital Sign     Days of Exercise per Week: 4 days     Minutes of Exercise per Session: 60 min   Stress: No Stress Concern Present (4/13/2023)    [employer] - Occupational Stress Questionnaire     Feeling of Stress : Only a little   Social Connections: Unknown (4/13/2023)    Social Connection and Isolation Panel [NHANES]     Frequency of Communication with Friends and Family: More than three times a week     Frequency of Social Gatherings with Friends and Family: More than three times a week     Attends Scientology Services: Never     Active Member of Clubs or Organizations: Yes     Marital Status: Patient declined   Housing Stability: Low Risk  (11/10/2023)    Housing Stability     Do you have housing? : Yes     Are you worried about losing your housing?: No         FAMILY HISTORY:  Family History   Problem Relation Age of Onset    No Known Problems Mother     Depression Father     Anxiety Disorder Father     Hypertension Paternal Grandfather     Hyperlipidemia Paternal Grandfather     Other Cancer Paternal Grandfather         skin    Breast Cancer Paternal Grandmother     Depression Sister     Anxiety Disorder Sister     Thyroid Disease Sister         PHYSICAL EXAM:   Constitutional: BP (!) 127/95   Pulse 86   Ht 1.702 m (5' 7\")   Wt 86.6 kg (191 lb)   SpO2 98%   BMI 29.91 kg/m       Mental Status: A & O in no acute distress.  Affect is appropriate.  Speech is fluent.  Recent and remote memory are intact.  Attention span and concentration are normal.     Motor: Normal bulk and tone all muscle groups of lower extremities.    Strength: 5/5 x 4    Sensory: Sensation intact bilaterally to light touch.     Coordination;  Heel/toe gait intact.  Normal gait and station.     Reflexes;  knee/ ankle jerk intact 2+.     IMAGING:  I personally reviewed all " radiographic images         Cc:   Melia Ortiz

## 2024-05-09 NOTE — PATIENT INSTRUCTIONS
Recommend scoliosis xray and EMG of right leg    Follow up in clinic vs telephone visit after completion of tests

## 2024-05-09 NOTE — LETTER
5/9/2024         RE: Syed Hutchinson  [address]  [facility] [address]        Dear Colleague,    Thank you for referring your patient, Syed Hutchinson, to the [facility]ERY. Please see a copy of my visit note below.    NEUROSURGERY CONSULTATION NOTE      Neurosurgery was asked to see this patient by Rick Leigh MD for evaluation of lumbar radiculopathy.       CONSULTATION ASSESSMENT AND PLAN:    30 yo who presents with low back and right leg pain, numbness and weakness.  Lumbar x-ray shows levocurvature from T12-L4.  Does not have any subluxations or dynamic instability.  Also appears to be fairly well-managed between PI and lumbar lordosis.  MRI of the lumbar spine was also reviewed with the patient, there is a central disc protrusion at lumbar 3-4 which creates mild spinal canal stenosis but I do not see any significant nerve impingement at this level due to the disc herniation.  Patient has no other foraminal or spinal canal stenosis at any level.  Recommend an EMG to the right lower extremity.  Also recommend scoliosis x-rays for baseline.  Patient will return after EMG is obtained to discuss findings.     Aida Mendoza MD      HISTORY OF PRESENTING ILLNESS:    30 yo who presents with low back and right leg pain. Occasional left sided symptoms. The right leg symptoms travel into buttocks and into posterolateral leg to the foot. Mostly pain deep aching. Occasional toe numbness on right. Pain limited weakness no focal weakness. No bowel or bladder loss.     Sitting worsens symptoms. Bending forward also worsens symptoms. Physical therapy stretching improves symptoms as well as walking.     PT helps symptoms. No resolution.   4/12/24 - Reese - Right L3-L4 and L4-L5 TFESI - short term relief only 3 days    Past Medical History:   Diagnosis Date     Depersonalization-derealization disorder (H) 8/20/2018     Depressive disorder      PTSD (post-traumatic stress disorder)  7/12/2018       Past Surgical History:   Procedure Laterality Date     BREAST SURGERY  2016    double mastectomy     ganglion cyst removal- right  01/2022     TRANSGENDER MASTECTOMY Bilateral 3/21/2022    Procedure: REVISION MASTECTOMY, BILATERAL, SIMPLE WITH BILATERAL NIPPLE REGRAFTING - OnQ pump;  Surgeon: Deandra Busch MD;  [facility]       REVIEW OF SYSTEMS:  See ROS form under media     MEDICATIONS:    Current Outpatient Medications   Medication Sig Dispense Refill     emtricitabine-tenofovir (TRUVADA) 200-300 MG per tablet Take 1 tablet by mouth daily 90 tablet 0     finasteride (PROSCAR) 5 MG tablet Take 1 tablet (5 mg) by mouth daily 90 tablet 1     Loratadine (CLARITIN PO)        testosterone cypionate (DEPOTESTOSTERONE) 200 MG/ML injection Inject 0.4 mLs (80 mg) into the muscle once a week 10 mL 0         ALLERGIES/SENSITIVITIES:     Allergies   Allergen Reactions     Amoxicillin Rash     hives       PERTINENT SOCIAL HISTORY:   Social History     Socioeconomic History     Marital status: Single   Tobacco Use     Smoking status: Never     Smokeless tobacco: Never   Vaping Use     Vaping status: Never Used   Substance and Sexual Activity     Alcohol use: Never     Drug use: Never     Sexual activity: Yes     Partners: Male     Birth control/protection: I.U.D.     Comment: my partner is not male, but they do produce sperm   Other Topics Concern     Parent/sibling w/ CABG, MI or angioplasty before 65F 55M? No     Social Determinants of Health     Financial Resource Strain: Low Risk  (11/10/2023)    Financial Resource Strain      Within the past 12 months, have you or your family members you live with been unable to get utilities (heat, electricity) when it was really needed?: No   Food Insecurity: Low Risk  (11/10/2023)    Food Insecurity      Within the past 12 months, did you worry that your food would run out before you got money to buy more?: No      Within the past 12 months, did the food  "you bought just not last and you didn t have money to get more?: No   Transportation Needs: Low Risk  (11/10/2023)    Transportation Needs      Within the past 12 months, has lack of transportation kept you from medical appointments, getting your medicines, non-medical meetings or appointments, work, or from getting things that you need?: No   Physical Activity: Sufficiently Active (4/13/2023)    Exercise Vital Sign      Days of Exercise per Week: 4 days      Minutes of Exercise per Session: 60 min   Stress: No Stress Concern Present (4/13/2023)    [employer] - Occupational Stress Questionnaire      Feeling of Stress : Only a little   Social Connections: Unknown (4/13/2023)    Social Connection and Isolation Panel [NHANES]      Frequency of Communication with Friends and Family: More than three times a week      Frequency of Social Gatherings with Friends and Family: More than three times a week      Attends [employer] Services: Never      Active Member of Clubs or Organizations: Yes      Marital Status: Patient declined   Housing Stability: Low Risk  (11/10/2023)    Housing Stability      Do you have housing? : Yes      Are you worried about losing your housing?: No         FAMILY HISTORY:  Family History   Problem Relation Age of Onset     No Known Problems Mother      Depression Father      Anxiety Disorder Father      Hypertension Paternal Grandfather      Hyperlipidemia Paternal Grandfather      Other Cancer Paternal Grandfather         skin     Breast Cancer Paternal Grandmother      Depression Sister      Anxiety Disorder Sister      Thyroid Disease Sister         PHYSICAL EXAM:   Constitutional: BP (!) 127/95   Pulse 86   Ht 1.702 m (5' 7\")   Wt 86.6 kg (191 lb)   SpO2 98%   BMI 29.91 kg/m       Mental Status: A & O in no acute distress.  Affect is appropriate.  Speech is fluent.  Recent and remote memory are intact.  Attention span and concentration are normal.     Motor: " Normal bulk and tone all muscle groups of lower extremities.    Strength: 5/5 x 4    Sensory: Sensation intact bilaterally to light touch.     Coordination;  Heel/toe gait intact.  Normal gait and station.     Reflexes;  knee/ ankle jerk intact 2+.     IMAGING:  I personally reviewed all radiographic images         Cc:   Mleia Ortiz             Again, thank you for allowing me to participate in the care of your patient.        Sincerely,        Aida Mendoza MD

## 2024-05-10 ENCOUNTER — MYC MEDICAL ADVICE (OUTPATIENT)
Dept: NEUROSURGERY | Facility: CLINIC | Age: 29
End: 2024-05-10
Payer: COMMERCIAL

## 2024-05-10 NOTE — TELEPHONE ENCOUNTER
No specific restrictions however I would agree that avoiding cupping over any area where needle was inserted would be a good idea for 24 hours or so.  Rescheduling the session for another day might make it more effective

## 2024-05-20 ENCOUNTER — THERAPY VISIT (OUTPATIENT)
Dept: PHYSICAL THERAPY | Facility: CLINIC | Age: 29
End: 2024-05-20
Payer: COMMERCIAL

## 2024-05-20 DIAGNOSIS — M54.41 CHRONIC RIGHT-SIDED LOW BACK PAIN WITH RIGHT-SIDED SCIATICA: Primary | ICD-10-CM

## 2024-05-20 DIAGNOSIS — G89.29 CHRONIC RIGHT-SIDED LOW BACK PAIN WITH RIGHT-SIDED SCIATICA: Primary | ICD-10-CM

## 2024-05-20 PROCEDURE — 97110 THERAPEUTIC EXERCISES: CPT | Mod: GP

## 2024-05-20 PROCEDURE — 97530 THERAPEUTIC ACTIVITIES: CPT | Mod: GP

## 2024-05-22 ENCOUNTER — ANCILLARY PROCEDURE (OUTPATIENT)
Dept: GENERAL RADIOLOGY | Facility: CLINIC | Age: 29
End: 2024-05-22
Attending: SURGERY
Payer: COMMERCIAL

## 2024-05-22 DIAGNOSIS — M54.41 CHRONIC RIGHT-SIDED LOW BACK PAIN WITH RIGHT-SIDED SCIATICA: ICD-10-CM

## 2024-05-22 DIAGNOSIS — M54.16 LUMBAR RADICULOPATHY: ICD-10-CM

## 2024-05-22 DIAGNOSIS — G89.29 CHRONIC RIGHT-SIDED LOW BACK PAIN WITH RIGHT-SIDED SCIATICA: ICD-10-CM

## 2024-05-22 PROCEDURE — 72082 X-RAY EXAM ENTIRE SPI 2/3 VW: CPT | Performed by: STUDENT IN AN ORGANIZED HEALTH CARE EDUCATION/TRAINING PROGRAM

## 2024-05-23 ENCOUNTER — OFFICE VISIT (OUTPATIENT)
Dept: PHYSICAL MEDICINE AND REHAB | Facility: CLINIC | Age: 29
End: 2024-05-23
Attending: SURGERY
Payer: COMMERCIAL

## 2024-05-23 DIAGNOSIS — G89.29 CHRONIC RIGHT-SIDED LOW BACK PAIN WITH RIGHT-SIDED SCIATICA: ICD-10-CM

## 2024-05-23 DIAGNOSIS — M54.16 LUMBAR RADICULOPATHY: ICD-10-CM

## 2024-05-23 DIAGNOSIS — M54.41 CHRONIC RIGHT-SIDED LOW BACK PAIN WITH RIGHT-SIDED SCIATICA: ICD-10-CM

## 2024-05-23 PROCEDURE — 95886 MUSC TEST DONE W/N TEST COMP: CPT | Performed by: PHYSICAL MEDICINE & REHABILITATION

## 2024-05-23 PROCEDURE — 95909 NRV CNDJ TST 5-6 STUDIES: CPT | Performed by: PHYSICAL MEDICINE & REHABILITATION

## 2024-05-23 NOTE — LETTER
5/23/2024         RE: Syed Hutchinson  [address]  [facility] [address]        Dear Colleague,    Thank you for referring your patient, Syed Hutchinson, to the [facility] SPINE AND NEUROSURGERY. Please see a copy of my visit note below.    [facility]  [address]  [place] [address]  Office: [phone] Fax: [phone]    Electromyography and Nerve Conduction Study Report        Indication: Patient presents at the request of Dr. Mendoza for right lower extremity EMG.  They have low back pain with right lower extremity pain since a weakness to the posterior lateral right leg to the lateral calf.  Some pain in the left but 90+ percent in the right leg.  On exam normal sensation to light touch through the lower extremities, 2+ patellar and Achilles reflexes with downgoing toes, and normal muscle strength throughout the major muscle groups of the bilateral lower extremities.      Pt Exam Discussion (Communication Barriers):  Electromyography and nerve conduction testing, including associated discomfort, risks, benefits, and alternatives was discussed with the patient prior to the procedure.  No learning/ communication barriers; patient verbalized understanding of procedure.  Informed consent was obtained.           Pt Assessment:  Testing was successfully completed; patient tolerated testing well.       Blood Thinners: None Skin Temperature: Warmed 30.3                   EMG/NCS  results:     Nerve Conduction Studies  Motor Sites      Segment Distal Latency Neg. Amp CV F-Latency F-Estimate Comment   Site  (ms) (mV) (m/s) (ms) (ms)    Right Fibular (EDB) Motor   Ankle Ankle-EDB 4.8 7.0       Knee Knee-Ankle 12.9 6.8 51      Right Tibial (AH) Motor   Ankle Ankle-AH 3.0 22.1       Knee Knee-Ankle 11.3 15.7 52        Sensory Sites      Onset Lat Peak Lat Amp CV Comment   Site (ms) (ms) ( V) (m/s)    Right Sural Sensory   B-Ankle 3.1 3.7 8 -      H-Reflex  Sites      M-Lat H Lat H Neg Amp   Site (ms) (ms) (mV)   Left Tibial (Soleus) H-Reflex   Pop Fossa 5.9 31.9 4.7   Right Tibial (Soleus) H-Reflex   Pop Fossa 5.5 31.1 4.4     H-Reflex Sites      Lt. H Lat Rt. H Lat L-R H Lat L-R H Neg Amp   Site (ms) (ms) (ms) Norm (mV)   Tibial (Soleus) H-Reflex   Pop Fossa 31.9 31.1 0.80  < 3.0 0.30       NCS Waveforms:    Motor         Sensory       H-Reflex           Electromyography     Side Muscle Nerve Root Ins Act Fibs Psw Fasc Recrt Dur Amp Poly Comment   Right AntTibialis Dp Br Fibular L4-5 Nml Nml Nml Nml Nml Nml Nml 0    Right Gastroc Tibial S1-2 Nml Nml Nml Nml Nml Nml Nml 0    Right Fibularis Long Sup Br Fibular L5-S1 Nml Nml Nml Nml Nml Nml Nml 0    Right VastusLat Femoral L2-4 Nml Nml Nml Nml Nml Nml Nml 0    Right RectFemoris Femoral L2-4 Nml Nml Nml Nml Nml Nml Nml 0    Right TensorFascLat SupGluteal L4-5, S1 Nml Nml Nml Nml Nml Nml Nml 0          Comment NCS: Normal study  1.  Normal nerve conduction studies right lower extremity.  This includes normal right sural SNAP, peroneal and tibial CMAP's, and symmetric tibial H reflexes.    Comment EMG: Normal study  1.  Normal needle EMG right lower extremity.    Interpretation: Normal study    1. There is no electrodiagnostic evidence of lumbosacral radiculopathy, lumbosacral plexopathy, or focal neuropathy in the right lower extremity.    The testing was completed in its entirety by the physician.      It was our pleasure caring for your patient today, if there any questions or concerns please do not hesitate to contact us.      Again, thank you for allowing me to participate in the care of your patient.        Sincerely,        Javier Bryan DO

## 2024-05-23 NOTE — PROGRESS NOTES
[facility]  [address] [place] [address] [address]  [place] [address]  Office: [phone] Fax: [phone]    Electromyography and Nerve Conduction Study Report        Indication: Patient presents at the request of Dr. Mendoza for right lower extremity EMG.  They have low back pain with right lower extremity pain since a weakness to the posterior lateral right leg to the lateral calf.  Some pain in the left but 90+ percent in the right leg.  On exam normal sensation to light touch through the lower extremities, 2+ patellar and Achilles reflexes with downgoing toes, and normal muscle strength throughout the major muscle groups of the bilateral lower extremities.      Pt Exam Discussion (Communication Barriers):  Electromyography and nerve conduction testing, including associated discomfort, risks, benefits, and alternatives was discussed with the patient prior to the procedure.  No learning/ communication barriers; patient verbalized understanding of procedure.  Informed consent was obtained.           Pt Assessment:  Testing was successfully completed; patient tolerated testing well.       Blood Thinners: None Skin Temperature: Warmed 30.3                   EMG/NCS  results:     Nerve Conduction Studies  Motor Sites      Segment Distal Latency Neg. Amp CV F-Latency F-Estimate Comment   Site  (ms) (mV) (m/s) (ms) (ms)    Right Fibular (EDB) Motor   Ankle Ankle-EDB 4.8 7.0       Knee Knee-Ankle 12.9 6.8 51      Right Tibial (AH) Motor   Ankle Ankle-AH 3.0 22.1       Knee Knee-Ankle 11.3 15.7 52        Sensory Sites      Onset Lat Peak Lat Amp CV Comment   Site (ms) (ms) ( V) (m/s)    Right Sural Sensory   B-Ankle 3.1 3.7 8 -      H-Reflex Sites      M-Lat H Lat H Neg Amp   Site (ms) (ms) (mV)   Left Tibial (Soleus) H-Reflex   Pop Fossa 5.9 31.9 4.7   Right Tibial (Soleus) H-Reflex   Pop Fossa 5.5 31.1 4.4     H-Reflex Sites      Lt. H Lat Rt. H Lat L-R H Lat L-R H Neg Amp   Site (ms) (ms)  (ms) Norm (mV)   Tibial (Soleus) H-Reflex   Pop Fossa 31.9 31.1 0.80  < 3.0 0.30       NCS Waveforms:    Motor         Sensory       H-Reflex           Electromyography     Side Muscle Nerve Root Ins Act Fibs Psw Fasc Recrt Dur Amp Poly Comment   Right AntTibialis Dp Br Fibular L4-5 Nml Nml Nml Nml Nml Nml Nml 0    Right Gastroc Tibial S1-2 Nml Nml Nml Nml Nml Nml Nml 0    Right Fibularis Long Sup Br Fibular L5-S1 Nml Nml Nml Nml Nml Nml Nml 0    Right VastusLat Femoral L2-4 Nml Nml Nml Nml Nml Nml Nml 0    Right RectFemoris Femoral L2-4 Nml Nml Nml Nml Nml Nml Nml 0    Right TensorFascLat SupGluteal L4-5, S1 Nml Nml Nml Nml Nml Nml Nml 0          Comment NCS: Normal study  1.  Normal nerve conduction studies right lower extremity.  This includes normal right sural SNAP, peroneal and tibial CMAP's, and symmetric tibial H reflexes.    Comment EMG: Normal study  1.  Normal needle EMG right lower extremity.    Interpretation: Normal study    1. There is no electrodiagnostic evidence of lumbosacral radiculopathy, lumbosacral plexopathy, or focal neuropathy in the right lower extremity.    The testing was completed in its entirety by the physician.      It was our pleasure caring for your patient today, if there any questions or concerns please do not hesitate to contact us.

## 2024-05-23 NOTE — PATIENT INSTRUCTIONS
Thank you for choosing the [facility] for your EMG testing.    The ordering provider will receive your final EMG results within the next few days.  Please follow up with your provider for the results and further treatment recommendations.

## 2024-05-25 ENCOUNTER — NURSE TRIAGE (OUTPATIENT)
Dept: NURSING | Facility: CLINIC | Age: 29
End: 2024-05-25
Payer: COMMERCIAL

## 2024-05-25 NOTE — TELEPHONE ENCOUNTER
Nurse Triage SBAR    Is this a 2nd Level Triage? NO    Situation: low back pain    Background: 2 herniated disks in lumbar spine and currently getting physical therapy, has had back injury about a year ago in the same location. Chronic sciatica in right leg.    Assessment: Today was putting on socks and bending forward and felt and heard a snap.  Can stand but can't bend forward. No ice or pain meds yet taken.  Rates pain 7-8/10    Protocol Recommended Disposition:   Home Care    Recommendation:   Home care advice given per protocol.  Recommend treating at home at this time and calling back if new or worsening symptoms occur.  Patient verbalized understanding information.       Does the patient meet one of the following criteria for ADS visit consideration? 16+ years old, with an [facility] PCP     TIP  Providers, please consider if this condition is appropriate for management at one of our Acute and Diagnostic Services sites.     If patient is a good candidate, please use dotphrase <dot>triageresponse and select Refer to ADS to document.      Reason for Disposition   Caused by a twisting, bending, or lifting injury    Additional Information   Negative: Passed out (i.e., lost consciousness, collapsed and was not responding)   Negative: Shock suspected (e.g., cold/pale/clammy skin, too weak to stand, low BP, rapid pulse)   Negative: Sounds like a life-threatening emergency to the triager   Negative: [1] SEVERE back pain (e.g., excruciating) AND [2] sudden onset AND [3] age > 60 years   Negative: [1] Unable to urinate (or only a few drops) > 4 hours AND [2] bladder feels very full (e.g., palpable bladder or strong urge to urinate)   Negative: [1] Loss of bladder or bowel control (urine or bowel incontinence; wetting self, leaking stool) AND [2] new-onset   Negative: Numbness in groin or rectal area (i.e., loss of sensation)   Negative: [1] SEVERE abdominal pain AND [2] present > 1 hour   Negative: [1] Abdominal pain AND  "[2] age > 60 years   Negative: Weakness of a leg or foot (e.g., unable to bear weight, dragging foot)   Negative: Unable to walk   Negative: Patient sounds very sick or weak to the triager   Negative: [1] SEVERE back pain (e.g., excruciating, unable to do any normal activities) AND [2] not improved 2 hours after pain medicine   Negative: [1] Pain radiates into the thigh or further down the leg AND [2] both legs   Negative: [1] Fever > 100.0 F (37.8 C) AND [2] flank pain (i.e., in side, below ribs and above hip)   Negative: [1] Pain or burning with passing urine (urination) AND [2] flank pain (i.e., in side, below ribs and above hip)   Negative: Numbness in a leg or foot (i.e., loss of sensation)   Negative: [1] Numbness in an arm or hand (i.e., loss of sensation) AND [2] upper back pain   Negative: High-risk adult (e.g., history of cancer, HIV, or IV drug use)   Negative: Soft tissue infection (e.g., abscess, cellulitis) or other serious infection (e.g., bacteremia) in last 2 weeks   Negative: [1] Fever AND [2] no symptoms of UTI  (Exception: Has generalized muscle pains, not localized back pain.)   Negative: Rash in same area as pain (may be described as \"small blisters\")   Negative: Blood in urine (red, pink, or tea-colored)   Negative: [1] MODERATE back pain (e.g., interferes with normal activities) AND [2] present > 3 days   Negative: [1] Pain radiates into the thigh or further down the leg AND [2] one leg   Negative: [1] Age > 50 AND [2] no history of prior similar back pain   Negative: Back pain present > 2 weeks   Negative: Back pain is a chronic symptom (recurrent or ongoing AND present > 4 weeks)    Protocols used: Back Pain-A-AH    "

## 2024-05-27 SDOH — HEALTH STABILITY: PHYSICAL HEALTH: ON AVERAGE, HOW MANY DAYS PER WEEK DO YOU ENGAGE IN MODERATE TO STRENUOUS EXERCISE (LIKE A BRISK WALK)?: 3 DAYS

## 2024-05-27 SDOH — HEALTH STABILITY: PHYSICAL HEALTH: ON AVERAGE, HOW MANY MINUTES DO YOU ENGAGE IN EXERCISE AT THIS LEVEL?: 30 MIN

## 2024-05-27 ASSESSMENT — PATIENT HEALTH QUESTIONNAIRE - PHQ9
SUM OF ALL RESPONSES TO PHQ QUESTIONS 1-9: 0
SUM OF ALL RESPONSES TO PHQ QUESTIONS 1-9: 0
10. IF YOU CHECKED OFF ANY PROBLEMS, HOW DIFFICULT HAVE THESE PROBLEMS MADE IT FOR YOU TO DO YOUR WORK, TAKE CARE OF THINGS AT HOME, OR GET ALONG WITH OTHER PEOPLE: NOT DIFFICULT AT ALL

## 2024-05-27 ASSESSMENT — SOCIAL DETERMINANTS OF HEALTH (SDOH): HOW OFTEN DO YOU GET TOGETHER WITH FRIENDS OR RELATIVES?: THREE TIMES A WEEK

## 2024-05-28 ENCOUNTER — OFFICE VISIT (OUTPATIENT)
Dept: PEDIATRICS | Facility: CLINIC | Age: 29
End: 2024-05-28
Payer: COMMERCIAL

## 2024-05-28 ENCOUNTER — VIRTUAL VISIT (OUTPATIENT)
Dept: NEUROSURGERY | Facility: CLINIC | Age: 29
End: 2024-05-28
Payer: COMMERCIAL

## 2024-05-28 VITALS
HEART RATE: 79 BPM | TEMPERATURE: 98.3 F | HEIGHT: 68 IN | SYSTOLIC BLOOD PRESSURE: 134 MMHG | RESPIRATION RATE: 16 BRPM | DIASTOLIC BLOOD PRESSURE: 85 MMHG | BODY MASS INDEX: 29.28 KG/M2 | WEIGHT: 193.2 LBS | OXYGEN SATURATION: 99 %

## 2024-05-28 DIAGNOSIS — Z12.4 CERVICAL CANCER SCREENING: ICD-10-CM

## 2024-05-28 DIAGNOSIS — F33.42 RECURRENT MAJOR DEPRESSIVE DISORDER, IN FULL REMISSION (H): ICD-10-CM

## 2024-05-28 DIAGNOSIS — L64.9 MALE PATTERN BALDNESS: ICD-10-CM

## 2024-05-28 DIAGNOSIS — Z00.00 ROUTINE GENERAL MEDICAL EXAMINATION AT A HEALTH CARE FACILITY: Primary | ICD-10-CM

## 2024-05-28 DIAGNOSIS — M54.41 CHRONIC RIGHT-SIDED LOW BACK PAIN WITH RIGHT-SIDED SCIATICA: Primary | ICD-10-CM

## 2024-05-28 DIAGNOSIS — G89.29 CHRONIC RIGHT-SIDED LOW BACK PAIN WITH RIGHT-SIDED SCIATICA: Primary | ICD-10-CM

## 2024-05-28 DIAGNOSIS — Z78.9 FEMALE-TO-MALE TRANSGENDER PERSON: ICD-10-CM

## 2024-05-28 DIAGNOSIS — F32.A DEPRESSIVE DISORDER IN REMISSION: ICD-10-CM

## 2024-05-28 PROBLEM — F33.1 MAJOR DEPRESSIVE DISORDER, RECURRENT EPISODE, MODERATE (H): Status: RESOLVED | Noted: 2018-07-12 | Resolved: 2024-05-28

## 2024-05-28 LAB
ALBUMIN SERPL BCG-MCNC: 4.7 G/DL (ref 3.5–5.2)
ALP SERPL-CCNC: 62 U/L (ref 40–150)
ALT SERPL W P-5'-P-CCNC: 26 U/L (ref 0–70)
ANION GAP SERPL CALCULATED.3IONS-SCNC: 10 MMOL/L (ref 7–15)
AST SERPL W P-5'-P-CCNC: 21 U/L (ref 0–45)
BILIRUB SERPL-MCNC: 0.9 MG/DL
BUN SERPL-MCNC: 14.8 MG/DL (ref 6–20)
CALCIUM SERPL-MCNC: 9.4 MG/DL (ref 8.6–10)
CHLORIDE SERPL-SCNC: 103 MMOL/L (ref 98–107)
CHOLEST SERPL-MCNC: 186 MG/DL
CREAT SERPL-MCNC: 0.9 MG/DL (ref 0.51–1.17)
DEPRECATED HCO3 PLAS-SCNC: 26 MMOL/L (ref 22–29)
EGFRCR SERPLBLD CKD-EPI 2021: 88 ML/MIN/1.73M2
ERYTHROCYTE [DISTWIDTH] IN BLOOD BY AUTOMATED COUNT: 12.2 % (ref 10–15)
FASTING STATUS PATIENT QL REPORTED: ABNORMAL
FASTING STATUS PATIENT QL REPORTED: NORMAL
GLUCOSE SERPL-MCNC: 93 MG/DL (ref 70–99)
HCT VFR BLD AUTO: 49.1 % (ref 35–53)
HDLC SERPL-MCNC: 36 MG/DL
HGB BLD-MCNC: 16.7 G/DL (ref 11.7–17.7)
HIV 1+2 AB+HIV1 P24 AG SERPL QL IA: NONREACTIVE
LDLC SERPL CALC-MCNC: 107 MG/DL
MCH RBC QN AUTO: 29.9 PG (ref 26.5–33)
MCHC RBC AUTO-ENTMCNC: 34 G/DL (ref 31.5–36.5)
MCV RBC AUTO: 88 FL (ref 78–100)
NONHDLC SERPL-MCNC: 150 MG/DL
PLATELET # BLD AUTO: 202 10E3/UL (ref 150–450)
POTASSIUM SERPL-SCNC: 4.5 MMOL/L (ref 3.4–5.3)
PROT SERPL-MCNC: 7.2 G/DL (ref 6.4–8.3)
RBC # BLD AUTO: 5.59 10E6/UL (ref 3.8–5.9)
SODIUM SERPL-SCNC: 139 MMOL/L (ref 135–145)
TRIGL SERPL-MCNC: 214 MG/DL
WBC # BLD AUTO: 4.3 10E3/UL (ref 4–11)

## 2024-05-28 PROCEDURE — 90746 HEPB VACCINE 3 DOSE ADULT IM: CPT | Performed by: NURSE PRACTITIONER

## 2024-05-28 PROCEDURE — 36415 COLL VENOUS BLD VENIPUNCTURE: CPT | Performed by: NURSE PRACTITIONER

## 2024-05-28 PROCEDURE — 90471 IMMUNIZATION ADMIN: CPT | Performed by: NURSE PRACTITIONER

## 2024-05-28 PROCEDURE — 80053 COMPREHEN METABOLIC PANEL: CPT | Performed by: NURSE PRACTITIONER

## 2024-05-28 PROCEDURE — 90651 9VHPV VACCINE 2/3 DOSE IM: CPT | Performed by: NURSE PRACTITIONER

## 2024-05-28 PROCEDURE — 87389 HIV-1 AG W/HIV-1&-2 AB AG IA: CPT | Performed by: NURSE PRACTITIONER

## 2024-05-28 PROCEDURE — 99395 PREV VISIT EST AGE 18-39: CPT | Mod: 25 | Performed by: NURSE PRACTITIONER

## 2024-05-28 PROCEDURE — 99213 OFFICE O/P EST LOW 20 MIN: CPT | Mod: 25 | Performed by: NURSE PRACTITIONER

## 2024-05-28 PROCEDURE — 80061 LIPID PANEL: CPT | Performed by: NURSE PRACTITIONER

## 2024-05-28 PROCEDURE — 85027 COMPLETE CBC AUTOMATED: CPT | Performed by: NURSE PRACTITIONER

## 2024-05-28 PROCEDURE — 99441 PR PHYSICIAN TELEPHONE EVALUATION 5-10 MIN: CPT | Performed by: SURGERY

## 2024-05-28 PROCEDURE — 90472 IMMUNIZATION ADMIN EACH ADD: CPT | Performed by: NURSE PRACTITIONER

## 2024-05-28 RX ORDER — FINASTERIDE 5 MG/1
5 TABLET, FILM COATED ORAL DAILY
Qty: 90 TABLET | Refills: 1 | Status: SHIPPED | OUTPATIENT
Start: 2024-05-28

## 2024-05-28 ASSESSMENT — PAIN SCALES - GENERAL: PAINLEVEL: MODERATE PAIN (5)

## 2024-05-28 NOTE — PATIENT INSTRUCTIONS
"Preventive Care Advice   This is general advice we often give to help people stay healthy. Your care team may have specific advice just for you. Please talk to your care team about your own preventive care needs.  Lifestyle  Exercise at least 150 minutes each week (30 minutes a day, 5 days a week).  Do muscle strengthening activities 2 days a week. These help control your weight and prevent disease.  No smoking.  Wear sunscreen to prevent skin cancer.  Have your home tested for radon every 2 to 5 years. Radon is a colorless, odorless gas that can harm your lungs. To learn more, go to www.health.[facility].[place]. and search for \"Radon in Homes.\"  Keep guns unloaded and locked up in a safe place like a safe or gun vault, or, use a gun lock and hide the keys. Always lock away bullets separately. To learn more, visit [employer].[place].gov and search for \"safe gun storage.\"  Nutrition  Eat 5 or more servings of fruits and vegetables each day.  Try wheat bread, brown rice and whole grain pasta (instead of white bread, rice, and pasta).  Get enough calcium and vitamin D. Check the label on foods and aim for 100% of the RDA (recommended daily allowance).  Regular exams  Have a dental exam and cleaning every 6 months.  See your health care team every year to talk about:  Any changes in your health.  Any medicines your care team has prescribed.  Preventive care, family planning, and ways to prevent chronic diseases.  Shots (vaccines)   HPV shots (up to age 26), if you've never had them before.  Hepatitis B shots (up to age 59), if you've never had them before.  COVID-19 shot: Get this shot when it's due.  Flu shot: Get a flu shot every year.  Tetanus shot: Get a tetanus shot every 10 years.  Pneumococcal, hepatitis A, and RSV shots: Ask your care team if you need these based on your risk.  Shingles shot (for age 50 and up).  General health tests  Diabetes screening:  Starting at age 35, Get screened for diabetes at least every 3 years.  If " you are younger than age 35, ask your care team if you should be screened for diabetes.  Cholesterol test: At age 39, start having a cholesterol test every 5 years, or more often if advised.  Bone density scan (DEXA): At age 50, ask your care team if you should have this scan for osteoporosis (brittle bones).  Hepatitis C: Get tested at least once in your life.  Abdominal aortic aneurysm screening: Talk to your doctor about having this screening if you:  Have ever smoked; and  Are biologically male; and  Are between the ages of 65 and 75.  STIs (sexually transmitted infections)  Before age 24: Ask your care team if you should be screened for STIs.  After age 24: Get screened for STIs if you're at risk. You are at risk for STIs (including HIV) if:  You are sexually active with more than one person.  You don't use condoms every time.  You or a partner was diagnosed with a sexually transmitted infection.  If you are at risk for HIV, ask about PrEP medicine to prevent HIV.  Get tested for HIV at least once in your life, whether you are at risk for HIV or not.  Cancer screening tests  Cervical cancer screening: If you have a cervix, begin getting regular cervical cancer screening tests at age 21. Most people who have regular screenings with normal results can stop after age 65. Talk about this with your provider.  Breast cancer scan (mammogram): If you've ever had breasts, begin having regular mammograms starting at age 40. This is a scan to check for breast cancer.  Colon cancer screening: It is important to start screening for colon cancer at age 45.  Have a colonoscopy test every 10 years (or more often if you're at risk) Or, ask your provider about stool tests like a FIT test every year or Cologuard test every 3 years.  To learn more about your testing options, visit: www.[employer]/714883.pdf.  For help making a decision, visit: yudith/uw25467.  Prostate cancer screening test: If you have a prostate and are age 55  to 69, ask your provider if you would benefit from a yearly prostate cancer screening test.  Lung cancer screening: If you are a current or former smoker age 50 to 80, ask your care team if ongoing lung cancer screenings are right for you.  For informational purposes only. Not to replace the advice of your health care provider. Copyright   2023 [employer]. All rights reserved. Clinically reviewed by the [facility]. [employer] [id number] - REV 04/24.

## 2024-05-28 NOTE — PROGRESS NOTES
Billable Telephone Visit    30 yo who presents with low back and right leg pain, numbness and weakness.  Lumbar x-ray shows levo curvature from T12-L4.  Does not have any subluxations or dynamic instability.  Also appears to be fairly well-managed between PI and lumbar lordosis.  MRI of the lumbar spine was also reviewed with the patient, there is a central disc protrusion at lumbar 3-4 which creates mild spinal canal stenosis but I do not see any significant nerve impingement at this level due to the disc herniation.  Patient has no other foraminal or spinal canal stenosis at any level.  Recommend an EMG to the right lower extremity.  Also recommend scoliosis x-rays for baseline.  Patient will return after EMG is obtained to discuss findings.     Recent worsening of patient's back pain. This has been improving after initial severe worsening but was very debilitating for the patient. We reviewed results of the testing. EMG was negative for lumbar radiculopathy. Scoliosis films show no significant sagittal imbalance. At this time I do not recommend any surgical intervention given lack of nerve compression and negative EMG. The patient will follow up with spine clinic for symptom management. I have messaged the covering providers since Dr Leigh is out of the office and the patient experiencing worsening pain.     Location of Dr Mendoza: [facility] [place], [place]  Location of patient: [place]  Length of Visit: 8:08    Aida Mendoza MD

## 2024-05-28 NOTE — PROGRESS NOTES
"Preventive Care Visit  [facility] ELBA Caro CNP, Internal Medicine - Pediatrics  May 28, 2024      Assessment & Plan     Routine general medical examination at a health care facility  Declines PREP at this point. Will reach out if/when restart.    Cervical cancer screening  Declined today, will schedule for future    Recurrent major depressive disorder, in full remission (H24)  Marginally stable, having work up via spine for back pain which is unresolved.     Female-to-male transgender person  Stable on current regime, no changes made, last labs reviewed.  - CBC with Platelets; Future  - Lipid panel reflex to direct LDL Non-fasting; Future  - Comprehensive metabolic panel (BMP + Alb, Alk Phos, ALT, AST, Total. Bili, TP); Future  - Lipid panel reflex to direct LDL Fasting  - HIV Antigen Antibody Combo  - CBC with Platelets  - Comprehensive metabolic panel (BMP + Alb, Alk Phos, ALT, AST, Total. Bili, TP)    Male pattern baldness  stable  - finasteride (PROSCAR) 5 MG tablet; Take 1 tablet (5 mg) by mouth daily    Depressive disorder in remission  Per above  - Lipid panel reflex to direct LDL Fasting  - HIV Antigen Antibody Combo            BMI  Estimated body mass index is 29.38 kg/m  as calculated from the following:    Height as of this encounter: 1.727 m (5' 8\").    Weight as of this encounter: 87.6 kg (193 lb 3.2 oz).       Counseling  Appropriate preventive services were discussed with this patient, including applicable screening as appropriate for fall prevention, nutrition, physical activity, Tobacco-use cessation, weight loss and cognition.  Checklist reviewing preventive services available has been given to the patient.  Reviewed patient's diet, addressing concerns and/or questions.   David is at risk for lack of exercise and has been provided with information to increase physical activity for the benefit of David's well-being.           Subjective   David is a 29 year old, " presenting for the following:  Physical (Pt would like to discuss immunizations such as HPV, etc. )        5/28/2024     9:08 AM   Additional Questions   Roomed by TEENA Vance   Accompanied by HELLEN         5/28/2024     9:08 AM   Patient Reported Additional Medications   Patient reports taking the following new medications No        Health Care Directive  Patient has a Health Care Directive on file  Advance care planning document is on file and is current.    Healthy Habits:     Getting at least 3 servings of Calcium per day:  Yes    Bi-annual eye exam:  NO    Dental care twice a year:  Yes    Sleep apnea or symptoms of sleep apnea:  None    Diet:  Regular (no restrictions)    Frequency of exercise:  4-5 days/week    Duration of exercise:  30-45 minutes    Taking medications regularly:  Yes    Barriers to taking medications:  None    Medication side effects:  None    Additional concerns today:  No    Due for pap    Last visit 2 month ago regarding HRT< noted things stable,no concerns. SInce then, notes no concerns.     History of depression, off meds, notes generally controlled, however pain a consideratio      1/20/2022     2:51 PM 4/20/2023     2:31 PM 5/27/2024     6:31 PM   PHQ   PHQ-9 Total Score 3 2 0   Q9: Thoughts of better off dead/self-harm past 2 weeks Not at all Not at all Not at all     Regarding prep, notes not active and no specific exposures of concern              5/27/2024   General Health   How would you rate your overall physical health? Good   Feel stress (tense, anxious, or unable to sleep) Not at all         5/27/2024   Nutrition   Three or more servings of calcium each day? Yes   Diet: Regular (no restrictions)   How many servings of fruit and vegetables per day? (!) 2-3   How many sweetened beverages each day? 0-1         5/27/2024   Exercise   Days per week of moderate/strenous exercise 3 days   Average minutes spent exercising at this level 30 min         5/27/2024   Social Factors    Frequency of gathering with friends or relatives Three times a week   Worry food won't last until get money to buy more No   Food not last or not have enough money for food? No   Do you have housing?  Yes   Are you worried about losing your housing? No   Lack of transportation? No   Unable to get utilities (heat,electricity)? No         5/27/2024   Dental   Dentist two times every year? Yes         5/27/2024   TB Screening   Were you born outside of the [place]? No       Today's PHQ-9 Score:       5/27/2024     6:31 PM   PHQ-9 SCORE   PHQ-9 Total Score MyChart 0   PHQ-9 Total Score 0         5/27/2024   Substance Use   Alcohol more than 3/day or more than 7/wk No   Do you use any other substances recreationally? No     Social History     Tobacco Use    Smoking status: Never     Passive exposure: Never    Smokeless tobacco: Never   Vaping Use    Vaping status: Never Used   Substance Use Topics    Alcohol use: Never    Drug use: Never           4/13/2023   LAST FHS-7 RESULTS   1st degree relative breast or ovarian cancer No   Any relative bilateral breast cancer Unknown   Any male have breast cancer No   Any ONE woman have BOTH breast AND ovarian cancer No   Any woman with breast cancer before 50yrs No   2 or more relatives with breast AND/OR ovarian cancer No   2 or more relatives with breast AND/OR bowel cancer No                5/27/2024   STI Screening   New sexual partner(s) since last STI/HIV test? No     History of abnormal Pap smear: skipping pap today 05/28/24 5/18/2023     1:38 PM   PAP / HPV   PAP Unsatisfactory for evaluation            5/27/2024   Contraception/Family Planning   Questions about contraception or family planning No        Reviewed and updated as needed this visit by Provider     Meds                         Objective    Exam  /85 (BP Location: Right arm, Patient Position: Sitting, Cuff Size: Adult Regular)   Pulse 79   Temp 98.3  F (36.8  C) (Oral)   Resp 16   Ht 1.727 m (5'  "8\")   Wt 87.6 kg (193 lb 3.2 oz)   LMP  (LMP Unknown)   SpO2 99%   BMI 29.38 kg/m     Estimated body mass index is 29.38 kg/m  as calculated from the following:    Height as of this encounter: 1.727 m (5' 8\").    Weight as of this encounter: 87.6 kg (193 lb 3.2 oz).    Physical Exam  GENERAL: alert and no distress  EYES: Eyes grossly normal to inspection, PERRL and conjunctivae and sclerae normal  HENT: ear canals and TM's normal, nose and mouth without ulcers or lesions  NECK: no adenopathy, no asymmetry, masses, or scars  RESP: lungs clear to auscultation - no rales, rhonchi or wheezes  CV: regular rate and rhythm, normal S1 S2, no S3 or S4, no murmur, click or rub, no peripheral edema  MS: no gross musculoskeletal defects noted, no edema        Signed Electronically by: ELBA Jenkins CNP    Answers submitted by the patient for this visit:  Patient Health Questionnaire (Submitted on 5/27/2024)  If you checked off any problems, how difficult have these problems made it for you to do your work, take care of things at home, or get along with other people?: Not difficult at all  PHQ9 TOTAL SCORE: 0    "

## 2024-05-28 NOTE — LETTER
5/28/2024         RE: Syed Hutchinson  [address]  [facility] [address]        Dear Colleague,    Thank you for referring your patient, Syed Hutchinson, to the [facility]. Please see a copy of my visit note below.    Billable Telephone Visit    30 yo who presents with low back and right leg pain, numbness and weakness.  Lumbar x-ray shows levo curvature from T12-L4.  Does not have any subluxations or dynamic instability.  Also appears to be fairly well-managed between PI and lumbar lordosis.  MRI of the lumbar spine was also reviewed with the patient, there is a central disc protrusion at lumbar 3-4 which creates mild spinal canal stenosis but I do not see any significant nerve impingement at this level due to the disc herniation.  Patient has no other foraminal or spinal canal stenosis at any level.  Recommend an EMG to the right lower extremity.  Also recommend scoliosis x-rays for baseline.  Patient will return after EMG is obtained to discuss findings.     Recent worsening of patient's back pain. This has been improving after initial severe worsening but was very debilitating for the patient. We reviewed results of the testing. EMG was negative for lumbar radiculopathy. Scoliosis films show no significant sagittal imbalance. At this time I do not recommend any surgical intervention given lack of nerve compression and negative EMG. The patient will follow up with spine clinic for symptom management. I have messaged the covering providers since Dr Leigh is out of the office and the patient experiencing worsening pain.     Location of Dr Mendoza: [facility] [place], [place]  Location of patient: [place]  Length of Visit: 8:08    Aida Mendoza MD       Again, thank you for allowing me to participate in the care of your patient.        Sincerely,        Aida Mendoza MD

## 2024-06-05 ENCOUNTER — THERAPY VISIT (OUTPATIENT)
Dept: PHYSICAL THERAPY | Facility: CLINIC | Age: 29
End: 2024-06-05
Payer: COMMERCIAL

## 2024-06-05 DIAGNOSIS — G89.29 CHRONIC RIGHT-SIDED LOW BACK PAIN WITH RIGHT-SIDED SCIATICA: Primary | ICD-10-CM

## 2024-06-05 DIAGNOSIS — M54.41 CHRONIC RIGHT-SIDED LOW BACK PAIN WITH RIGHT-SIDED SCIATICA: Primary | ICD-10-CM

## 2024-06-05 PROCEDURE — 97530 THERAPEUTIC ACTIVITIES: CPT | Mod: GP

## 2024-06-05 PROCEDURE — 97140 MANUAL THERAPY 1/> REGIONS: CPT | Mod: GP

## 2024-06-05 PROCEDURE — 97110 THERAPEUTIC EXERCISES: CPT | Mod: GP

## 2024-06-10 ENCOUNTER — MYC MEDICAL ADVICE (OUTPATIENT)
Dept: PHYSICAL MEDICINE AND REHAB | Facility: CLINIC | Age: 29
End: 2024-06-10
Payer: COMMERCIAL

## 2024-06-11 NOTE — TELEPHONE ENCOUNTER
Please advise patient that I am not certain what more we can offer with our tools. If there is concern for central sensitization and chronic pain syndrome, we can provide the names of some pain management clinics in the area that are better equipped for treating chronic pain and central sensitization type pain.    Pain Clinics in the [facility]:    [facility]  [address], [address]  [place] [address]  [phone]    [facility]  Multiple locations in the [facility]  [phone]    [facility]  Multiple locations in the [facility]  [phone]    As far as I know these clinics do not require a referral, and just need to call to set up an evaluation.

## 2024-08-12 NOTE — PROGRESS NOTES
DISCHARGE  Reason for Discharge: Patient has failed to schedule further appointments.    Equipment Issued: none    Discharge Plan: Patient to continue home program.    Referring Provider:  Ángela Muse     06/05/24 0500   Appointment Info   Signing clinician's name / credentials Charo Nunes, PT, DPT, WCS   Total/Authorized Visits 4 + additional 4 for total of 8   Visits Used 7   Medical Diagnosis Chronic right sided low back pain with right sided sciatica; hip pain right   PT Tx Diagnosis Chronic right sided low back pain with right sided sciatica; hip pain right   Progress Note/Certification   Onset of illness/injury or Date of Surgery 06/27/23  (md referral date)   Therapy Frequency every 2 weeks   Predicted Duration 8 weeks   Progress Note Due Date 06/27/24   Progress Note Completed Date 04/29/24   PT Goal 1   Goal Identifier Sitting   Goal Description Minutes patient will be able to sit: 60 min, pain 1/10  for personal hygiene; to allow rest from standing; for job requirements in their work place; for community transportation   Rationale to maximize safety and independence with performance of ADLs and functional tasks;to maximize safety and independence within the home;to maximize safety and independence within the community;to maximize safety and independence with transportation;to maximize safety and independence with self cares   Goal Progress goal not met, continues to have pain with sititng. goal is ongoing, timeframe updated   Target Date 06/24/24   Subjective Report   Subjective Report David reports sig pain putting on socks in standing that exacerbated LBP for several days. Feeling back to baseline now. EMG revealed no neuropathy so cause of their R leg pain remains unknown. Back felt okay after a 5 mile bike ride last  week. They c/o aching, burning pain from lumbar spine into R glute, anterior thigh, and anterolateral shin. Worse w/ sitting, stretches help. They are working w/ massage therapist  as well and this provides some relief, but not lasting   Objective Measures   Objective Measures Objective Measure 1;Objective Measure 2;Objective Measure 3   Objective Measure 1   Objective Measure Hip PROM   Details WNL JOSE, has impingement-type pain w/ hip flexion   Objective Measure 2   Objective Measure Lumbar AROM   Details Flexion fingers to mid-shin, failure to reverse lumbar curve. Extension, rotation, SB all WNL   Objective Measure 3   Objective Measure Palpation   Details Slight decrease in L1-L5 mobility w/ prone PAs, no pain. R sided QL has increased tension and is TTP. Paraspinals WNL JOSE   Treatment Interventions (PT)   Interventions Therapeutic Procedure/Exercise;Therapeutic Activity;Manual Therapy   Therapeutic Procedure/Exercise   Therapeutic Procedures: strength, endurance, ROM, flexibility minutes (35624) 8   Therapeutic Procedures Ther Proc 2   Ther Proc 1 Trial of knee to chest stretch, held d/t hip impingement sx>low back or glute stretch   Ther Proc 2 Verbal review of other HEP pt is doing, encouraged to continue w/ extension-based ex. Try cat-cow as able for lumbar mobility too   PTRx Ther Proc 1 Standing IT Band Stretch Using Wall   PTRx Ther Proc 1 - Details x1 min R side   PTRx Ther Proc 2 Quadratus Lumborum Stretch   PTRx Ther Proc 2 - Details x1 min R side   Patient Response/Progress no adverse effects   Therapeutic Activity   Therapeutic Activities: dynamic activities to improve functional performance minutes (44747) 17   Ther Act 1 Time to reassess back/hip ROM and answer pt questions/educate regarding cause of their pain, what exericses are okay to try, and how to adjust based on pain levels   Ther Act 1 - Details PNE on central sensitization and HEP rationale. Recommended they try some minfullness/meditation daily   Patient Response/Progress All questions answered, verbalized understanding   Manual Therapy   Manual Therapy: Mobilization, MFR, MLD, friction massage minutes (49592) 15    Manual Therapy Manual Therapy 3;Manual Therapy 2   Manual Therapy 1 Skin rolling   Manual Therapy 1 - Details To R anterior thigh and lateral R calf over painful areas x5 min total. Educated pt in how to perform on self at home, could have someone else try over his low back   Skilled Intervention To address myofascial restrictions and joint hypomobility contributing to pain   Patient Response/Progress no adverse effects, demos good understanding   Manual Therapy 2 QL MFR   Manual Therapy 2 - Details x4' to R QL   Manual Therapy 3 Lumbar jt mobs   Manual Therapy 3 - Details Prone PAs L1-L5, Gr II-III x6 min total   Self Care/home Management   PTRx Self Care 1 Abdominal Skin Rolling   PTRx Self Care 1 - Details No Notes   Education   Learner/Method Patient;Pictures/Video;No Barriers to Learning   Education Comments pt prefers handouts for HEP   Plan   Home program printed   Updates to plan of care added in myofascial release/manual for pain relief   Plan for next session Pt may want to review gym exercises to ensure their form is okay   Total Session Time   Timed Code Treatment Minutes 40   Total Treatment Time (sum of timed and untimed services) 40

## 2024-08-24 ENCOUNTER — MYC REFILL (OUTPATIENT)
Dept: PEDIATRICS | Facility: CLINIC | Age: 29
End: 2024-08-24
Payer: COMMERCIAL

## 2024-08-24 DIAGNOSIS — Z78.9 FEMALE-TO-MALE TRANSGENDER PERSON: ICD-10-CM

## 2024-08-24 DIAGNOSIS — L64.9 MALE PATTERN BALDNESS: ICD-10-CM

## 2024-08-26 RX ORDER — TESTOSTERONE CYPIONATE 200 MG/ML
80 INJECTION, SOLUTION INTRAMUSCULAR WEEKLY
Qty: 10 ML | Refills: 0 | Status: SHIPPED | OUTPATIENT
Start: 2024-08-26

## 2024-08-26 RX ORDER — FINASTERIDE 5 MG/1
5 TABLET, FILM COATED ORAL DAILY
Qty: 90 TABLET | Refills: 1 | OUTPATIENT
Start: 2024-08-26

## 2024-11-26 DIAGNOSIS — L64.9 MALE PATTERN BALDNESS: ICD-10-CM

## 2024-11-27 RX ORDER — FINASTERIDE 5 MG/1
5 TABLET, FILM COATED ORAL DAILY
Qty: 90 TABLET | Refills: 1 | Status: SHIPPED | OUTPATIENT
Start: 2024-11-27

## 2025-01-31 ENCOUNTER — MYC MEDICAL ADVICE (OUTPATIENT)
Dept: PEDIATRICS | Facility: CLINIC | Age: 30
End: 2025-01-31

## 2025-02-03 NOTE — TELEPHONE ENCOUNTER
Sent a [employer] message to the patient   - Informed the patient that a prescription for their  testosterone cypionate (DEPOTESTOSTERONE) 200 MG/ML injection (10 mLs with 0 refills) was sent to the [id number] - [place], [place] - [address] AT [address] on 1/31/2025      testosterone cypionate (DEPOTESTOSTERONE) 200 MG/ML injection 10 mL 0 1/31/2025 -- No   Sig - Route: Inject 0.4 mLs (80 mg) into the muscle once a week. - Intramuscular     Nimisha MCDANIELS RN   [facility]

## 2025-03-13 ENCOUNTER — MYC MEDICAL ADVICE (OUTPATIENT)
Dept: PEDIATRICS | Facility: CLINIC | Age: 30
End: 2025-03-13

## 2025-04-12 ENCOUNTER — E-VISIT (OUTPATIENT)
Dept: PEDIATRICS | Facility: CLINIC | Age: 30
End: 2025-04-12
Payer: COMMERCIAL

## 2025-04-12 ENCOUNTER — MYC REFILL (OUTPATIENT)
Dept: PEDIATRICS | Facility: CLINIC | Age: 30
End: 2025-04-12

## 2025-04-12 DIAGNOSIS — G89.29 CHRONIC RIGHT-SIDED LOW BACK PAIN WITH RIGHT-SIDED SCIATICA: ICD-10-CM

## 2025-04-12 DIAGNOSIS — R51.9 NONINTRACTABLE HEADACHE, UNSPECIFIED CHRONICITY PATTERN, UNSPECIFIED HEADACHE TYPE: ICD-10-CM

## 2025-04-12 DIAGNOSIS — Z78.9 FEMALE-TO-MALE TRANSGENDER PERSON: ICD-10-CM

## 2025-04-12 DIAGNOSIS — M54.41 CHRONIC RIGHT-SIDED LOW BACK PAIN WITH RIGHT-SIDED SCIATICA: ICD-10-CM

## 2025-04-14 RX ORDER — TESTOSTERONE CYPIONATE 200 MG/ML
80 INJECTION, SOLUTION INTRAMUSCULAR WEEKLY
Qty: 10 ML | Refills: 0 | Status: SHIPPED | OUTPATIENT
Start: 2025-04-14

## 2025-04-15 RX ORDER — GABAPENTIN 300 MG/1
300 CAPSULE ORAL AT BEDTIME
Qty: 90 CAPSULE | Refills: 0 | Status: SHIPPED | OUTPATIENT
Start: 2025-04-15

## 2025-04-15 NOTE — PATIENT INSTRUCTIONS
Thank you for checking in. I have adjusted your medication to manage your symptoms. The following medication was sent to your pharmacy:    Orders Placed This Encounter   Medications     gabapentin (NEURONTIN) 300 MG capsule     Sig: Take 1 capsule (300 mg) by mouth at bedtime.     Dispense:  90 capsule     Refill:  0          View your full visit summary for details by clicking on the link below. Your pharmacist will be able to address any questions you may have about the medication.       Please send an eVisit to follow up on this medication change in if it's not working, otherwise you can request a refill when due, and I can fill.      Thank you for choosing us for your care.

## 2025-05-29 ENCOUNTER — MYC REFILL (OUTPATIENT)
Dept: PEDIATRICS | Facility: CLINIC | Age: 30
End: 2025-05-29
Payer: COMMERCIAL

## 2025-05-29 DIAGNOSIS — G89.29 CHRONIC RIGHT-SIDED LOW BACK PAIN WITH RIGHT-SIDED SCIATICA: ICD-10-CM

## 2025-05-29 DIAGNOSIS — R51.9 NONINTRACTABLE HEADACHE, UNSPECIFIED CHRONICITY PATTERN, UNSPECIFIED HEADACHE TYPE: ICD-10-CM

## 2025-05-29 DIAGNOSIS — M54.41 CHRONIC RIGHT-SIDED LOW BACK PAIN WITH RIGHT-SIDED SCIATICA: ICD-10-CM

## 2025-05-29 RX ORDER — GABAPENTIN 300 MG/1
300 CAPSULE ORAL AT BEDTIME
Qty: 90 CAPSULE | Refills: 0 | OUTPATIENT
Start: 2025-05-29

## 2025-07-02 ENCOUNTER — MYC REFILL (OUTPATIENT)
Dept: PEDIATRICS | Facility: CLINIC | Age: 30
End: 2025-07-02
Payer: COMMERCIAL

## 2025-07-02 DIAGNOSIS — Z78.9 FEMALE-TO-MALE TRANSGENDER PERSON: ICD-10-CM

## 2025-07-02 RX ORDER — TESTOSTERONE CYPIONATE 200 MG/ML
80 INJECTION, SOLUTION INTRAMUSCULAR WEEKLY
Qty: 10 ML | Refills: 0 | Status: SHIPPED | OUTPATIENT
Start: 2025-07-02

## 2025-07-26 ENCOUNTER — NURSE TRIAGE (OUTPATIENT)
Dept: NURSING | Facility: CLINIC | Age: 30
End: 2025-07-26
Payer: COMMERCIAL

## 2025-07-26 NOTE — TELEPHONE ENCOUNTER
Nurse Triage SBAR    Is this a 2nd Level Triage? NO    Situation:   Spoke with 30 yr old David who c/o:    3 days ago noticed itching and burning when wiping after using the bathroom, between anus and sacrum.  Patient questions if there is a friction wound from wiping.   Reports a small tear or open wound above the anus in gluteal fold area.  Line shaped wound less than one inch and 1/8 to 1/4 inch wide.  Denies bleeding. The area appears pink.  Notices some drainage after wiping, but unsure if this is pus.  Rates pain a 3-4 on a 0-10 pain scale.when wiping with toilet tissue, otherwise the area feels more itchy than painful.  Has been using a bidet more than wiping to help with the discomfort.    Background:   Hx of chronic R back pain with R sciatica    Assessment:   Evaluation needed    Protocol Recommended Disposition:   See PCP Within 24 Hours    Recommendation:   Advised OV within the next 24 hours per protocol.   Care advice given per wound infection suspected guideline.  Patient intends to go to [facility].  Advised to call back if fever or worsening symptoms occur.  Kirti Mohr RN  [facility]    Reason for Disposition   [1] Pus or cloudy fluid draining from wound AND [2] no fever    Additional Information   Negative: [1] Widespread rash AND [2] bright red, sunburn-like AND [3] too weak to stand   Negative: Sounds like a life-threatening emergency to the triager   Negative: [1] Wound infection diagnosed AND [2] taking an antibiotic   Negative: [1] Cellulitis diagnosed AND [2] taking an antibiotic   Negative: Animal bite wound infection suspected   Negative: Boil suspected (painful red lump)   Negative: Impetigo suspected (e.g., infected sore; soft yellow crusts or scabs)   Negative: Surgical  wound infection suspected   Negative: Surgical wound infection suspected (post-op)   Negative: Skin glue used to close wound and not infected   Negative: Stitches (or staples) and not infected    Negative: Chronic wound care and Negative Pressure Wound Therapy (NPWT) symptoms and questions   Negative: SEVERE pain in the wound   Negative: [1] SEVERE pain with bending of finger (or toe) AND [2] wound on hand (or foot)   Negative: [1] Widespread rash AND [2] bright red, sunburn-like   Negative: Fever > 103 F (39.4 C)   Negative: Black (necrotic), dark purple, or blisters develop in area of wound   Negative: Patient sounds very sick or weak to the triager   Negative: [1] Looks infected (spreading redness, red streak, pus) AND [2] fever   Negative: [1] Looks infected (e.g., spreading redness) AND [2] face wound   Negative: [1] Red streak runs from the wound AND [2] longer than 1 inch (2.5 cm)   Negative: [1] Skin around the wound has become red AND [2] larger than 2 inches (5 cm)   Negative: [1] Finger wound AND [2] entire finger swollen   Negative: [1] Looks infected (spreading redness, red streak) AND [2] no fever    Protocols used: Wound Infection [id number]

## 2025-07-27 ENCOUNTER — MYC REFILL (OUTPATIENT)
Dept: PEDIATRICS | Facility: CLINIC | Age: 30
End: 2025-07-27
Payer: COMMERCIAL

## 2025-07-27 DIAGNOSIS — M54.41 CHRONIC RIGHT-SIDED LOW BACK PAIN WITH RIGHT-SIDED SCIATICA: ICD-10-CM

## 2025-07-27 DIAGNOSIS — R51.9 NONINTRACTABLE HEADACHE, UNSPECIFIED CHRONICITY PATTERN, UNSPECIFIED HEADACHE TYPE: ICD-10-CM

## 2025-07-27 DIAGNOSIS — G89.29 CHRONIC RIGHT-SIDED LOW BACK PAIN WITH RIGHT-SIDED SCIATICA: ICD-10-CM

## 2025-07-28 RX ORDER — GABAPENTIN 300 MG/1
300 CAPSULE ORAL AT BEDTIME
Qty: 90 CAPSULE | Refills: 0 | Status: SHIPPED | OUTPATIENT
Start: 2025-07-28

## 2025-08-23 ENCOUNTER — MYC REFILL (OUTPATIENT)
Dept: PEDIATRICS | Facility: CLINIC | Age: 30
End: 2025-08-23
Payer: COMMERCIAL

## 2025-08-23 DIAGNOSIS — Z78.9 FEMALE-TO-MALE TRANSGENDER PERSON: ICD-10-CM

## 2025-08-24 DIAGNOSIS — L64.9 MALE PATTERN BALDNESS: ICD-10-CM

## 2025-08-25 RX ORDER — FINASTERIDE 5 MG/1
5 TABLET, FILM COATED ORAL DAILY
Qty: 90 TABLET | Refills: 1 | Status: SHIPPED | OUTPATIENT
Start: 2025-08-25

## 2025-08-25 RX ORDER — TESTOSTERONE CYPIONATE 200 MG/ML
80 INJECTION, SOLUTION INTRAMUSCULAR WEEKLY
Qty: 10 ML | Refills: 0 | Status: SHIPPED | OUTPATIENT
Start: 2025-08-25

## (undated) DEVICE — LIGHT HANDLE X1 31140133

## (undated) DEVICE — SU SILK 2-0 TIE 12X30" A305H

## (undated) DEVICE — BLADE KNIFE SURG 10 371110

## (undated) DEVICE — DRSG ABDOMINAL 07 1/2X8" 7197D

## (undated) DEVICE — PREP CHLORAPREP 26ML TINTED HI-LITE ORANGE 930815

## (undated) DEVICE — POSITIONER HEAD DONUT FOAM 9" LF FP-HEAD9

## (undated) DEVICE — STPL SKIN 35W ROTATING HEAD PRW35

## (undated) DEVICE — DRAPE LAP TRANSVERSE 29421

## (undated) DEVICE — SYR BULB IRRIG DOVER 60 ML LATEX FREE 67000

## (undated) DEVICE — NDL 18GA 1.5" 305196

## (undated) DEVICE — LIGHT HANDLE X2

## (undated) DEVICE — DRSG TEGADERM 2 3/8X2 3/4" 1624W

## (undated) DEVICE — SOL NACL 0.9% IRRIG 1000ML BOTTLE 2F7124

## (undated) DEVICE — Device

## (undated) DEVICE — SU VICRYL 2-0 CT-1 27" UND J259H

## (undated) DEVICE — POSITIONER ARMBOARD FOAM 1PAIR LF FP-ARMB1

## (undated) DEVICE — SPONGE LAP 18X18" X8435

## (undated) DEVICE — SOL ADH LIQUID BENZOIN SWAB 0.6ML C1544

## (undated) DEVICE — PAD CHUX UNDERPAD 30X36" P3036C

## (undated) DEVICE — DRAIN JACKSON PRATT CHANNEL 15FR ROUND HUBLESS SIL JP-2228

## (undated) DEVICE — BLADE KNIFE SURG 15 371115

## (undated) DEVICE — ESU PENCIL W/SMOKE EVAC NEPTUNE STRYKER 0703-046-000

## (undated) DEVICE — LINEN TOWEL PACK X5 5464

## (undated) DEVICE — LINEN ORTHO PACK 5446

## (undated) DEVICE — SU VICRYL 3-0 FS-1 27" J442H

## (undated) DEVICE — SOL WATER IRRIG 1000ML BOTTLE 2F7114

## (undated) DEVICE — ESU BLADE PEAK PLASMA 3.0S PS210-030S

## (undated) DEVICE — DERMABOND PRINEO 42CM

## (undated) DEVICE — FILTER HEPA FLUID TRAP NEPTUNE 0703-040-001

## (undated) DEVICE — GLOVE PROTEXIS MICRO 6.5  2D73PM65

## (undated) DEVICE — DRSG XEROFORM 5X9" 8884431605

## (undated) DEVICE — SU VICRYL 4-0 PS-1 18" UND J682G

## (undated) DEVICE — EYE MARKING PAD 581057

## (undated) DEVICE — SPECIMEN CONTAINER 5OZ STERILE 2600SA

## (undated) DEVICE — SU PLAIN FAST ABSORB 5-0 PC-1 18" 1915G

## (undated) DEVICE — GOWN XLG DISP 9545

## (undated) DEVICE — TUBING SUCTION MEDI-VAC 1/4"X20' N620A

## (undated) DEVICE — ESU GROUND PAD UNIVERSAL W/O CORD

## (undated) DEVICE — BNDG ELASTIC 6"X5YDS UNSTERILE 6611-60

## (undated) DEVICE — SUCTION MANIFOLD NEPTUNE 2 SYS 4 PORT 0702-020-000

## (undated) DEVICE — DRAIN JACKSON PRATT RESERVOIR 100ML SU130-1305

## (undated) DEVICE — DRSG KERLIX 4 1/2"X4YDS ROLL 6730

## (undated) DEVICE — LABEL MEDICATION SYSTEM 3303-P

## (undated) DEVICE — STRAP KNEE/BODY 31143004

## (undated) DEVICE — DRSG COTTON BALL 6PK LCB62

## (undated) DEVICE — BNDG ELASTIC 6" DBL LENGTH UNSTERILE 6611-16

## (undated) DEVICE — SU ETHILON 3-0 FS-1 18" 663G

## (undated) RX ORDER — LIDOCAINE HYDROCHLORIDE 20 MG/ML
INJECTION, SOLUTION EPIDURAL; INFILTRATION; INTRACAUDAL; PERINEURAL
Status: DISPENSED
Start: 2022-03-21

## (undated) RX ORDER — ONDANSETRON 2 MG/ML
INJECTION INTRAMUSCULAR; INTRAVENOUS
Status: DISPENSED
Start: 2022-03-21

## (undated) RX ORDER — EPHEDRINE SULFATE 50 MG/ML
INJECTION, SOLUTION INTRAMUSCULAR; INTRAVENOUS; SUBCUTANEOUS
Status: DISPENSED
Start: 2022-03-21

## (undated) RX ORDER — FENTANYL CITRATE 50 UG/ML
INJECTION, SOLUTION INTRAMUSCULAR; INTRAVENOUS
Status: DISPENSED
Start: 2022-03-21

## (undated) RX ORDER — HYDROMORPHONE HYDROCHLORIDE 1 MG/ML
INJECTION, SOLUTION INTRAMUSCULAR; INTRAVENOUS; SUBCUTANEOUS
Status: DISPENSED
Start: 2022-03-21

## (undated) RX ORDER — DEXAMETHASONE SODIUM PHOSPHATE 4 MG/ML
INJECTION, SOLUTION INTRA-ARTICULAR; INTRALESIONAL; INTRAMUSCULAR; INTRAVENOUS; SOFT TISSUE
Status: DISPENSED
Start: 2022-03-21

## (undated) RX ORDER — BUPIVACAINE HYDROCHLORIDE 2.5 MG/ML
INJECTION, SOLUTION EPIDURAL; INFILTRATION; INTRACAUDAL
Status: DISPENSED
Start: 2022-03-21

## (undated) RX ORDER — CLINDAMYCIN PHOSPHATE 900 MG/50ML
INJECTION, SOLUTION INTRAVENOUS
Status: DISPENSED
Start: 2022-03-21

## (undated) RX ORDER — FENTANYL CITRATE-0.9 % NACL/PF 10 MCG/ML
PLASTIC BAG, INJECTION (ML) INTRAVENOUS
Status: DISPENSED
Start: 2022-03-21

## (undated) RX ORDER — CEFAZOLIN SODIUM 1 G/3ML
INJECTION, POWDER, FOR SOLUTION INTRAMUSCULAR; INTRAVENOUS
Status: DISPENSED
Start: 2022-03-21

## (undated) RX ORDER — OXYCODONE HYDROCHLORIDE 5 MG/1
TABLET ORAL
Status: DISPENSED
Start: 2022-03-21

## (undated) RX ORDER — HYDROMORPHONE HCL IN WATER/PF 6 MG/30 ML
PATIENT CONTROLLED ANALGESIA SYRINGE INTRAVENOUS
Status: DISPENSED
Start: 2022-03-21